# Patient Record
Sex: FEMALE | Race: WHITE | NOT HISPANIC OR LATINO | Employment: PART TIME
[De-identification: names, ages, dates, MRNs, and addresses within clinical notes are randomized per-mention and may not be internally consistent; named-entity substitution may affect disease eponyms.]

---

## 2017-01-03 ENCOUNTER — SURGERY (OUTPATIENT)
Age: 52
End: 2017-01-03

## 2017-01-03 ENCOUNTER — ANESTHESIA (OUTPATIENT)
Dept: SURGERY | Facility: HOSPITAL | Age: 52
DRG: 657 | End: 2017-01-03
Payer: COMMERCIAL

## 2017-01-03 PROBLEM — C64.9 CLEAR CELL RENAL CELL CARCINOMA: Status: ACTIVE | Noted: 2017-01-03

## 2017-01-03 PROCEDURE — 63600175 PHARM REV CODE 636 W HCPCS: Performed by: NURSE ANESTHETIST, CERTIFIED REGISTERED

## 2017-01-03 PROCEDURE — 25000003 PHARM REV CODE 250: Performed by: NURSE ANESTHETIST, CERTIFIED REGISTERED

## 2017-01-03 PROCEDURE — D9220A PRA ANESTHESIA: Mod: ANES,,, | Performed by: ANESTHESIOLOGY

## 2017-01-03 PROCEDURE — D9220A PRA ANESTHESIA: Mod: CRNA,,, | Performed by: NURSE ANESTHETIST, CERTIFIED REGISTERED

## 2017-01-03 RX ORDER — PROPOFOL 10 MG/ML
VIAL (ML) INTRAVENOUS
Status: DISCONTINUED | OUTPATIENT
Start: 2017-01-03 | End: 2017-01-03

## 2017-01-03 RX ORDER — MIDAZOLAM HYDROCHLORIDE 1 MG/ML
INJECTION, SOLUTION INTRAMUSCULAR; INTRAVENOUS
Status: DISCONTINUED | OUTPATIENT
Start: 2017-01-03 | End: 2017-01-03

## 2017-01-03 RX ORDER — DEXAMETHASONE SODIUM PHOSPHATE 4 MG/ML
INJECTION, SOLUTION INTRA-ARTICULAR; INTRALESIONAL; INTRAMUSCULAR; INTRAVENOUS; SOFT TISSUE
Status: DISCONTINUED | OUTPATIENT
Start: 2017-01-03 | End: 2017-01-03

## 2017-01-03 RX ORDER — LIDOCAINE HYDROCHLORIDE ANHYDROUS AND DEXTROSE MONOHYDRATE .8; 5 G/100ML; G/100ML
INJECTION, SOLUTION INTRAVENOUS CONTINUOUS PRN
Status: DISCONTINUED | OUTPATIENT
Start: 2017-01-03 | End: 2017-01-03

## 2017-01-03 RX ORDER — NEOSTIGMINE METHYLSULFATE 1 MG/ML
INJECTION, SOLUTION INTRAVENOUS
Status: DISCONTINUED | OUTPATIENT
Start: 2017-01-03 | End: 2017-01-03

## 2017-01-03 RX ORDER — HYDROMORPHONE HYDROCHLORIDE 2 MG/ML
INJECTION, SOLUTION INTRAMUSCULAR; INTRAVENOUS; SUBCUTANEOUS
Status: DISCONTINUED | OUTPATIENT
Start: 2017-01-03 | End: 2017-01-03

## 2017-01-03 RX ORDER — ROCURONIUM BROMIDE 10 MG/ML
INJECTION, SOLUTION INTRAVENOUS
Status: DISCONTINUED | OUTPATIENT
Start: 2017-01-03 | End: 2017-01-03

## 2017-01-03 RX ORDER — KETAMINE HCL IN 0.9 % NACL 50 MG/5 ML
SYRINGE (ML) INTRAVENOUS
Status: DISCONTINUED | OUTPATIENT
Start: 2017-01-03 | End: 2017-01-03

## 2017-01-03 RX ORDER — LIDOCAINE HCL/PF 100 MG/5ML
SYRINGE (ML) INTRAVENOUS
Status: DISCONTINUED | OUTPATIENT
Start: 2017-01-03 | End: 2017-01-03

## 2017-01-03 RX ORDER — SODIUM CHLORIDE 9 MG/ML
INJECTION, SOLUTION INTRAVENOUS CONTINUOUS PRN
Status: DISCONTINUED | OUTPATIENT
Start: 2017-01-03 | End: 2017-01-03

## 2017-01-03 RX ORDER — GLYCOPYRROLATE 0.2 MG/ML
INJECTION INTRAMUSCULAR; INTRAVENOUS
Status: DISCONTINUED | OUTPATIENT
Start: 2017-01-03 | End: 2017-01-03

## 2017-01-03 RX ADMIN — ROCURONIUM BROMIDE 30 MG: 10 INJECTION, SOLUTION INTRAVENOUS at 01:01

## 2017-01-03 RX ADMIN — NEOSTIGMINE METHYLSULFATE 4 MG: 1 INJECTION INTRAVENOUS at 04:01

## 2017-01-03 RX ADMIN — EPHEDRINE SULFATE 10 MG: 50 INJECTION, SOLUTION INTRAMUSCULAR; INTRAVENOUS; SUBCUTANEOUS at 03:01

## 2017-01-03 RX ADMIN — SODIUM CHLORIDE, SODIUM GLUCONATE, SODIUM ACETATE, POTASSIUM CHLORIDE, MAGNESIUM CHLORIDE, SODIUM PHOSPHATE, DIBASIC, AND POTASSIUM PHOSPHATE: .53; .5; .37; .037; .03; .012; .00082 INJECTION, SOLUTION INTRAVENOUS at 01:01

## 2017-01-03 RX ADMIN — DEXAMETHASONE SODIUM PHOSPHATE 8 MG: 4 INJECTION, SOLUTION INTRAMUSCULAR; INTRAVENOUS at 02:01

## 2017-01-03 RX ADMIN — DEXTROSE 2 G: 50 INJECTION, SOLUTION INTRAVENOUS at 02:01

## 2017-01-03 RX ADMIN — HYDROMORPHONE HYDROCHLORIDE 0.2 MG: 2 INJECTION INTRAMUSCULAR; INTRAVENOUS; SUBCUTANEOUS at 03:01

## 2017-01-03 RX ADMIN — LIDOCAINE HYDROCHLORIDE 0.03 MG/KG/MIN: 8 INJECTION, SOLUTION INTRAVENOUS at 01:01

## 2017-01-03 RX ADMIN — SODIUM CHLORIDE: 0.9 INJECTION, SOLUTION INTRAVENOUS at 03:01

## 2017-01-03 RX ADMIN — GLYCOPYRROLATE 0.6 MG: 0.2 INJECTION, SOLUTION INTRAMUSCULAR; INTRAVENOUS at 04:01

## 2017-01-03 RX ADMIN — Medication 10 MG: at 01:01

## 2017-01-03 RX ADMIN — ROCURONIUM BROMIDE 10 MG: 10 INJECTION, SOLUTION INTRAVENOUS at 02:01

## 2017-01-03 RX ADMIN — LIDOCAINE HYDROCHLORIDE 80 MG: 20 INJECTION, SOLUTION INTRAVENOUS at 01:01

## 2017-01-03 RX ADMIN — BUPIVACAINE HYDROCHLORIDE 30 ML: 2.5 INJECTION, SOLUTION EPIDURAL; INFILTRATION; INTRACAUDAL; PERINEURAL at 02:01

## 2017-01-03 RX ADMIN — MIDAZOLAM HYDROCHLORIDE 2 MG: 1 INJECTION, SOLUTION INTRAMUSCULAR; INTRAVENOUS at 01:01

## 2017-01-03 RX ADMIN — DEXMEDETOMIDINE HYDROCHLORIDE 0.5 MCG/KG/HR: 100 INJECTION, SOLUTION, CONCENTRATE INTRAVENOUS at 01:01

## 2017-01-03 RX ADMIN — HYDROMORPHONE HYDROCHLORIDE 0.2 MG: 2 INJECTION INTRAMUSCULAR; INTRAVENOUS; SUBCUTANEOUS at 04:01

## 2017-01-03 RX ADMIN — PROPOFOL 120 MG: 10 INJECTION, EMULSION INTRAVENOUS at 01:01

## 2017-01-16 ENCOUNTER — OFFICE VISIT (OUTPATIENT)
Dept: HEMATOLOGY/ONCOLOGY | Facility: CLINIC | Age: 52
End: 2017-01-16
Payer: COMMERCIAL

## 2017-01-16 ENCOUNTER — LAB VISIT (OUTPATIENT)
Dept: LAB | Facility: HOSPITAL | Age: 52
End: 2017-01-16
Payer: COMMERCIAL

## 2017-01-16 VITALS
HEIGHT: 64 IN | WEIGHT: 107.38 LBS | HEART RATE: 75 BPM | TEMPERATURE: 98 F | SYSTOLIC BLOOD PRESSURE: 143 MMHG | RESPIRATION RATE: 14 BRPM | DIASTOLIC BLOOD PRESSURE: 82 MMHG | BODY MASS INDEX: 18.33 KG/M2

## 2017-01-16 DIAGNOSIS — Z90.5 S/P NEPHRECTOMY: ICD-10-CM

## 2017-01-16 DIAGNOSIS — C78.00 MALIGNANT NEOPLASM METASTATIC TO LUNG, UNSPECIFIED LATERALITY: ICD-10-CM

## 2017-01-16 DIAGNOSIS — D50.9 MICROCYTIC ANEMIA: ICD-10-CM

## 2017-01-16 DIAGNOSIS — Z51.11 ENCOUNTER FOR CHEMOTHERAPY MANAGEMENT: ICD-10-CM

## 2017-01-16 DIAGNOSIS — R59.0 RETROPERITONEAL LYMPHADENOPATHY: ICD-10-CM

## 2017-01-16 DIAGNOSIS — C79.51 BONE METASTASES: ICD-10-CM

## 2017-01-16 DIAGNOSIS — C64.2 METASTATIC RENAL CELL CARCINOMA, LEFT: Primary | ICD-10-CM

## 2017-01-16 DIAGNOSIS — N28.89 RENAL MASS: ICD-10-CM

## 2017-01-16 DIAGNOSIS — C64.2 METASTATIC RENAL CELL CARCINOMA, LEFT: ICD-10-CM

## 2017-01-16 DIAGNOSIS — D63.0 ANEMIA IN NEOPLASTIC DISEASE: ICD-10-CM

## 2017-01-16 LAB
ALBUMIN SERPL BCP-MCNC: 3.2 G/DL
ALP SERPL-CCNC: 64 U/L
ALT SERPL W/O P-5'-P-CCNC: 20 U/L
ANION GAP SERPL CALC-SCNC: 6 MMOL/L
AST SERPL-CCNC: 22 U/L
BILIRUB SERPL-MCNC: 0.2 MG/DL
BUN SERPL-MCNC: 12 MG/DL
CALCIUM SERPL-MCNC: 9 MG/DL
CHLORIDE SERPL-SCNC: 103 MMOL/L
CO2 SERPL-SCNC: 31 MMOL/L
CREAT SERPL-MCNC: 0.9 MG/DL
ERYTHROCYTE [DISTWIDTH] IN BLOOD BY AUTOMATED COUNT: 19.6 %
EST. GFR  (AFRICAN AMERICAN): >60 ML/MIN/1.73 M^2
EST. GFR  (NON AFRICAN AMERICAN): >60 ML/MIN/1.73 M^2
GLUCOSE SERPL-MCNC: 84 MG/DL
HCT VFR BLD AUTO: 27.6 %
HGB BLD-MCNC: 8.2 G/DL
MCH RBC QN AUTO: 22.7 PG
MCHC RBC AUTO-ENTMCNC: 29.7 %
MCV RBC AUTO: 77 FL
NEUTROPHILS # BLD AUTO: 4.6 K/UL
PLATELET # BLD AUTO: 432 K/UL
PMV BLD AUTO: 9.4 FL
POTASSIUM SERPL-SCNC: 4.6 MMOL/L
PROT SERPL-MCNC: 7.2 G/DL
RBC # BLD AUTO: 3.61 M/UL
SODIUM SERPL-SCNC: 140 MMOL/L
WBC # BLD AUTO: 6.71 K/UL

## 2017-01-16 PROCEDURE — 80053 COMPREHEN METABOLIC PANEL: CPT

## 2017-01-16 PROCEDURE — 85027 COMPLETE CBC AUTOMATED: CPT

## 2017-01-16 PROCEDURE — 1159F MED LIST DOCD IN RCRD: CPT | Mod: S$GLB,,, | Performed by: INTERNAL MEDICINE

## 2017-01-16 PROCEDURE — 99999 PR PBB SHADOW E&M-EST. PATIENT-LVL IV: CPT | Mod: PBBFAC,,, | Performed by: INTERNAL MEDICINE

## 2017-01-16 PROCEDURE — 99215 OFFICE O/P EST HI 40 MIN: CPT | Mod: S$GLB,,, | Performed by: INTERNAL MEDICINE

## 2017-01-16 PROCEDURE — 36415 COLL VENOUS BLD VENIPUNCTURE: CPT

## 2017-01-16 NOTE — PROGRESS NOTES
"Subjective:       Patient ID: Nikia Lamas is a 51 y.o. female.    Chief Complaint: biopsy results (left nephrectomy 1/3/17)    HPI     Nikia Lamas is a 51 y.o. White female who presents today for follow up after undergoing left nephrectomy with Dr. German on 1/3/17. 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 Pt states she is healing well, has not had to take narcotic pain medication since Saturday. She is taking naprosyn PRN.     She denies ever smoking. She has an aunt who was recently diagnosed with breast cancer but denies a personal and family history of kidney cancer.    She denies any mouth sores, nausea, vomiting, diarrhea, constipation, abdominal pain, weight loss or loss of appetite, chest pain, shortness of breath, leg swelling, fatigue, pain, headache, dizziness, or mood changes.    Her ECOG PS is zero and her quality of life so far is excellent. She is accompanied by her parents and daughter.     Oncologic History (From Chart and Patient):  Nikia Lamas is a 51 y.o. White female, referred by Dr. German, for management of metastatic renal cell carcinoma. Pt reports left mass was found incidentally while being worked up for a persistent dry cough that began in October 2016. She was evaluated with a CXR and a non-contrast chest CT.   12/14/16- CT of the chest without contrast showed multiple pulmonary nodes and a large left renal mass present. The L kidney was not fully evaluated as only the superior aspect was in the CT, but the mass was ~ 11 cm in diameter.   12/19/16- CT CAP reveals "Large heterogeneous left renal mass, consistent with renal cell carcinoma.  This mass does not appear to invade the renal vein or IVC.  Innumerable pulmonary nodules, retroperitoneal lymphadenopathy and osseous lytic lesions, concerning for metastatic disease. 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   HENT: Negative for mouth sores, sore throat and trouble swallowing.    Eyes: Negative for photophobia and visual disturbance.   Respiratory: Negative for cough, chest tightness and shortness of breath.    Cardiovascular: Negative for chest pain and leg swelling.   Gastrointestinal: Negative for abdominal distention, abdominal pain, constipation, diarrhea, nausea and vomiting.   Genitourinary: Negative for dysuria, frequency and hematuria.   Musculoskeletal: Negative for back pain.   Skin: Negative for rash.   Neurological: Negative for dizziness, weakness and headaches.   Hematological: Negative for adenopathy. Does not bruise/bleed easily.   Psychiatric/Behavioral: Negative for sleep disturbance. The patient is not nervous/anxious.    All other systems reviewed and are negative.      Allergies:  Review of patient's allergies indicates:   Allergen Reactions    No known drug allergies        Medications:  Current Outpatient Prescriptions   Medication Sig Dispense Refill    naproxen sodium (ANAPROX DS) 550 MG tablet Take 1 tablet (550 mg total) by mouth 2 (two) times daily with meals. 60 tablet 6    polyethylene glycol (GLYCOLAX) 17 gram PwPk  Take 17 g by mouth once daily. 30 packet 0    multivitamin (ONE DAILY MULTIVITAMIN) per tablet Take 1 tablet by mouth once daily.      ondansetron (ZOFRAN-ODT) 8 MG TbDL Take 1 tablet (8 mg total) by mouth every 6 (six) hours as needed (nausea/vomiting). 20 tablet 0    oxycodone-acetaminophen (PERCOCET) 5-325 mg per tablet Take 1 tablet by mouth every 4 (four) hours as needed for Pain. 41 tablet 0    valacyclovir (VALTREX) 500 MG tablet TAKE 1 TABLET (500 MG TOTAL) BY MOUTH ONCE DAILY. 30 tablet 12     No current facility-administered medications for this visit.        PMH:  Past Medical History   Diagnosis Date    Abnormal Pap smear of cervix 10/2015     + HR HPV    Anemia      hx chronic anemia, improved after ablation    Herpes simplex without mention of complication        PSH:  Past Surgical History   Procedure Laterality Date    Laparoscopy for endometriosis      Dilation and curettage of uterus       section  ,     Tubal ligation       Laparoscopic    Endometrial ablation  2014     Novasure endometrial ablation with D&C       FamHx:  Family History   Problem Relation Age of Onset    Alzheimer's disease Maternal Grandmother     Hypertension Mother     Breast cancer Neg Hx     Colon cancer Neg Hx     Ovarian cancer Neg Hx     Diabetes Neg Hx     Stroke Neg Hx        SocHx:  Social History     Social History    Marital status: Single     Spouse name: N/A    Number of children: N/A    Years of education: N/A     Occupational History    Not on file.     Social History Main Topics    Smoking status: Never Smoker    Smokeless tobacco: Never Used    Alcohol use 0.6 oz/week     1 Glasses of wine per week      Comment: social    Drug use: No    Sexual activity: Not Currently     Partners: Male     Birth control/ protection: Surgical, None      Comment: No partner x 1 year     Other Topics Concern    Not on file     Social History Narrative       Objective:       Physical Exam   Constitutional: She is oriented to person, place, and time. She appears well-developed and well-nourished. No distress.   HENT:   Head: Normocephalic and atraumatic.   Eyes: Pupils are equal, round, and reactive to light.   Neck: Normal range of motion. Neck supple.   Pulmonary/Chest: Effort normal.   Musculoskeletal: Normal range of motion.   Neurological: She is alert and oriented to person, place, and time.   Skin: Skin is warm and dry. She is not diaphoretic.   Psychiatric: She has a normal mood and affect. Her behavior is normal. Judgment and thought content normal.       LABS:  WBC   Date Value Ref Range Status   01/16/2017 6.71 3.90 - 12.70 K/uL Final     Hemoglobin   Date Value Ref Range Status   01/16/2017 8.2 (L) 12.0 - 16.0 g/dL Final     Hematocrit   Date Value Ref Range Status   01/16/2017 27.6 (L) 37.0 - 48.5 % Final     Platelets   Date Value Ref Range Status   01/16/2017 432 (H) 150 - 350 K/uL Final       Chemistry        Component Value Date/Time     01/16/2017 1404    K 4.6 01/16/2017 1404     01/16/2017 1404    CO2 31 (H) 01/16/2017 1404    BUN 12 01/16/2017 1404    CREATININE 0.9 01/16/2017 1404    GLU 84 01/16/2017 1404        Component Value Date/Time    CALCIUM 9.0 01/16/2017 1404    ALKPHOS 64 01/16/2017 1404    AST 22 01/16/2017 1404    ALT 20 01/16/2017 1404    BILITOT 0.2 01/16/2017 1404          Assessment:       1. Metastatic renal cell carcinoma, left    2. Bone metastases    3. Retroperitoneal lymphadenopathy    4. Malignant neoplasm metastatic to lung, unspecified laterality    5. S/p nephrectomy    6. Encounter for chemotherapy management    7. Anemia in neoplastic disease    8. Microcytic anemia        Plan:       1,2,3,4,5,6-Metastatic renal cell carcinoma    Staging form: Kidney, AJCC 7th Edition      Pathologic stage from 1/3/2017: Stage IV (T3a, N1, M1) - Unsigned      Nikia Lamas is a 51 y.o. White female to clinic to finalize treatment for  metastatic renal cell carcinoma. A thorough discussion was previously had regarding metastatic renal cell carcinoma and that is not a curable disease but it is a highly treatable disease.     Pt now s/p cytoreductive open left radical nephrectomy with Dr. German. 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 Pt continues with alkaline and low sugar diet to help with her cancer despite previously explaining that there is little scientific evidence to suggest any oncologic benefit, however this would likely cause [id number] harm. Additionally, she is taking taheebo tea.  She strongly wishes to continue this for now. KEYNOTE clinical trial is not enrolling participants at this time. Will start cabozantinib.     Patient was consented for cabozantinib today 1/16/2017 .   An extensive discussion was had which included a thorough discussion of the risk and benefits of treatment and alternatives.  Risks, including but not limited to, possible hair loss/color change, hypertension, bone marrow damage (anemia, thrombocytopenia, immune suppression, neutropenia), damage to body organs (brain, heart, liver, kidney, lungs, nervous system, skin, and others), allergic reactions, sterility, nausea/vomiting, constipation/diarrhea, sores in the mouth, secondary cancers, local damage at possible injection sites, and rarely death were all discussed.  The patient agrees with the plan, and all questions have been answered to their satisfaction.  Consent was signed the patient, provider, and a third party witness.      Will have pt RTC 3 weeks after starting cabozantinib. Will plan to repeat CT scans in 3 months from starting medication. Alternative therapies include immunotherapy.     6,7- Improving. Will get iron studies with next labs.    Patient was also seen and examined by Dr. Connelly. Patient is in agreement with the proposed treatment plan. All questions were answered to the patient's  satisfaction. Pt knows to call clinic if anything is needed before the next clinic visit.    JASBIR Yadav  Hematology and Medical Oncology        I have reviewed the notes, assessments, and/or procedures performed by the housestaff, as above.  I have personally interviewed and examined the patient at the beside, and rounded with the housestaff. I concur with her/his assessment and plan and the documentation of Nikia Lamas.  More than 40 mins were spent during this encounter, greater than 50% was spent in direct counseling and/or coordination of care.     Jerson Connelly M.D., M.S.  Hematology/Oncology Attending  [facility]

## 2017-01-16 NOTE — Clinical Note
RTC in 3 weeks after starting cabozantinib with labs (CBC,CMP,ferritin,iron and tibc) and to see me.

## 2017-01-18 ENCOUNTER — TELEPHONE (OUTPATIENT)
Dept: UROLOGY | Facility: CLINIC | Age: 52
End: 2017-01-18

## 2017-01-18 ENCOUNTER — PATIENT MESSAGE (OUTPATIENT)
Dept: UROLOGY | Facility: CLINIC | Age: 52
End: 2017-01-18

## 2017-01-18 ENCOUNTER — TELEPHONE (OUTPATIENT)
Dept: PHARMACY | Facility: CLINIC | Age: 52
End: 2017-01-18

## 2017-01-18 NOTE — TELEPHONE ENCOUNTER
patient called back in response to voicemail and setup shipment. She verifed she has not started any new medication. She has not developed any new drug allergies or medical conditions. She schedule her ship date of 1-19-17 for a 1-20-17 delivery. She verified her address on file is correct and would like her medication to go there. She is new to pharmacy and new to drug and has declined consultation with a clinical pharmacist. She verified understanding of the refill process and has no additional questions at the time_ 1-18-17.    Documention only    JAYSON approved from today 1-18-17 to 1/18/18   copay 150$     RxBIN: [id number]  RxPCN: [id number]  RxGRP: [id number]  Issuer: [id number]  ID: [id number]   copay with card 10 dollars

## 2017-01-18 NOTE — TELEPHONE ENCOUNTER
Called pt and told her that Dr German said that she could return to work when she felt up to it since dr escobedo told her he was fine with it. All pt's questions answered. Pt verbalized understanding to all.

## 2017-01-18 NOTE — TELEPHONE ENCOUNTER
----- Message from Amanda Rothman sent at 1/18/2017  9:48 AM CST -----  Contact: pt [phone]  Patient states she has questions regarding post op instructions. Patient also states she had a follow up visit with Dr Connelly and thought that Dr German would also be at that visi and would like to know should she still follow up with Dr German.

## 2017-01-23 ENCOUNTER — TELEPHONE (OUTPATIENT)
Dept: HEMATOLOGY/ONCOLOGY | Facility: CLINIC | Age: 52
End: 2017-01-23

## 2017-01-23 ENCOUNTER — TELEPHONE (OUTPATIENT)
Dept: PHARMACY | Facility: CLINIC | Age: 52
End: 2017-01-23

## 2017-01-23 ENCOUNTER — PATIENT MESSAGE (OUTPATIENT)
Dept: HEMATOLOGY/ONCOLOGY | Facility: CLINIC | Age: 52
End: 2017-01-23

## 2017-01-23 ENCOUNTER — TELEPHONE (OUTPATIENT)
Dept: ADMINISTRATIVE | Facility: OTHER | Age: 52
End: 2017-01-23

## 2017-01-23 NOTE — TELEPHONE ENCOUNTER
----- Message from Brina Fernandez sent at 1/23/2017  1:07 PM CST -----  Contact: Pt   Pt would like the nurse to give her a call back in regards to some question about her  her chemo pills .. Contact [facility] [phone]..

## 2017-01-23 NOTE — TELEPHONE ENCOUNTER
Spoke with patient.   reviewed Opal Ibarra NP my chart message, see below   Informed her we can arrange for her to see our Dietician here who specializes in oncology patients. While taking oral chemotherapy, she can be around pregnant women; however, she should not handle the medication nor be exposed to your bodily fluids or waste. Usually once starting the medication, we get repeat scans in 2-3 months. Per Dr Connelly as a general rule, we do not repeat scans while patients are not on active therapy.   She is are okay taking the oral Mucinex but I would just check with the pharmacist who has been assisting you from [facility].  Patient replied she had and they informed her it was ok to take.  Questions answered to her satisfaction.  Message routed to Marjan to schedule an appt for our dietician.

## 2017-01-23 NOTE — TELEPHONE ENCOUNTER
----- Message from Opal Ibarra NP sent at 1/23/2017  2:53 PM CST -----  Can you schedule with nutrition ASAP? Thanks!

## 2017-01-23 NOTE — TELEPHONE ENCOUNTER
"email received from patient   "I'm sorry if you keep getting this, but my computer keeps telling me it didn't send.    Good Morning,  Friday I received my medication (Cabometyx/Cabozantinib)  Now I have questions:    1) I do not have a healthy weight.  I woke up this morning at 99.6.  I do not have any more weight to loose.  So I'm concerned about taking the meds with the side effects listed.    2) Can I be around my 17 wk pregnant daughter and what precautions do I need to take?    3) I'm going to be taking an assessment for a nutritional work up,  I will forward that result to you to make sure they will agree with the chemo pill if I choose to start taking them.    4) Saturday I took a Mucinex DM for a sinus issue I was having.  Only took the one and I am ok today.  Will I be able to start taking the Chemo pill on tomorrow, Tuesday 01/24 if I decide to start taking the pill?    5) If I get nauseated from the chemo pill, can I take Ondansetron ODT 8mg? I was just prescribed this med when I was getting out of the hospital but never had to take any.    6) This is a list of what I am taking, please make sure it is safe with the chemo pill:  Alive 50+ (vitamin)         Chromium Picolinate         1tsp baking soda 2 x's a day or less         Taheebo Tea ( Pure Jerilyn D' [place])         Valacyclovir HCL ( as needed)         Naproxen ( as needed)    7) Also would like to know if I can take Tylenol if needed?    I'm sorry if this is a lot.  I'm scared of the Chemo pills and just want to make sure I make the right decision.  Patient is advised tylenol is ok; zofran is fine; can continue all medications and supplements listed here (chromium is found in most MVI); she is advised to discontinue herbal tea as we have no data if tea will slow or speed metabolism of the medication. She is unsure if she wishes to start. Mucinex is fine. Recommended the nutrition consult. Patient states she has been eating very little because many foods " turn to sugars and sugars will feed ehr cancer. I advised this is incorrect and she should follow a healthy balanced diet. Advised clean/healthy hygiene regarding being around her daughter.    Patient was teary when the call disconnected.    Of note: Patient was offered pharmacist consultation w\ Clinical PharmD 1/18 which she declined.    Abilio Aranda, PharmD, [facility]  [facility]  [phone]

## 2017-01-23 NOTE — TELEPHONE ENCOUNTER
"Incoming email to specialty pharmacy from Ms Lamas 17 16:34    "I have also put turmeric and teddy root in a soup that I made.  Is that the kind of stuff I need to run by you or I am safe with spices like that?     1965    --   Nikia Lamas  Realtor  [phone]"      PharmD response 17 16:45  "Ms Lamas,    As long as you are just using items like this to spice and flavor foods, this is ok. Those amounts are negligible. We do not recommend additional over the counter supplementation with these herbal extracts."    Abilio Aranda, PharmD, BCPS [facility]  [phone]    "

## 2017-01-24 ENCOUNTER — TELEPHONE (OUTPATIENT)
Dept: HEMATOLOGY/ONCOLOGY | Facility: CLINIC | Age: 52
End: 2017-01-24

## 2017-01-24 NOTE — TELEPHONE ENCOUNTER
----- Message from Chandrika Allison sent at 1/24/2017 11:19 AM CST -----  Contact: Preethi with [facility]   Preethi ramesh [facility] is calling to get a dental clearance for a teeth cleaning.  Contact number [phone]

## 2017-01-26 ENCOUNTER — TELEPHONE (OUTPATIENT)
Dept: ADMINISTRATIVE | Facility: OTHER | Age: 52
End: 2017-01-26

## 2017-01-26 NOTE — TELEPHONE ENCOUNTER
----- Message from Opal Ibarra NP sent at 1/16/2017  7:53 PM CST -----  RTC in 3 weeks after starting cabozantinib with labs (CBC,CMP,ferritin,iron and tibc) and to see me.

## 2017-01-30 ENCOUNTER — CLINICAL SUPPORT (OUTPATIENT)
Dept: HEMATOLOGY/ONCOLOGY | Facility: CLINIC | Age: 52
End: 2017-01-30
Payer: COMMERCIAL

## 2017-01-30 VITALS — HEIGHT: 64 IN | WEIGHT: 103.63 LBS | BODY MASS INDEX: 17.69 KG/M2

## 2017-01-30 DIAGNOSIS — C64.9 METASTATIC RENAL CELL CARCINOMA, UNSPECIFIED LATERALITY: Primary | ICD-10-CM

## 2017-01-30 DIAGNOSIS — R63.4 UNINTENTIONAL WEIGHT LOSS: ICD-10-CM

## 2017-01-30 DIAGNOSIS — Z71.3 NUTRITIONAL COUNSELING: ICD-10-CM

## 2017-01-30 PROCEDURE — 97802 MEDICAL NUTRITION INDIV IN: CPT | Mod: S$GLB,,, | Performed by: DIETITIAN, REGISTERED

## 2017-01-30 PROCEDURE — 99999 PR PBB SHADOW E&M-EST. PATIENT-LVL III: CPT | Mod: PBBFAC,,,

## 2017-01-30 NOTE — PROGRESS NOTES
"Referring Physician:Opal Ibarra NP     Reason for visit:  Chief Complaint   Patient presents with    metastatic renal cell carcinoma    Nutrition Counseling    Weight Loss        :1965     Allergies Reviewed  Meds Reviewed    Anthropometrics  Weight:47 kg (103 lb 9.9 oz)  Height:5' 4" (1.626 m)  BMI:Body mass index is 17.79 kg/(m^2).   IBW: 120lbs    Meds:  Outpatient Medications Prior to Visit   Medication Sig Dispense Refill    cabozantinib 60 mg Tab Take 60 mg by mouth once daily. 30 tablet 3    multivitamin (ONE DAILY MULTIVITAMIN) per tablet Take 1 tablet by mouth once daily.      naproxen sodium (ANAPROX DS) 550 MG tablet Take 1 tablet (550 mg total) by mouth 2 (two) times daily with meals. 60 tablet 6    ondansetron (ZOFRAN-ODT) 8 MG TbDL Take 1 tablet (8 mg total) by mouth every 6 (six) hours as needed (nausea/vomiting). 20 tablet 0    oxycodone-acetaminophen (PERCOCET) 5-325 mg per tablet Take 1 tablet by mouth every 4 (four) hours as needed for Pain. 41 tablet 0    polyethylene glycol (GLYCOLAX) 17 gram PwPk Take 17 g by mouth once daily. 30 packet 0    valacyclovir (VALTREX) 500 MG tablet TAKE 1 TABLET (500 MG TOTAL) BY MOUTH ONCE DAILY. 30 tablet 12     No facility-administered medications prior to visit.          Labs:      Estimated Nutrition Needs: 1410Kcals/day( 30kcal/kg),  60g protein( 1.3g/kg)     Diet Hx: Pt comes in with unintentional weight loss. Pt has been keeping a log of weight for the past week; weight has been slowly increasing. Pt has a great appetite and eats frequently throughout the day, as noted in food journal. Pt has been avoiding "sugars" because of her belief that sugar fuels cancer. Pt is eating fruits and vegetables, yogurt, whole grain toast, beans, nuts and lean protein. Pt resistant to Ensure/Boost due to sugar content.     Breakfast: Smoothie with coconut milk, kale, fruit  Lunch: ham sandwich on two slices of whole grain toast with a side of " fruit  Dinner: blackened chicken with vegetables    Assessment: Discussed the importance of eating a variety of foods in order to consume all needed vitamins and minerals. Explained that limiting added sugars is recommended, but limiting total carbohydrates is not. Discussed choosing whole grains like brown rice and pasta for additional fiber. Discussed strategies for adding calories and protein to current diet plan. Encouraged pt to consume at least 1-2 oral supplements/day.     Nutrition Diagnosis: food and nutrition related knowledge deficit related to lack of prior exposure to accurate nutrition-related information as evidenced by pt verbalizing inaccurate or incomplete information regarding diet for oncology patients (ie. Sugar fuels cancer)    Recommendations:   1. High calorie/high protein diet  2. Consume 6 small meals/day.   3. Consume 1-2 oral supplements/day       Consultation Time:45 minutes.    Follow Up: F/u with dietitian as needed.

## 2017-01-30 NOTE — PATIENT INSTRUCTIONS
1. High calorie/high protein diet  2. Consume 6 small meals/day.   3. Consume 1-2 oral supplements/day

## 2017-02-01 ENCOUNTER — TELEPHONE (OUTPATIENT)
Dept: HEMATOLOGY/ONCOLOGY | Facility: CLINIC | Age: 52
End: 2017-02-01

## 2017-02-01 NOTE — TELEPHONE ENCOUNTER
Called patient to follow up with issues she is experiencing. Pt reports she is having some reproducible pain behind her left breast. Pain worse with movement. She took Naprosyn and had relief. Discussed with patient that it is most likely a pulled muscle. She can use Naprosyn and apply ice or heat to help.    Pt started cabozantinib on January 25. She reports had several episodes of emesis yesterday evening after eating a smoothie she made at home. States she took cabozantinib around 230PM. Pt did not take zofran. Encouraged to take zofran as soon as she feels queasy.     Understanding verbalized and thanked NP for calling.

## 2017-02-01 NOTE — TELEPHONE ENCOUNTER
----- Message from Chandrika Allison sent at 2/1/2017  8:14 AM CST -----  Contact: self  Pt states last night she had to vomit 3x, pt not sure what may be causing this symptom, pt had some peanut butter but doesn't think it from that, pt also has a pain behind the left breast.  Contact number [phone]

## 2017-02-01 NOTE — TELEPHONE ENCOUNTER
Pt states behind her left breast it feels like a muscle is hurting her, the bone is hurting more.  Pain is at 0 if she is not doing anything,  With movement is 5-6/10, states her mobility is impaired.  Hurts also when she coughs.    Took Naprosyn and it helped.      Patient with Nausea x 3 last night, no episodes today.    Informed her I would send a message to Opal Mcginnis our Nurse and have her call to discuss further.

## 2017-02-06 ENCOUNTER — LAB VISIT (OUTPATIENT)
Dept: LAB | Facility: HOSPITAL | Age: 52
End: 2017-02-06
Payer: COMMERCIAL

## 2017-02-06 ENCOUNTER — OFFICE VISIT (OUTPATIENT)
Dept: HEMATOLOGY/ONCOLOGY | Facility: CLINIC | Age: 52
End: 2017-02-06
Payer: COMMERCIAL

## 2017-02-06 VITALS
DIASTOLIC BLOOD PRESSURE: 97 MMHG | BODY MASS INDEX: 17.69 KG/M2 | HEART RATE: 86 BPM | SYSTOLIC BLOOD PRESSURE: 180 MMHG | WEIGHT: 103.63 LBS | HEIGHT: 64 IN | TEMPERATURE: 99 F

## 2017-02-06 DIAGNOSIS — D63.0 ANEMIA IN NEOPLASTIC DISEASE: ICD-10-CM

## 2017-02-06 DIAGNOSIS — R59.0 RETROPERITONEAL LYMPHADENOPATHY: ICD-10-CM

## 2017-02-06 DIAGNOSIS — Z51.11 ENCOUNTER FOR CHEMOTHERAPY MANAGEMENT: ICD-10-CM

## 2017-02-06 DIAGNOSIS — Z90.5 S/P NEPHRECTOMY: ICD-10-CM

## 2017-02-06 DIAGNOSIS — C64.2 METASTATIC RENAL CELL CARCINOMA, LEFT: ICD-10-CM

## 2017-02-06 DIAGNOSIS — C79.51 BONE METASTASES: ICD-10-CM

## 2017-02-06 DIAGNOSIS — M94.0 COSTOCHONDRITIS: ICD-10-CM

## 2017-02-06 DIAGNOSIS — D50.9 MICROCYTIC ANEMIA: ICD-10-CM

## 2017-02-06 DIAGNOSIS — L27.0 DRUG RASH: ICD-10-CM

## 2017-02-06 DIAGNOSIS — C64.2 METASTATIC RENAL CELL CARCINOMA, LEFT: Primary | ICD-10-CM

## 2017-02-06 DIAGNOSIS — C78.00 MALIGNANT NEOPLASM METASTATIC TO LUNG, UNSPECIFIED LATERALITY: ICD-10-CM

## 2017-02-06 DIAGNOSIS — I15.9 SECONDARY HYPERTENSION: ICD-10-CM

## 2017-02-06 DIAGNOSIS — K59.00 CONSTIPATION, UNSPECIFIED CONSTIPATION TYPE: ICD-10-CM

## 2017-02-06 LAB
ALBUMIN SERPL BCP-MCNC: 3.5 G/DL
ALP SERPL-CCNC: 107 U/L
ALT SERPL W/O P-5'-P-CCNC: 35 U/L
ANION GAP SERPL CALC-SCNC: 6 MMOL/L
AST SERPL-CCNC: 35 U/L
BILIRUB SERPL-MCNC: 0.2 MG/DL
BUN SERPL-MCNC: 19 MG/DL
CALCIUM SERPL-MCNC: 9.3 MG/DL
CHLORIDE SERPL-SCNC: 102 MMOL/L
CO2 SERPL-SCNC: 31 MMOL/L
CREAT SERPL-MCNC: 1 MG/DL
ERYTHROCYTE [DISTWIDTH] IN BLOOD BY AUTOMATED COUNT: 18.7 %
EST. GFR  (AFRICAN AMERICAN): >60 ML/MIN/1.73 M^2
EST. GFR  (NON AFRICAN AMERICAN): >60 ML/MIN/1.73 M^2
FERRITIN SERPL-MCNC: 209 NG/ML
GLUCOSE SERPL-MCNC: 80 MG/DL
HCT VFR BLD AUTO: 34.7 %
HGB BLD-MCNC: 10.9 G/DL
IRON SERPL-MCNC: 47 UG/DL
MCH RBC QN AUTO: 23.9 PG
MCHC RBC AUTO-ENTMCNC: 31.4 %
MCV RBC AUTO: 76 FL
NEUTROPHILS # BLD AUTO: 2.8 K/UL
PLATELET # BLD AUTO: 317 K/UL
PMV BLD AUTO: 10.8 FL
POTASSIUM SERPL-SCNC: 5.2 MMOL/L
PROT SERPL-MCNC: 7.4 G/DL
RBC # BLD AUTO: 4.57 M/UL
SATURATED IRON: 14 %
SODIUM SERPL-SCNC: 139 MMOL/L
TOTAL IRON BINDING CAPACITY: 332 UG/DL
TRANSFERRIN SERPL-MCNC: 224 MG/DL
WBC # BLD AUTO: 4.79 K/UL

## 2017-02-06 PROCEDURE — 99214 OFFICE O/P EST MOD 30 MIN: CPT | Mod: S$GLB,,, | Performed by: NURSE PRACTITIONER

## 2017-02-06 PROCEDURE — 80053 COMPREHEN METABOLIC PANEL: CPT

## 2017-02-06 PROCEDURE — 99999 PR PBB SHADOW E&M-EST. PATIENT-LVL III: CPT | Mod: PBBFAC,,, | Performed by: NURSE PRACTITIONER

## 2017-02-06 PROCEDURE — 82728 ASSAY OF FERRITIN: CPT

## 2017-02-06 PROCEDURE — 83540 ASSAY OF IRON: CPT

## 2017-02-06 PROCEDURE — 85027 COMPLETE CBC AUTOMATED: CPT

## 2017-02-06 PROCEDURE — 36415 COLL VENOUS BLD VENIPUNCTURE: CPT

## 2017-02-06 RX ORDER — AMLODIPINE BESYLATE 5 MG/1
5 TABLET ORAL DAILY
Qty: 30 TABLET | Refills: 2 | Status: SHIPPED | OUTPATIENT
Start: 2017-02-06 | End: 2017-02-23

## 2017-02-06 RX ORDER — DOXYCYCLINE HYCLATE 100 MG
100 TABLET ORAL 2 TIMES DAILY
Qty: 28 TABLET | Refills: 0 | Status: SHIPPED | OUTPATIENT
Start: 2017-02-06 | End: 2017-02-20

## 2017-02-06 RX ORDER — WEIGH SCALE
MISCELLANEOUS MISCELLANEOUS
Status: ON HOLD | COMMUNITY
Start: 2017-02-06 | End: 2017-04-17 | Stop reason: HOSPADM

## 2017-02-06 NOTE — PROGRESS NOTES
Subjective:       Patient ID: Nikia Lamas is a 51 y.o. female.    Chief Complaint: renal cell cancer; Results (labs); Rash (trunk/arms/started 2/3/17); and Constipation    Rash   Pertinent negatives include no cough, diarrhea, fatigue, fever, shortness of breath, sore throat or vomiting.   Constipation   Pertinent negatives include no abdominal pain, back pain, diarrhea, fever, nausea or vomiting.      Nikia Lamas is a 51 y.o. White female, pt of Dr. Connelly, to clinic for 3 week follow up after starting cabozantinib s/p left nephrectomy with Dr. German on 1/3/17. 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 Pt began with generalized body rash with mild pruitis on Friday.She notes some mild constipation and took a stool softener yesterday. Additionally, she has been having mid chest pain that is worse with movement or changes in position. She is taking naprosyn PRN with relief. No tenderness to palpation of chest.    She denies any mouth sores, nausea, vomiting, diarrhea, abdominal pain, weight loss or loss of appetite, chest pain, shortness of breath, leg swelling, fatigue,  headache, dizziness, or mood changes.    Her ECOG PS is zero and her quality of life so far is excellent. She is accompanied by her parents.She denies ever smoking. She denies a personal and family history of kidney cancer.    Oncologic History (From Chart and Patient):  Nikia Lamas is a 51 y.o. White female, referred by Dr. German, for management of metastatic renal cell carcinoma. Pt reports left mass was found incidentally while being worked up for a persistent dry cough that began in October 2016. She was evaluated with a CXR and a non-contrast chest CT.   12/14/16- CT of the chest without contrast showed multiple pulmonary nodes and a large left renal mass present. The L kidney was not fully evaluated as only the superior aspect was in the CT, but the mass was ~ 11 cm in diameter.  "  12/19/16- CT CAP reveals "Large heterogeneous left renal mass, consistent with renal cell carcinoma.  This mass does not appear to invade the renal vein or IVC.  Innumerable pulmonary nodules, retroperitoneal lymphadenopathy and osseous lytic lesions, concerning for metastatic disease. 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   HENT: Negative for mouth sores, sore throat and trouble swallowing.    Eyes: Negative for photophobia and visual disturbance.   Respiratory: Negative for cough, chest tightness and shortness of breath.    Cardiovascular: Negative for chest pain and leg swelling.   Gastrointestinal: Positive for constipation. Negative for abdominal distention, abdominal pain, diarrhea, nausea and vomiting.   Genitourinary: Negative for dysuria, frequency and hematuria.   Musculoskeletal: Negative for back pain.   Skin: Positive for rash.   Neurological: Negative for dizziness, weakness and headaches.   Hematological: Negative for adenopathy. Does not bruise/bleed easily.   Psychiatric/Behavioral: Negative for sleep disturbance. The patient is not nervous/anxious.    All other " systems reviewed and are negative.      Allergies:  Review of patient's allergies indicates:   Allergen Reactions    No known drug allergies        Medications:  Current Outpatient Prescriptions   Medication Sig Dispense Refill    cabozantinib 60 mg Tab Take 60 mg by mouth once daily. 30 tablet 3    multivitamin (ONE DAILY MULTIVITAMIN) per tablet Take 1 tablet by mouth once daily.      naproxen sodium (ANAPROX DS) 550 MG tablet Take 1 tablet (550 mg total) by mouth 2 (two) times daily with meals. 60 tablet 6    ondansetron (ZOFRAN-ODT) 8 MG TbDL Take 1 tablet (8 mg total) by mouth every 6 (six) hours as needed (nausea/vomiting). 20 tablet 0    oxycodone-acetaminophen (PERCOCET) 5-325 mg per tablet Take 1 tablet by mouth every 4 (four) hours as needed for Pain. 41 tablet 0    polyethylene glycol (GLYCOLAX) 17 gram PwPk Take 17 g by mouth once daily. 30 packet 0    valacyclovir (VALTREX) 500 MG tablet TAKE 1 TABLET (500 MG TOTAL) BY MOUTH ONCE DAILY. 30 tablet 12     No current facility-administered medications for this visit.        PMH:  Past Medical History   Diagnosis Date    Abnormal Pap smear of cervix 10/2015     + HR HPV    Anemia      hx chronic anemia, improved after ablation    Herpes simplex without mention of complication        PSH:  Past Surgical History   Procedure Laterality Date    Laparoscopy for endometriosis      Dilation and curettage of uterus       section  1992    Tubal ligation  2006     Laparoscopic    Endometrial ablation  2014     Novasure endometrial ablation with D&C       FamHx:  Family History   Problem Relation Age of Onset    Alzheimer's disease Maternal Grandmother     Hypertension Mother     Breast cancer Neg Hx     Colon cancer Neg Hx     Ovarian cancer Neg Hx     Diabetes Neg Hx     Stroke Neg Hx        SocHx:  Social History     Social History    Marital status: Single     Spouse name: N/A    Number of children: N/A    Years of  education: N/A     Occupational History    Not on file.     Social History Main Topics    Smoking status: Never Smoker    Smokeless tobacco: Never Used    Alcohol use 0.6 oz/week     1 Glasses of wine per week      Comment: social    Drug use: No    Sexual activity: Not Currently     Partners: Male     Birth control/ protection: Surgical, None      Comment: No partner x 1 year     Other Topics Concern    Not on file     Social History Narrative       Objective:      Physical Exam   Constitutional: She is oriented to person, place, and time. She appears well-developed and well-nourished. No distress.   HENT:   Head: Normocephalic and atraumatic.   Nose: Nose normal.   Mouth/Throat: Oropharynx is clear and moist. No oropharyngeal exudate.   Eyes: Conjunctivae and EOM are normal. Pupils are equal, round, and reactive to light. Right eye exhibits no discharge. Left eye exhibits no discharge.   Neck: Normal range of motion. Neck supple. No tracheal deviation present.   Cardiovascular: Normal rate, regular rhythm, normal heart sounds, intact distal pulses and normal pulses.    No swelling to bilateral lower extremities.    Pulmonary/Chest: Effort normal and breath sounds normal. She has no wheezes. She has no rales.   Abdominal: Soft. Bowel sounds are normal. She exhibits no distension. There is no tenderness. There is no guarding.   Musculoskeletal: Normal range of motion. She exhibits no edema or tenderness.   No spinal or paraspinal tenderness to palpation.       Lymphadenopathy:     She has no cervical adenopathy.   Neurological: She is alert and oriented to person, place, and time.   Skin: Skin is warm, dry and intact. Rash (generalized erythmatous flat rash) noted. She is not diaphoretic. No erythema. No pallor.   Psychiatric: She has a normal mood and affect. Her behavior is normal. Judgment and thought content normal.   Nursing note and vitals reviewed.      LABS:  WBC   Date Value Ref Range Status    02/06/2017 4.79 3.90 - 12.70 K/uL Final     Hemoglobin   Date Value Ref Range Status   02/06/2017 10.9 (L) 12.0 - 16.0 g/dL Final     Hematocrit   Date Value Ref Range Status   02/06/2017 34.7 (L) 37.0 - 48.5 % Final     Platelets   Date Value Ref Range Status   02/06/2017 317 150 - 350 K/uL Final       Chemistry        Component Value Date/Time     02/06/2017 1152    K 5.2 (H) 02/06/2017 1152     02/06/2017 1152    CO2 31 (H) 02/06/2017 1152    BUN 19 02/06/2017 1152    CREATININE 1.0 02/06/2017 1152    GLU 80 02/06/2017 1152        Component Value Date/Time    CALCIUM 9.3 02/06/2017 1152    ALKPHOS 107 02/06/2017 1152    AST 35 02/06/2017 1152    ALT 35 02/06/2017 1152    BILITOT 0.2 02/06/2017 1152          Assessment:       1. Metastatic renal cell carcinoma, left    2. Bone metastases    3. Retroperitoneal lymphadenopathy    4. Malignant neoplasm metastatic to lung, unspecified laterality    5. S/p nephrectomy    6. Encounter for chemotherapy management    7. Anemia in neoplastic disease    8. Secondary hypertension    9. Drug rash    10. Costochondritis    11. Constipation, unspecified constipation type        Plan:       1,2,3,4,5,6-Metastatic renal cell carcinoma    Staging form: Kidney, AJCC 7th Edition      Pathologic stage from 1/3/2017: Stage IV (T3a, N1, M1) - Unsigned      Nikia Lamas is a 51 y.o. White female to clinic to finalize treatment for metastatic renal cell carcinoma. A thorough discussion was previously had regarding metastatic renal cell carcinoma and that is not a curable disease but it is a highly treatable disease.     Pt now s/p cytoreductive open left radical nephrectomy with Dr. German. 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 Pt continues with alkaline and low sugar diet to help with her cancer despite previously explaining that there is little scientific evidence to suggest any oncologic benefit, however this would  likely cause [id number] harm. Additionally, she is taking taheebo tea.  She strongly wishes to continue this for now. KEYNOTE clinical trial is not enrolling participants at this time. Will start cabozantinib. Alternative therapies include immunotherapy.        RTC on 2/23/17 with labs (CBC,CMP) and to see me. Will plan to repeat CT scans in early April.     7- Improving, will monitor.    8- Will start norvasc 5mg daily. Pt encouraged to take blood pressure bid and keep a log. Discussed that it is a good sign that the medication is working.    9- Rarer but known side effect of cabozantinib. Start doxycycline 100mg bid for 2 weeks.    10- Believe that the pain she is feeling is due to costochondritis. Take naprosyn bid for 1 week to see if any improvement.    11 Take colace bid and use miralax every 3-4 days if needed.        More than 45 mins were spent during this encounter, greater than 50% was spent in direct counseling and/or coordination of care.       Patient was also seen and examined by Dr. Connelly. Patient is in agreement with the proposed treatment plan. All questions were answered to the patient's satisfaction. Pt knows to call clinic if anything is needed before the next clinic visit.    JASBIR Yadav  Hematology and Medical Oncology

## 2017-02-08 ENCOUNTER — TELEPHONE (OUTPATIENT)
Dept: PHARMACY | Facility: CLINIC | Age: 52
End: 2017-02-08

## 2017-02-08 NOTE — TELEPHONE ENCOUNTER
"Cabometyx follow-up: Confirmed 2 patient identifiers - name and . Patient reports no missed doses. Side effects reported: increased blood pressure - amlodipine, rash that started on stomach but spread throughout body, except face - prescribed doxycycline and directed to take benadryl PRN for itching, intermittent nausea at start of treatment but relieved with SL Zofran. MD was seen on Monday, 17, and aware of and all side effects have been addressed by MD. Appropriate administration, and storage confirmed. No changes to insurance, medical history, or medications. All questions answered and addressed to patients satisfaction. Patient understands to call [facility] should any questions arise. [facility] will also contact patient monthly to arrange refills. Declined review of QoL questions    Patient mentions starting "ID Life" a new supplement that is customized to her specific medications and medical history. She will email me more details to check for DDIs.   "

## 2017-02-09 ENCOUNTER — TELEPHONE (OUTPATIENT)
Dept: HEMATOLOGY/ONCOLOGY | Facility: CLINIC | Age: 52
End: 2017-02-09

## 2017-02-09 ENCOUNTER — PATIENT MESSAGE (OUTPATIENT)
Dept: HEMATOLOGY/ONCOLOGY | Facility: CLINIC | Age: 52
End: 2017-02-09

## 2017-02-09 DIAGNOSIS — G89.3 NEOPLASM RELATED PAIN (ACUTE) (CHRONIC): Primary | ICD-10-CM

## 2017-02-09 RX ORDER — CYCLOBENZAPRINE HCL 5 MG
5 TABLET ORAL 3 TIMES DAILY PRN
Qty: 30 TABLET | Refills: 0 | Status: SHIPPED | OUTPATIENT
Start: 2017-02-09 | End: 2017-02-19

## 2017-02-10 ENCOUNTER — PATIENT MESSAGE (OUTPATIENT)
Dept: HEMATOLOGY/ONCOLOGY | Facility: CLINIC | Age: 52
End: 2017-02-10

## 2017-02-13 ENCOUNTER — PATIENT MESSAGE (OUTPATIENT)
Dept: HEMATOLOGY/ONCOLOGY | Facility: CLINIC | Age: 52
End: 2017-02-13

## 2017-02-16 ENCOUNTER — PATIENT MESSAGE (OUTPATIENT)
Dept: HEMATOLOGY/ONCOLOGY | Facility: CLINIC | Age: 52
End: 2017-02-16

## 2017-02-20 ENCOUNTER — PATIENT MESSAGE (OUTPATIENT)
Dept: HEMATOLOGY/ONCOLOGY | Facility: CLINIC | Age: 52
End: 2017-02-20

## 2017-02-20 DIAGNOSIS — C78.00 MALIGNANT NEOPLASM METASTATIC TO LUNG, UNSPECIFIED LATERALITY: ICD-10-CM

## 2017-02-20 DIAGNOSIS — C64.9 METASTATIC RENAL CELL CARCINOMA, UNSPECIFIED LATERALITY: Primary | ICD-10-CM

## 2017-02-20 NOTE — TELEPHONE ENCOUNTER
spoke with patient  Ct scan scheduled on 2/23 at 2:30pm  Patient confirmed appointment and prep instruction were given and location of scan.

## 2017-02-21 ENCOUNTER — PATIENT MESSAGE (OUTPATIENT)
Dept: HEMATOLOGY/ONCOLOGY | Facility: CLINIC | Age: 52
End: 2017-02-21

## 2017-02-22 ENCOUNTER — TELEPHONE (OUTPATIENT)
Dept: HEMATOLOGY/ONCOLOGY | Facility: CLINIC | Age: 52
End: 2017-02-22

## 2017-02-22 ENCOUNTER — PATIENT MESSAGE (OUTPATIENT)
Dept: HEMATOLOGY/ONCOLOGY | Facility: CLINIC | Age: 52
End: 2017-02-22

## 2017-02-22 DIAGNOSIS — I15.8 HYPERTENSION SECONDARY TO DRUG: ICD-10-CM

## 2017-02-22 DIAGNOSIS — M62.838 MUSCLE SPASMS OF LOWER EXTREMITY, UNSPECIFIED LATERALITY: Primary | ICD-10-CM

## 2017-02-22 DIAGNOSIS — T50.905A HYPERTENSION SECONDARY TO DRUG: ICD-10-CM

## 2017-02-22 RX ORDER — CYCLOBENZAPRINE HCL 5 MG
5 TABLET ORAL 3 TIMES DAILY PRN
Qty: 45 TABLET | Refills: 0 | Status: SHIPPED | OUTPATIENT
Start: 2017-02-22 | End: 2017-03-04

## 2017-02-22 RX ORDER — BENAZEPRIL HYDROCHLORIDE 10 MG/1
10 TABLET ORAL DAILY
Qty: 90 TABLET | Refills: 3 | Status: ON HOLD | OUTPATIENT
Start: 2017-02-22 | End: 2017-04-17 | Stop reason: HOSPADM

## 2017-02-22 NOTE — TELEPHONE ENCOUNTER
Returned patient call. Pt reports she is taking the amlodipine 10 mg daily. BP remains elevated. Will add benazepril 10mg. Will recheck BP on Friday when she comes to clinic for scheduled appointment.

## 2017-02-22 NOTE — TELEPHONE ENCOUNTER
----- Message from Chandrika Allison sent at 2/22/2017  9:11 AM CST -----  Contact: self  Pt would like to discuss BP, pt states for the last few days BP has been staying pretty high, last /107.  Contact number [phone]

## 2017-02-23 ENCOUNTER — HOSPITAL ENCOUNTER (OUTPATIENT)
Dept: RADIOLOGY | Facility: HOSPITAL | Age: 52
Discharge: HOME | End: 2017-02-23
Attending: NURSE PRACTITIONER
Payer: COMMERCIAL

## 2017-02-23 ENCOUNTER — PATIENT MESSAGE (OUTPATIENT)
Dept: HEMATOLOGY/ONCOLOGY | Facility: CLINIC | Age: 52
End: 2017-02-23

## 2017-02-23 DIAGNOSIS — C78.00 MALIGNANT NEOPLASM METASTATIC TO LUNG, UNSPECIFIED LATERALITY: ICD-10-CM

## 2017-02-23 DIAGNOSIS — I15.8 HYPERTENSION SECONDARY TO DRUG: Primary | ICD-10-CM

## 2017-02-23 DIAGNOSIS — T50.905A HYPERTENSION SECONDARY TO DRUG: Primary | ICD-10-CM

## 2017-02-23 DIAGNOSIS — C64.9 METASTATIC RENAL CELL CARCINOMA, UNSPECIFIED LATERALITY: ICD-10-CM

## 2017-02-23 PROCEDURE — 71260 CT THORAX DX C+: CPT | Mod: TC

## 2017-02-23 PROCEDURE — 25500020 PHARM REV CODE 255: Performed by: NURSE PRACTITIONER

## 2017-02-23 PROCEDURE — 71260 CT THORAX DX C+: CPT | Mod: 26,,, | Performed by: RADIOLOGY

## 2017-02-23 RX ORDER — AMLODIPINE BESYLATE 10 MG/1
10 TABLET ORAL DAILY
Qty: 30 TABLET | Refills: 11 | Status: ON HOLD | OUTPATIENT
Start: 2017-02-23 | End: 2017-04-17 | Stop reason: HOSPADM

## 2017-02-23 RX ADMIN — IOHEXOL 75 ML: 350 INJECTION, SOLUTION INTRAVENOUS at 03:02

## 2017-02-24 ENCOUNTER — OFFICE VISIT (OUTPATIENT)
Dept: HEMATOLOGY/ONCOLOGY | Facility: CLINIC | Age: 52
End: 2017-02-24
Payer: COMMERCIAL

## 2017-02-24 ENCOUNTER — LAB VISIT (OUTPATIENT)
Dept: LAB | Facility: HOSPITAL | Age: 52
End: 2017-02-24
Payer: COMMERCIAL

## 2017-02-24 VITALS
WEIGHT: 104.25 LBS | HEART RATE: 80 BPM | SYSTOLIC BLOOD PRESSURE: 172 MMHG | HEIGHT: 63 IN | DIASTOLIC BLOOD PRESSURE: 96 MMHG | BODY MASS INDEX: 18.47 KG/M2 | TEMPERATURE: 97 F

## 2017-02-24 DIAGNOSIS — D63.0 ANEMIA IN NEOPLASTIC DISEASE: ICD-10-CM

## 2017-02-24 DIAGNOSIS — C64.2 METASTATIC RENAL CELL CARCINOMA, LEFT: ICD-10-CM

## 2017-02-24 DIAGNOSIS — R59.0 RETROPERITONEAL LYMPHADENOPATHY: ICD-10-CM

## 2017-02-24 DIAGNOSIS — Z90.5 S/P NEPHRECTOMY: ICD-10-CM

## 2017-02-24 DIAGNOSIS — C78.00 MALIGNANT NEOPLASM METASTATIC TO LUNG, UNSPECIFIED LATERALITY: ICD-10-CM

## 2017-02-24 DIAGNOSIS — C79.51 BONE METASTASES: ICD-10-CM

## 2017-02-24 DIAGNOSIS — T45.1X5A CHEMOTHERAPY-INDUCED THROMBOCYTOPENIA: ICD-10-CM

## 2017-02-24 DIAGNOSIS — L27.0 DRUG RASH: ICD-10-CM

## 2017-02-24 DIAGNOSIS — Z51.11 ENCOUNTER FOR CHEMOTHERAPY MANAGEMENT: ICD-10-CM

## 2017-02-24 DIAGNOSIS — M94.0 COSTOCHONDRITIS: ICD-10-CM

## 2017-02-24 DIAGNOSIS — T14.8XXA MUSCLE STRAIN: ICD-10-CM

## 2017-02-24 DIAGNOSIS — I15.8 HYPERTENSION SECONDARY TO DRUG: ICD-10-CM

## 2017-02-24 DIAGNOSIS — I15.9 SECONDARY HYPERTENSION: ICD-10-CM

## 2017-02-24 DIAGNOSIS — D69.59 CHEMOTHERAPY-INDUCED THROMBOCYTOPENIA: ICD-10-CM

## 2017-02-24 DIAGNOSIS — C64.2 METASTATIC RENAL CELL CARCINOMA, LEFT: Primary | ICD-10-CM

## 2017-02-24 DIAGNOSIS — K59.00 CONSTIPATION, UNSPECIFIED CONSTIPATION TYPE: ICD-10-CM

## 2017-02-24 DIAGNOSIS — R07.89 OTHER CHEST PAIN: ICD-10-CM

## 2017-02-24 DIAGNOSIS — T50.905A HYPERTENSION SECONDARY TO DRUG: ICD-10-CM

## 2017-02-24 LAB
ALBUMIN SERPL BCP-MCNC: 3.3 G/DL
ALP SERPL-CCNC: 125 U/L
ALT SERPL W/O P-5'-P-CCNC: 38 U/L
ANION GAP SERPL CALC-SCNC: 8 MMOL/L
AST SERPL-CCNC: 38 U/L
BILIRUB SERPL-MCNC: 0.3 MG/DL
BUN SERPL-MCNC: 21 MG/DL
CALCIUM SERPL-MCNC: 8.9 MG/DL
CHLORIDE SERPL-SCNC: 103 MMOL/L
CO2 SERPL-SCNC: 28 MMOL/L
CREAT SERPL-MCNC: 0.9 MG/DL
ERYTHROCYTE [DISTWIDTH] IN BLOOD BY AUTOMATED COUNT: 21.5 %
EST. GFR  (AFRICAN AMERICAN): >60 ML/MIN/1.73 M^2
EST. GFR  (NON AFRICAN AMERICAN): >60 ML/MIN/1.73 M^2
GLUCOSE SERPL-MCNC: 102 MG/DL
HCT VFR BLD AUTO: 37 %
HGB BLD-MCNC: 11.8 G/DL
MCH RBC QN AUTO: 23.5 PG
MCHC RBC AUTO-ENTMCNC: 31.9 %
MCV RBC AUTO: 74 FL
NEUTROPHILS # BLD AUTO: 2.5 K/UL
PLATELET # BLD AUTO: 139 K/UL
PMV BLD AUTO: ABNORMAL FL
POTASSIUM SERPL-SCNC: 4.2 MMOL/L
PROT SERPL-MCNC: 7 G/DL
RBC # BLD AUTO: 5.02 M/UL
SODIUM SERPL-SCNC: 139 MMOL/L
WBC # BLD AUTO: 5.6 K/UL

## 2017-02-24 PROCEDURE — 99999 PR PBB SHADOW E&M-EST. PATIENT-LVL III: CPT | Mod: PBBFAC,,, | Performed by: NURSE PRACTITIONER

## 2017-02-24 PROCEDURE — 36415 COLL VENOUS BLD VENIPUNCTURE: CPT

## 2017-02-24 PROCEDURE — 1160F RVW MEDS BY RX/DR IN RCRD: CPT | Mod: S$GLB,,, | Performed by: NURSE PRACTITIONER

## 2017-02-24 PROCEDURE — 80053 COMPREHEN METABOLIC PANEL: CPT

## 2017-02-24 PROCEDURE — 85027 COMPLETE CBC AUTOMATED: CPT

## 2017-02-24 PROCEDURE — 99214 OFFICE O/P EST MOD 30 MIN: CPT | Mod: S$GLB,,, | Performed by: NURSE PRACTITIONER

## 2017-02-24 PROCEDURE — 3080F DIAST BP >= 90 MM HG: CPT | Mod: S$GLB,,, | Performed by: NURSE PRACTITIONER

## 2017-02-24 PROCEDURE — 3077F SYST BP >= 140 MM HG: CPT | Mod: S$GLB,,, | Performed by: NURSE PRACTITIONER

## 2017-02-24 RX ORDER — TRAMADOL HYDROCHLORIDE 50 MG/1
50 TABLET ORAL EVERY 6 HOURS PRN
Qty: 30 TABLET | Refills: 0 | Status: ON HOLD | OUTPATIENT
Start: 2017-02-24 | End: 2017-04-17 | Stop reason: HOSPADM

## 2017-02-24 NOTE — PROGRESS NOTES
"Subjective:       Patient ID: Nikia Lamas is a 51 y.o. female.    Chief Complaint: renal cell carcinoma    Rash   Pertinent negatives include no cough, diarrhea, fatigue, fever, shortness of breath, sore throat or vomiting.   Constipation   Pertinent negatives include no abdominal pain, back pain, diarrhea, fever, nausea or vomiting.      Nikia Lamas is a 51 y.o. White female, pt of Dr. Connelly, to clinic for 3 week follow up after starting cabozantinib s/p left nephrectomy with Dr. German on 1/3/17. 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 Rash has resolved with doxycycline. Pt continues to have hypertension. Pt on amlodipine 10 mg and started benazepril 10mg 2 days ago.Pt continues to report pain and "pulling" around the xiphoid process that is worse with movement or changes in position. She is taking naprosyn BID wiith little relief. Taking oxycodone with some relief.    She denies any mouth sores, nausea, vomiting, diarrhea, abdominal pain, weight loss or loss of appetite, chest pain, shortness of breath, leg swelling, fatigue,  headache, dizziness, or mood changes.    Her ECOG PS is zero and her quality of life so far is excellent. She is accompanied by her parents.She denies ever smoking. She denies a personal and family history of kidney cancer.    Oncologic History (From Chart and Patient):  Nikia Lamas is a 51 y.o. White female, referred by Dr. German, for management of metastatic renal cell carcinoma. Pt reports left mass was found incidentally while being worked up for a persistent dry cough that began in October 2016. She was evaluated with a CXR and a non-contrast chest CT.   12/14/16- CT of the chest without contrast showed multiple pulmonary nodes and a large left renal mass present. The L kidney was not fully evaluated as only the superior aspect was in the CT, but the mass was ~ 11 cm in diameter.   12/19/16- CT CAP reveals "Large " "heterogeneous left renal mass, consistent with renal cell carcinoma.  This mass does not appear to invade the renal vein or IVC.  Innumerable pulmonary nodules, retroperitoneal lymphadenopathy and osseous lytic lesions, concerning for metastatic disease. 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   HENT: Negative for mouth sores, sore throat and trouble swallowing.    Eyes: Negative for photophobia and visual disturbance.   Respiratory: Negative for cough, chest tightness and shortness of breath.    Cardiovascular: Positive for chest pain. Negative for leg swelling.   Gastrointestinal: Positive for constipation. Negative for abdominal distention, abdominal pain, diarrhea, nausea and vomiting.   Genitourinary: Negative for dysuria, frequency and hematuria.   Musculoskeletal: Negative for back pain.   Skin: Negative for rash.   Neurological: Negative for dizziness, weakness and headaches.   Hematological: Negative for adenopathy. Does not bruise/bleed easily.   Psychiatric/Behavioral: Negative for sleep disturbance. The patient is not nervous/anxious.    All other systems reviewed and are " negative.      Allergies:  Review of patient's allergies indicates:   Allergen Reactions    No known drug allergies        Medications:  Current Outpatient Prescriptions   Medication Sig Dispense Refill    amlodipine (NORVASC) 10 MG tablet Take 1 tablet (10 mg total) by mouth once daily. 30 tablet 11    benazepril (LOTENSIN) 10 MG tablet Take 1 tablet (10 mg total) by mouth once daily. 90 tablet 3    blood pressure test kit-small Kit Take blood pressure twice daily.      cabozantinib 60 mg Tab Take 60 mg by mouth once daily. 30 tablet 3    cyclobenzaprine (FLEXERIL) 5 MG tablet Take 1 tablet (5 mg total) by mouth 3 (three) times daily as needed for Muscle spasms. 45 tablet 0    multivitamin (ONE DAILY MULTIVITAMIN) per tablet Take 1 tablet by mouth once daily.      naproxen sodium (ANAPROX DS) 550 MG tablet Take 1 tablet (550 mg total) by mouth 2 (two) times daily with meals. 60 tablet 6    ondansetron (ZOFRAN-ODT) 8 MG TbDL Take 1 tablet (8 mg total) by mouth every 6 (six) hours as needed (nausea/vomiting). 20 tablet 0    oxycodone-acetaminophen (PERCOCET) 5-325 mg per tablet Take 1 tablet by mouth every 4 (four) hours as needed for Pain. 41 tablet 0    polyethylene glycol (GLYCOLAX) 17 gram PwPk Take 17 g by mouth once daily. 30 packet 0    valacyclovir (VALTREX) 500 MG tablet TAKE 1 TABLET (500 MG TOTAL) BY MOUTH ONCE DAILY. 30 tablet 12     No current facility-administered medications for this visit.        PMH:  Past Medical History:   Diagnosis Date    Abnormal Pap smear of cervix 10/2015    + HR HPV    Anemia     hx chronic anemia, improved after ablation    Herpes simplex without mention of complication        PSH:  Past Surgical History:   Procedure Laterality Date     SECTION  ,     DILATION AND CURETTAGE OF UTERUS      ENDOMETRIAL ABLATION  2014    Novasure endometrial ablation with D&C    laparoscopy for endometriosis      TUBAL LIGATION      Laparoscopic        FamHx:  Family History   Problem Relation Age of Onset    Alzheimer's disease Maternal Grandmother     Hypertension Mother     Breast cancer Neg Hx     Colon cancer Neg Hx     Ovarian cancer Neg Hx     Diabetes Neg Hx     Stroke Neg Hx        SocHx:  Social History     Social History    Marital status: Single     Spouse name: N/A    Number of children: N/A    Years of education: N/A     Occupational History    Not on file.     Social History Main Topics    Smoking status: Never Smoker    Smokeless tobacco: Never Used    Alcohol use 0.6 oz/week     1 Glasses of wine per week      Comment: social    Drug use: No    Sexual activity: Not Currently     Partners: Male     Birth control/ protection: Surgical, None      Comment: No partner x 1 year     Other Topics Concern    Not on file     Social History Narrative       Objective:      Physical Exam   Constitutional: She is oriented to person, place, and time. She appears well-developed and well-nourished. No distress.   HENT:   Head: Normocephalic and atraumatic.   Nose: Nose normal.   Mouth/Throat: Oropharynx is clear and moist. No oropharyngeal exudate.   Eyes: Conjunctivae and EOM are normal. Pupils are equal, round, and reactive to light. Right eye exhibits no discharge. Left eye exhibits no discharge.   Neck: Normal range of motion. Neck supple. No tracheal deviation present.   Cardiovascular: Normal rate, regular rhythm, normal heart sounds, intact distal pulses and normal pulses.    No swelling to bilateral lower extremities.    Pulmonary/Chest: Effort normal and breath sounds normal. She has no wheezes. She has no rales. She exhibits no mass, no tenderness, no bony tenderness, no crepitus and no swelling.   Abdominal: Soft. Bowel sounds are normal. She exhibits no distension. There is no tenderness. There is no guarding.   Musculoskeletal: Normal range of motion. She exhibits no edema or tenderness.   No spinal or paraspinal tenderness to  "palpation.       Lymphadenopathy:     She has no cervical adenopathy.   Neurological: She is alert and oriented to person, place, and time.   Skin: Skin is warm, dry and intact. No rash noted. She is not diaphoretic. No erythema. No pallor.   Psychiatric: She has a normal mood and affect. Her behavior is normal. Judgment and thought content normal.   Nursing note and vitals reviewed.      LABS:  WBC   Date Value Ref Range Status   02/24/2017 5.60 3.90 - 12.70 K/uL Final     Hemoglobin   Date Value Ref Range Status   02/24/2017 11.8 (L) 12.0 - 16.0 g/dL Final     Hematocrit   Date Value Ref Range Status   02/24/2017 37.0 37.0 - 48.5 % Final     Platelets   Date Value Ref Range Status   02/24/2017 139 (L) 150 - 350 K/uL Final       Chemistry        Component Value Date/Time     02/24/2017 0800    K 4.2 02/24/2017 0800     02/24/2017 0800    CO2 28 02/24/2017 0800    BUN 21 (H) 02/24/2017 0800    CREATININE 0.9 02/24/2017 0800     02/24/2017 0800        Component Value Date/Time    CALCIUM 8.9 02/24/2017 0800    ALKPHOS 125 02/24/2017 0800    AST 38 02/24/2017 0800    ALT 38 02/24/2017 0800    BILITOT 0.3 02/24/2017 0800        CT Chest 2/23/17 shows "The size and conspicuity of these pulmonary nodules has decreased since the prior exam, suggesting a favorable treatment response to treatment. "    Assessment:       1. Metastatic renal cell carcinoma, left    2. Malignant neoplasm metastatic to lung, unspecified laterality    3. Bone metastases    4. Retroperitoneal lymphadenopathy    5. S/p nephrectomy    6. Encounter for chemotherapy management    7. Anemia in neoplastic disease    8. Chemotherapy-induced thrombocytopenia    9. 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 Drug rash    11. Muscle strain    12. Other chest pain        Plan:       1,2,3,4,5,6-Metastatic renal cell carcinoma    Staging form: Kidney, AJCC 7th Edition      Pathologic stage from 1/3/2017: Stage IV (T3a, N1, M1) - " Unsigned      Nikia Lamas is a 51 y.o. White female to clinic to finalize treatment for metastatic renal cell carcinoma. A thorough discussion was previously had regarding metastatic renal cell carcinoma and that is not a curable disease but it is a highly treatable disease.     Pt now s/p cytoreductive open left radical nephrectomy with Dr. German. 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 Pt continues with alkaline and low sugar diet to help with her cancer despite previously explaining that there is little scientific evidence to suggest any oncologic benefit, however this would likely cause [id number] harm. Additionally, she is taking taheebo tea.  She strongly wishes to continue this for now. KEYNOTE clinical trial is not enrolling participants at this time. Will start cabozantinib. Alternative therapies include immunotherapy.     Pt has been on cabozantinib for 6 weeks, tolerating relatively well. CT scan for evaluation of chest pain shows improvement of lung disease. Will continue with cabozantinib for now. RTC in 4 weeks with (CBC,CMP) and to see me. Will plan to get restaging CT scans in early April. May order bone scan at that time.    7- Improving, will monitor.    8- Mild, will monitor.    9- Secondary to cabozantinib. Pt on amlodipine 10 mg and started benazepril 10mg 2 days ago. Continue to take blood pressure bid and keep a log. Discussed that it is a good sign that the medication is working. Will continue to titrate medication as needed.    10- Resolved with doxycycline 100mg bid for 2 weeks.    11-  CT scan with contrast shows no indication for pain. No pain on palpation to chest well. I believe it is musculoskeletal in nature. Try tramadol during the day.        Patient is in agreement with the proposed treatment plan. All questions were answered to the patient's satisfaction. Pt knows to call clinic if anything is needed before the next clinic  visit.    Opal Ibarra, SHANIAP-C  Hematology and Medical Oncology

## 2017-03-03 ENCOUNTER — PATIENT MESSAGE (OUTPATIENT)
Dept: HEMATOLOGY/ONCOLOGY | Facility: CLINIC | Age: 52
End: 2017-03-03

## 2017-03-05 ENCOUNTER — PATIENT MESSAGE (OUTPATIENT)
Dept: HEMATOLOGY/ONCOLOGY | Facility: CLINIC | Age: 52
End: 2017-03-05

## 2017-03-05 DIAGNOSIS — R11.2 CHEMOTHERAPY INDUCED NAUSEA AND VOMITING: Primary | ICD-10-CM

## 2017-03-05 DIAGNOSIS — T45.1X5A CHEMOTHERAPY INDUCED NAUSEA AND VOMITING: Primary | ICD-10-CM

## 2017-03-06 RX ORDER — ONDANSETRON 8 MG/1
8 TABLET, ORALLY DISINTEGRATING ORAL EVERY 12 HOURS PRN
Qty: 20 TABLET | Refills: 0 | Status: ON HOLD | OUTPATIENT
Start: 2017-03-06 | End: 2017-04-18

## 2017-03-09 ENCOUNTER — TELEPHONE (OUTPATIENT)
Dept: PHARMACY | Facility: CLINIC | Age: 52
End: 2017-03-09

## 2017-03-10 ENCOUNTER — TELEPHONE (OUTPATIENT)
Dept: PHARMACY | Facility: CLINIC | Age: 52
End: 2017-03-10

## 2017-03-12 ENCOUNTER — PATIENT MESSAGE (OUTPATIENT)
Dept: HEMATOLOGY/ONCOLOGY | Facility: CLINIC | Age: 52
End: 2017-03-12

## 2017-03-14 ENCOUNTER — PATIENT MESSAGE (OUTPATIENT)
Dept: HEMATOLOGY/ONCOLOGY | Facility: CLINIC | Age: 52
End: 2017-03-14

## 2017-03-14 DIAGNOSIS — C64.2 METASTATIC RENAL CELL CARCINOMA, LEFT: Primary | ICD-10-CM

## 2017-03-14 DIAGNOSIS — R59.0 RETROPERITONEAL LYMPHADENOPATHY: ICD-10-CM

## 2017-03-14 DIAGNOSIS — R53.81 PHYSICAL DECONDITIONING: ICD-10-CM

## 2017-03-14 DIAGNOSIS — C78.00 MALIGNANT NEOPLASM METASTATIC TO LUNG, UNSPECIFIED LATERALITY: ICD-10-CM

## 2017-03-14 DIAGNOSIS — Z90.5 S/P NEPHRECTOMY: ICD-10-CM

## 2017-03-14 DIAGNOSIS — C79.51 BONE METASTASES: ICD-10-CM

## 2017-03-16 ENCOUNTER — PATIENT MESSAGE (OUTPATIENT)
Dept: HEMATOLOGY/ONCOLOGY | Facility: CLINIC | Age: 52
End: 2017-03-16

## 2017-03-21 ENCOUNTER — PATIENT MESSAGE (OUTPATIENT)
Dept: HEMATOLOGY/ONCOLOGY | Facility: CLINIC | Age: 52
End: 2017-03-21

## 2017-03-23 ENCOUNTER — PATIENT MESSAGE (OUTPATIENT)
Dept: HEMATOLOGY/ONCOLOGY | Facility: CLINIC | Age: 52
End: 2017-03-23

## 2017-03-24 ENCOUNTER — PATIENT MESSAGE (OUTPATIENT)
Dept: HEMATOLOGY/ONCOLOGY | Facility: CLINIC | Age: 52
End: 2017-03-24

## 2017-03-24 ENCOUNTER — OFFICE VISIT (OUTPATIENT)
Dept: HEMATOLOGY/ONCOLOGY | Facility: CLINIC | Age: 52
End: 2017-03-24
Payer: COMMERCIAL

## 2017-03-24 ENCOUNTER — LAB VISIT (OUTPATIENT)
Dept: LAB | Facility: HOSPITAL | Age: 52
End: 2017-03-24
Attending: NURSE PRACTITIONER
Payer: COMMERCIAL

## 2017-03-24 VITALS
HEIGHT: 64 IN | TEMPERATURE: 98 F | BODY MASS INDEX: 16.9 KG/M2 | WEIGHT: 99 LBS | DIASTOLIC BLOOD PRESSURE: 83 MMHG | SYSTOLIC BLOOD PRESSURE: 143 MMHG | RESPIRATION RATE: 15 BRPM | HEART RATE: 99 BPM

## 2017-03-24 DIAGNOSIS — C78.00 MALIGNANT NEOPLASM METASTATIC TO LUNG, UNSPECIFIED LATERALITY: ICD-10-CM

## 2017-03-24 DIAGNOSIS — L27.0 DRUG RASH: ICD-10-CM

## 2017-03-24 DIAGNOSIS — R53.81 PHYSICAL DECONDITIONING: ICD-10-CM

## 2017-03-24 DIAGNOSIS — R07.89 OTHER CHEST PAIN: ICD-10-CM

## 2017-03-24 DIAGNOSIS — K59.00 CONSTIPATION, UNSPECIFIED CONSTIPATION TYPE: ICD-10-CM

## 2017-03-24 DIAGNOSIS — C79.51 BONE METASTASES: ICD-10-CM

## 2017-03-24 DIAGNOSIS — Z90.5 S/P NEPHRECTOMY: ICD-10-CM

## 2017-03-24 DIAGNOSIS — T45.1X5A CHEMOTHERAPY-INDUCED THROMBOCYTOPENIA: ICD-10-CM

## 2017-03-24 DIAGNOSIS — T50.905A HYPERTENSION SECONDARY TO DRUG: ICD-10-CM

## 2017-03-24 DIAGNOSIS — T45.1X5A CHEMOTHERAPY INDUCED NAUSEA AND VOMITING: ICD-10-CM

## 2017-03-24 DIAGNOSIS — I15.8 HYPERTENSION SECONDARY TO DRUG: ICD-10-CM

## 2017-03-24 DIAGNOSIS — C64.2 METASTATIC RENAL CELL CARCINOMA, LEFT: Primary | ICD-10-CM

## 2017-03-24 DIAGNOSIS — Z51.11 ENCOUNTER FOR CHEMOTHERAPY MANAGEMENT: ICD-10-CM

## 2017-03-24 DIAGNOSIS — D63.0 ANEMIA IN NEOPLASTIC DISEASE: ICD-10-CM

## 2017-03-24 DIAGNOSIS — R11.2 CHEMOTHERAPY INDUCED NAUSEA AND VOMITING: ICD-10-CM

## 2017-03-24 DIAGNOSIS — R59.0 RETROPERITONEAL LYMPHADENOPATHY: ICD-10-CM

## 2017-03-24 DIAGNOSIS — T14.8XXA MUSCLE STRAIN: ICD-10-CM

## 2017-03-24 DIAGNOSIS — C64.2 METASTATIC RENAL CELL CARCINOMA, LEFT: ICD-10-CM

## 2017-03-24 DIAGNOSIS — M54.9 BACK PAIN, UNSPECIFIED BACK LOCATION, UNSPECIFIED BACK PAIN LATERALITY, UNSPECIFIED CHRONICITY: ICD-10-CM

## 2017-03-24 DIAGNOSIS — D69.59 CHEMOTHERAPY-INDUCED THROMBOCYTOPENIA: ICD-10-CM

## 2017-03-24 LAB
ALBUMIN SERPL BCP-MCNC: 2.6 G/DL
ALP SERPL-CCNC: 86 U/L
ALT SERPL W/O P-5'-P-CCNC: 21 U/L
ANION GAP SERPL CALC-SCNC: 11 MMOL/L
AST SERPL-CCNC: 22 U/L
BILIRUB SERPL-MCNC: 0.3 MG/DL
BUN SERPL-MCNC: 11 MG/DL
CALCIUM SERPL-MCNC: 8.8 MG/DL
CHLORIDE SERPL-SCNC: 98 MMOL/L
CO2 SERPL-SCNC: 29 MMOL/L
CREAT SERPL-MCNC: 0.7 MG/DL
ERYTHROCYTE [DISTWIDTH] IN BLOOD BY AUTOMATED COUNT: 25.3 %
EST. GFR  (AFRICAN AMERICAN): >60 ML/MIN/1.73 M^2
EST. GFR  (NON AFRICAN AMERICAN): >60 ML/MIN/1.73 M^2
GLUCOSE SERPL-MCNC: 108 MG/DL
HCT VFR BLD AUTO: 34.2 %
HGB BLD-MCNC: 11 G/DL
MCH RBC QN AUTO: 25.1 PG
MCHC RBC AUTO-ENTMCNC: 32.2 %
MCV RBC AUTO: 78 FL
NEUTROPHILS # BLD AUTO: 5.6 K/UL
PLATELET # BLD AUTO: 297 K/UL
PMV BLD AUTO: 9.5 FL
POTASSIUM SERPL-SCNC: 5.1 MMOL/L
PROT SERPL-MCNC: 6.8 G/DL
RBC # BLD AUTO: 4.39 M/UL
SODIUM SERPL-SCNC: 138 MMOL/L
WBC # BLD AUTO: 7.14 K/UL

## 2017-03-24 PROCEDURE — 99215 OFFICE O/P EST HI 40 MIN: CPT | Mod: S$GLB,,, | Performed by: INTERNAL MEDICINE

## 2017-03-24 PROCEDURE — 85027 COMPLETE CBC AUTOMATED: CPT

## 2017-03-24 PROCEDURE — 99999 PR PBB SHADOW E&M-EST. PATIENT-LVL III: CPT | Mod: PBBFAC,,, | Performed by: INTERNAL MEDICINE

## 2017-03-24 PROCEDURE — 3079F DIAST BP 80-89 MM HG: CPT | Mod: S$GLB,,, | Performed by: INTERNAL MEDICINE

## 2017-03-24 PROCEDURE — 36415 COLL VENOUS BLD VENIPUNCTURE: CPT

## 2017-03-24 PROCEDURE — 3077F SYST BP >= 140 MM HG: CPT | Mod: S$GLB,,, | Performed by: INTERNAL MEDICINE

## 2017-03-24 PROCEDURE — 80053 COMPREHEN METABOLIC PANEL: CPT

## 2017-03-24 RX ORDER — LACTULOSE 10 G/15ML
30 SOLUTION ORAL EVERY 6 HOURS PRN
Qty: 1350 ML | Refills: 0 | Status: SHIPPED | OUTPATIENT
Start: 2017-03-24 | End: 2017-03-27 | Stop reason: SDUPTHER

## 2017-03-24 NOTE — PROGRESS NOTES
"Subjective:       Patient ID: Nikia Lamas is a 51 y.o. female.    Chief Complaint: Metastatic renal cell carcinoma and Results (labs)    HPI   Nikia Lamas is a 51 y.o. White female to clinic for 4 week follow up after starting cabozantinib on 1/25/17 s/p left nephrectomy with Dr. German on 1/3/17. 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 Rash has resolved with doxycycline. Blood pressures better controlled on amlodipine 10 mg and benazepril 10mg. Pt reports pain surrounding xiphoid process has improved but continues to report back pain. Taking oxycodone with some relief. Pt has new pt appt for physical therapy on Tuesday. Additionally, reports constipation despite taking stool softener bid and Miralax daily. Had small bowel movement this am after taking mag citrate last night.    She denies any mouth sores, nausea, vomiting, diarrhea, abdominal pain, weight loss or loss of appetite, chest pain, shortness of breath, leg swelling, fatigue,  headache, dizziness, or mood changes.    Her ECOG PS is 1 and her quality of life so far is excellent. She is accompanied by her parents.She denies ever smoking. She denies a personal and family history of kidney cancer.    Oncologic History (From Chart and Patient):  Nikia Lamas is a 51 y.o. White female, referred by Dr. German, for management of metastatic renal cell carcinoma. Pt reports left mass was found incidentally while being worked up for a persistent dry cough that began in October 2016. She was evaluated with a CXR and a non-contrast chest CT.   12/14/16- CT of the chest without contrast showed multiple pulmonary nodes and a large left renal mass present. The L kidney was not fully evaluated as only the superior aspect was in the CT, but the mass was ~ 11 cm in diameter.   12/19/16- CT CAP reveals "Large heterogeneous left renal mass, consistent with renal cell carcinoma.  This mass does not appear to invade " "the renal vein or IVC.  Innumerable pulmonary nodules, retroperitoneal lymphadenopathy and osseous lytic lesions, concerning for metastatic disease. 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 Negative for diaphoresis.   HENT: Negative for mouth sores and trouble swallowing.         Mouth sensitivity   Eyes: Negative for photophobia and visual disturbance.   Respiratory: Negative for cough, chest tightness and shortness of breath.    Cardiovascular: Positive for chest pain. Negative for leg swelling.   Gastrointestinal: Positive for abdominal pain and constipation. Negative for abdominal distention and diarrhea.   Genitourinary: Negative for dysuria, frequency and hematuria.   Musculoskeletal: Positive for back pain.   Skin: Negative for rash.        Blisters to fingers healing   Neurological: Negative for dizziness, weakness and headaches.   Hematological: Negative for adenopathy. Does not bruise/bleed easily.   Psychiatric/Behavioral: Negative for sleep disturbance. The patient is not nervous/anxious.    All other systems reviewed and are negative.      Allergies:  Review of " patient's allergies indicates:   Allergen Reactions    No known drug allergies        Medications:  Current Outpatient Prescriptions   Medication Sig Dispense Refill    amlodipine (NORVASC) 10 MG tablet Take 1 tablet (10 mg total) by mouth once daily. 30 tablet 11    benazepril (LOTENSIN) 10 MG tablet Take 1 tablet (10 mg total) by mouth once daily. 90 tablet 3    blood pressure test kit-small Kit Take blood pressure twice daily.      cabozantinib 60 mg Tab Take 60 mg by mouth once daily. 30 tablet 3    multivitamin (ONE DAILY MULTIVITAMIN) per tablet Take 1 tablet by mouth once daily.      naproxen sodium (ANAPROX DS) 550 MG tablet Take 1 tablet (550 mg total) by mouth 2 (two) times daily with meals. 60 tablet 6    ondansetron (ZOFRAN-ODT) 8 MG TbDL Take 1 tablet (8 mg total) by mouth every 12 (twelve) hours as needed (nausea/vomiting). 20 tablet 0    oxycodone-acetaminophen (PERCOCET) 5-325 mg per tablet Take 1 tablet by mouth every 4 (four) hours as needed for Pain. 41 tablet 0    polyethylene glycol (GLYCOLAX) 17 gram PwPk Take 17 g by mouth once daily. 30 packet 0    tramadol (ULTRAM) 50 mg tablet Take 1 tablet (50 mg total) by mouth every 6 (six) hours as needed for Pain. 30 tablet 0    valacyclovir (VALTREX) 500 MG tablet TAKE 1 TABLET (500 MG TOTAL) BY MOUTH ONCE DAILY. 30 tablet 12     No current facility-administered medications for this visit.        PMH:  Past Medical History:   Diagnosis Date    Abnormal Pap smear of cervix 10/2015    + HR HPV    Anemia     hx chronic anemia, improved after ablation    Herpes simplex without mention of complication        PSH:  Past Surgical History:   Procedure Laterality Date     SECTION  ,     DILATION AND CURETTAGE OF UTERUS      ENDOMETRIAL ABLATION  2014    Novasure endometrial ablation with D&C    laparoscopy for endometriosis      TUBAL LIGATION  2006    Laparoscopic       FamHx:  Family History   Problem Relation Age  of Onset    Alzheimer's disease Maternal Grandmother     Hypertension Mother     Breast cancer Neg Hx     Colon cancer Neg Hx     Ovarian cancer Neg Hx     Diabetes Neg Hx     Stroke Neg Hx        SocHx:  Social History     Social History    Marital status: Single     Spouse name: N/A    Number of children: N/A    Years of education: N/A     Occupational History    Not on file.     Social History Main Topics    Smoking status: Never Smoker    Smokeless tobacco: Never Used    Alcohol use 0.6 oz/week     1 Glasses of wine per week      Comment: social    Drug use: No    Sexual activity: Not Currently     Partners: Male     Birth control/ protection: Surgical, None      Comment: No partner x 1 year     Other Topics Concern    Not on file     Social History Narrative       Objective:      Physical Exam   Constitutional: She is oriented to person, place, and time. She appears well-developed and well-nourished. No distress.   Thin appearance   HENT:   Head: Normocephalic and atraumatic.   Nose: Nose normal.   Mouth/Throat: Oropharynx is clear and moist. No oropharyngeal exudate.   Eyes: Conjunctivae and EOM are normal. Pupils are equal, round, and reactive to light. Right eye exhibits no discharge. Left eye exhibits no discharge.   Neck: Normal range of motion. Neck supple. No tracheal deviation present.   Cardiovascular: Normal rate, regular rhythm, normal heart sounds and normal pulses.    No swelling to bilateral lower extremities.    Pulmonary/Chest: Effort normal and breath sounds normal. She has no wheezes. She has no rales. She exhibits no mass, no tenderness, no bony tenderness, no crepitus and no swelling.   Abdominal: Soft. Bowel sounds are normal. She exhibits no distension. There is no tenderness. There is no guarding.   Musculoskeletal: Normal range of motion. She exhibits no edema or tenderness.   Lymphadenopathy:     She has no cervical adenopathy.   Neurological: She is alert and oriented  "to person, place, and time.   Skin: Skin is warm, dry and intact. No rash noted. She is not diaphoretic. No erythema. No pallor.   Psychiatric: She has a normal mood and affect. Her behavior is normal. Judgment and thought content normal.   Nursing note and vitals reviewed.      LABS:  WBC   Date Value Ref Range Status   03/24/2017 7.14 3.90 - 12.70 K/uL Final     Hemoglobin   Date Value Ref Range Status   03/24/2017 11.0 (L) 12.0 - 16.0 g/dL Final     Hematocrit   Date Value Ref Range Status   03/24/2017 34.2 (L) 37.0 - 48.5 % Final     Platelets   Date Value Ref Range Status   03/24/2017 297 150 - 350 K/uL Final       Chemistry        Component Value Date/Time     03/24/2017 0804    K 5.1 03/24/2017 0804    CL 98 03/24/2017 0804    CO2 29 03/24/2017 0804    BUN 11 03/24/2017 0804    CREATININE 0.7 03/24/2017 0804     03/24/2017 0804        Component Value Date/Time    CALCIUM 8.8 03/24/2017 0804    ALKPHOS 86 03/24/2017 0804    AST 22 03/24/2017 0804    ALT 21 03/24/2017 0804    BILITOT 0.3 03/24/2017 0804        CT Chest 2/23/17 shows "The size and conspicuity of these pulmonary nodules has decreased since the prior exam, suggesting a favorable treatment response to treatment. "    Assessment:       1. Metastatic renal cell carcinoma, left    2. Malignant neoplasm metastatic to lung, unspecified laterality    3. Bone metastases    4. Retroperitoneal lymphadenopathy    5. S/p nephrectomy    6. Encounter for chemotherapy management    7. Anemia in neoplastic disease    8. 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 Physical deconditioning    10. Chemotherapy induced nausea and vomiting    11. Drug rash    12. Back pain, unspecified back location, unspecified back pain laterality, unspecified chronicity    13. Constipation, unspecified constipation type        Plan:       1,2,3,4,5,6-Metastatic renal cell carcinoma    Staging form: Kidney, AJCC 7th Edition      Pathologic stage from 1/3/2017: Stage IV (T3a, " N1, M1) - Unsigned      Nikia Lamas is a 51 y.o. White female to clinic to finalize treatment for metastatic renal cell carcinoma. A thorough discussion was previously had regarding metastatic renal cell carcinoma and that is not a curable disease but it is a highly treatable disease.     Pt now s/p cytoreductive open left radical nephrectomy with Dr. German. 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 Pt continues with alkaline and low sugar diet to help with her cancer despite previously explaining that there is little scientific evidence to suggest any oncologic benefit, however this would likely cause [id number] harm. Additionally, she is taking taheebo tea.  She strongly wishes to continue this for now. KEYNOTE clinical trial is not enrolling participants at this time. Will start cabozantinib. Alternative therapies include immunotherapy.     Pt has been on cabozantinib for 2 months, tolerating relatively well. CT scan for evaluation of chest pain shows improvement of lung disease. Will continue with cabozantinib for now. Discussed starting Xgeva, will get dental clearance. RTC in 6 weeks with (CBC,CMP), CT CAP, bone scan, and to see me and Dr. Connelly.     7-Mild, will monitor.    8-Secondary to cabozantinib.Discussed that it is a good sign that the medication is working.  Pt on amlodipine 10 mg and  benazepril 10mg 2 days ago. Will continue to titrate medication as needed.    9- Seeing physical therapy on Tuesday,    10- Controlled with antiemetics.    11- Resolved with doxycycline 100mg bid for 2 weeks.    12- Unclear etiology. Will see if PT helps. Can use OTC pain medication PRN.    13- Use fleets enema today and start lactulose as prescribed. Continue stool softener and Mirlax BID. Will call if no improvement.    More than 60 mins were spent during this encounter, greater than 50% was spent in direct counseling and/or coordination of care.     Patient was  also seen and examined by Dr. Connelly. Patient is in agreement with the proposed treatment plan. All questions were answered to the patient's satisfaction. Pt knows to call clinic if anything is needed before the next clinic visit.    MARTHA Yadav-C  Hematology and Medical Oncology        I have reviewed the notes, assessments, and/or procedures performed by the nurse practitioner, as above.  I have personally interviewed the patient at the beside, and rounded with the nurse practitioner. I formulated the plan of care.  I concur with her documentation of Nikia Lamas.  More than 45 mins were spent during this encounter, greater than 50% was spent in direct counseling and/or coordination of care.     Jerson Connelly M.D., M.S., F.A.C.P.  Hematology/Oncology Attending  [facility]

## 2017-03-26 ENCOUNTER — PATIENT MESSAGE (OUTPATIENT)
Dept: HEMATOLOGY/ONCOLOGY | Facility: CLINIC | Age: 52
End: 2017-03-26

## 2017-03-27 ENCOUNTER — PATIENT MESSAGE (OUTPATIENT)
Dept: HEMATOLOGY/ONCOLOGY | Facility: CLINIC | Age: 52
End: 2017-03-27

## 2017-03-27 DIAGNOSIS — R11.2 CHEMOTHERAPY INDUCED NAUSEA AND VOMITING: ICD-10-CM

## 2017-03-27 DIAGNOSIS — L27.0 DRUG RASH: ICD-10-CM

## 2017-03-27 DIAGNOSIS — I15.8 HYPERTENSION SECONDARY TO DRUG: ICD-10-CM

## 2017-03-27 DIAGNOSIS — T45.1X5A CHEMOTHERAPY INDUCED NAUSEA AND VOMITING: ICD-10-CM

## 2017-03-27 DIAGNOSIS — D63.0 ANEMIA IN NEOPLASTIC DISEASE: ICD-10-CM

## 2017-03-27 DIAGNOSIS — R53.81 PHYSICAL DECONDITIONING: ICD-10-CM

## 2017-03-27 DIAGNOSIS — K59.00 CONSTIPATION, UNSPECIFIED CONSTIPATION TYPE: Primary | ICD-10-CM

## 2017-03-27 DIAGNOSIS — Z90.5 S/P NEPHRECTOMY: ICD-10-CM

## 2017-03-27 DIAGNOSIS — C64.2 METASTATIC RENAL CELL CARCINOMA, LEFT: ICD-10-CM

## 2017-03-27 DIAGNOSIS — R63.0 DECREASED APPETITE: Primary | ICD-10-CM

## 2017-03-27 DIAGNOSIS — T50.905A HYPERTENSION SECONDARY TO DRUG: ICD-10-CM

## 2017-03-27 DIAGNOSIS — R59.0 RETROPERITONEAL LYMPHADENOPATHY: ICD-10-CM

## 2017-03-27 DIAGNOSIS — Z51.11 ENCOUNTER FOR CHEMOTHERAPY MANAGEMENT: ICD-10-CM

## 2017-03-27 DIAGNOSIS — C78.00 MALIGNANT NEOPLASM METASTATIC TO LUNG, UNSPECIFIED LATERALITY: ICD-10-CM

## 2017-03-27 DIAGNOSIS — C79.51 BONE METASTASES: ICD-10-CM

## 2017-03-27 RX ORDER — LACTULOSE 10 G/15ML
30 SOLUTION ORAL EVERY 6 HOURS PRN
Qty: 500 ML | Refills: 0 | Status: ON HOLD | OUTPATIENT
Start: 2017-03-27 | End: 2017-04-17

## 2017-03-27 RX ORDER — CYPROHEPTADINE HYDROCHLORIDE 4 MG/1
4 TABLET ORAL 3 TIMES DAILY PRN
Qty: 90 TABLET | Refills: 0 | Status: ON HOLD | OUTPATIENT
Start: 2017-03-27 | End: 2017-04-17 | Stop reason: HOSPADM

## 2017-03-28 ENCOUNTER — CLINICAL SUPPORT (OUTPATIENT)
Dept: REHABILITATION | Facility: HOSPITAL | Age: 52
End: 2017-03-28
Attending: NURSE PRACTITIONER
Payer: COMMERCIAL

## 2017-03-28 ENCOUNTER — PATIENT MESSAGE (OUTPATIENT)
Dept: HEMATOLOGY/ONCOLOGY | Facility: CLINIC | Age: 52
End: 2017-03-28

## 2017-03-28 DIAGNOSIS — G89.29 CHRONIC BILATERAL LOW BACK PAIN WITHOUT SCIATICA: Primary | ICD-10-CM

## 2017-03-28 DIAGNOSIS — G89.29 CHRONIC BILATERAL THORACIC BACK PAIN: ICD-10-CM

## 2017-03-28 DIAGNOSIS — M54.50 CHRONIC BILATERAL LOW BACK PAIN WITHOUT SCIATICA: Primary | ICD-10-CM

## 2017-03-28 DIAGNOSIS — M62.81 MUSCLE WEAKNESS: ICD-10-CM

## 2017-03-28 DIAGNOSIS — M54.6 CHRONIC BILATERAL THORACIC BACK PAIN: ICD-10-CM

## 2017-03-28 PROCEDURE — 97110 THERAPEUTIC EXERCISES: CPT | Performed by: PHYSICAL THERAPIST

## 2017-03-28 PROCEDURE — 97162 PT EVAL MOD COMPLEX 30 MIN: CPT | Performed by: PHYSICAL THERAPIST

## 2017-03-28 NOTE — PLAN OF CARE
Name:Nikia Lamas  Physician:Opal Ibarra NP  Date of eval:3/28/2017  Orders:  Physical Therapy evaluate and treat  Clinic: [id number]  Diagnosis:  1. Chronic bilateral low back pain without sciatica     2. Chronic bilateral thoracic back pain     3. Muscle weakness         Precautions: Metastatic cancer   Evaluation date: 3/28/2017  Visit # authorized: 1/20  Authorization period: 12-31-17  Plan of care expiration: 5-8-17  MD Follow up appt: none scheduled    Subjective     Chief complaint: back pain and in trunk  Onset of pain : January 25   Mechanism of onset :  At start of chemo, muscle spasm in L breast and chest region and now in back region staying in trunk, no leg symptoms    January 3 had surgery for removal of L kidney     Aggravating factors: getting up out of bed a little better since doing a little more stretching  Easing factors: moving a criss  Sleep is disturbed. 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 30 tablet 11    benazepril (LOTENSIN) 10 MG tablet Take 1 tablet (10 mg total) by mouth once  "daily. 90 tablet 3    blood pressure test kit-small Kit Take blood pressure twice daily.      cabozantinib 60 mg Tab Take 60 mg by mouth once daily. 30 tablet 3    cyproheptadine (PERIACTIN) 4 mg tablet Take 1 tablet (4 mg total) by mouth 3 (three) times daily as needed. 90 tablet 0    lactulose (CHRONULAC) 20 gram/30 mL Soln Take 45 mLs (30 g total) by mouth every 6 (six) hours as needed. Start every 3 hours until bowel movement, then q6 PRN. 500 mL 0    multivitamin (ONE DAILY MULTIVITAMIN) per tablet Take 1 tablet by mouth once daily.      naproxen sodium (ANAPROX DS) 550 MG tablet Take 1 tablet (550 mg total) by mouth 2 (two) times daily with meals. 60 tablet 6    ondansetron (ZOFRAN-ODT) 8 MG TbDL Take 1 tablet (8 mg total) by mouth every 12 (twelve) hours as needed (nausea/vomiting). 20 tablet 0    oxycodone-acetaminophen (PERCOCET) 5-325 mg per tablet Take 1 tablet by mouth every 4 (four) hours as needed for Pain. 41 tablet 0    polyethylene glycol (GLYCOLAX) 17 gram PwPk Take 17 g by mouth once daily. 30 packet 0    tramadol (ULTRAM) 50 mg tablet Take 1 tablet (50 mg total) by mouth every 6 (six) hours as needed for Pain. 30 tablet 0    valacyclovir (VALTREX) 500 MG tablet TAKE 1 TABLET (500 MG TOTAL) BY MOUTH ONCE DAILY. 30 tablet 12     No current facility-administered medications on file prior to visit.        Special tests: none related to back    Pain level with 0 being the lowest and 10 being the highest presently: none at rest  Pain level with 0 being the lowest and 10 being the highest at worst: severe with movement as with rolling in bed  Pain level with 0 being the lowest and 10 being the highest at best: none     Patient Goals: "be able to move in bed without pain"    Objective     Postural examination in standing:  - flattened lumbar lordosis  - forward head  - forward shoulders  - R hip high  - L shoulder high    Postural examination in sitting:   - flattened lumbar lordosis  - " forward head  - forward shoulders      Functional assessment:   - walking: FB posturing of trunk, shuffling, pt with stomach issues this AM  - sit to stand: min assist  - sit to supine: min assist       - supine to sit: min assist  - supine to prone: N/T    Pelvic positioning: AR R    Lumbar active range of motion in standing is: N/T           Flexibility testing:  - hamstrings:     90/90 test R 25 L 30                Muscle Strength deferred due to not feeling well.  Pt able to bridge 25%      Endurance is  poor.    Lumbar Special tests:  SLR neg    Palpation: no c/o     Joint mobility: N/T  Other: pt reports her skin at coccyx is getting raw and sore when lying on back      PT Evaluation Completed? No, due to not feeling well will do FOTO, further MMT and visual check of buttocks for skin breakdown  Discussed Plan of Care with patient: Yes      TREATMENT:  Therapeutic exercise: Nikia received therapeutic exercises to develop strength, stabilization and endurance; flexibility and range of motion for 15 minutes including:see HEP sheet.     Instructed pt in need to unload coccyx area and to avoid lying on back for prolonged periods and to use pillow between knees and to hug pillow for upper back when on back    Piriformis stretch x 10  Bridging x 10  Trunk rotation stretch x 10  Modified push/pull x 3    Pt. Education: Instructed pt. regarding:proper technique with all exercises. Pt. to demonstrate good understanding of the education provided. Nikia demonstrated good return demonstration of activities. No cultural, environmental, or spiritual barriers identified to treatment or learning.  Assessment   This is a 51 y.o. female referred to outpatient physical therapy and presents with a medical diagnosis of Metastatic renal cell carcinoma L, metastasis to lung, bone metastases s/p nephrectomy/physical deconditioning and PT diagnosis/findings of back pain with muscle spasms with pelvic dysfunction and muscle weakness  demonstrating limitations as described in the problem list. Patient was in agreement with set goals and plan of care. Pt was given a written HEP along with posture education, instruction on body mechanics, activity modification/avoidance, and core/lumbar/LE strengthening regimen. Pt. verbally understood instructions and demonstrated proper form/technique. Pt was advised to perform these exercises free of pain, and discontinue use if symptoms persist/worsen. Pt will benefit from physical therapy services in order to maximize pain free functional independence. Rehab potential is good.  History  Co-morbidities and personal factors that may impact the plan of care Examination  Body Structures and Functions, activity limitations and participation restrictions that may impact the plan of care Clinical Presentation   Decision Making/ Complexity Score   Co-morbidities: metastatic cancer poor nutrition                Personal Factors:    Body Regions: LB and extremities    Body Systems: musculoskeletal ROM strength          Activity limitations: walking, bed sitting      Participation Restrictions: ability to work         Evolving with changing characteristics  mod         Medical necessity is demonstrated by the following IMPAIRMENTS/PROBLEM LIST:  Decreased range of motion  Decreased strength  Pelvic dysfunction  Increased pain with movement in bed  Gait dysfunction  Increased pain with walking  Disturbed sleep    GOALS:   Short Term Goals:  3 weeks  Increase range of motion 25%  Increase strength 1/2 muscle grade  Improve postural awareness of pelvis to independently identify dysfunction with min assist from PT  Be able to perform HEP with minimal cueing required      Long Term Goals: 6 weeks  Increase range of motion to 75% to 100% full   Improve muscle strength 1 muscle grade  Improve and stabilize proper pelvic positioning  Restore ability to ambulate with normal pain free gait  Walking for ADL will be restored without  increased pain  Restore ability to move in bed without increased pain  Restore normal sleep habits without disturbances due to pain      Plan     Pt will be treated by physical therapy 1-3 times a week for 6 weeks to include: Therapeutic exercises to increase ROM, strength and stabilization; joint and soft tissue mobilization with manual therapy techniques to decrease muscle tightness, pain and improve joint mobility; neuromuscular re-education to improve balance, coordination, kinesthetic sense and proprioception, therapeutic activities to improve coordination, strength and function, therapeutic taping to decrease pain, provide support and improve function; modalities such as moist heat, ice,  and electrical stimulation to increase circulation, decrease pain and inflammation;  temporary orthotics will be considered and utilized as needed to further decrease pain in WB; aquatic physical therapy will be utilized as needed.  Pt may be seen by PTA to carry out plan of care as part of the Rehab team.    I certify the need for these services furnished under this plan of treatment and while under my care.    ____________________________________ Physician/Referring Practitioner                                Date of Signature

## 2017-03-28 NOTE — PROGRESS NOTES
Name:Nikia Lamas  Physician:Opal Ibarra NP  Date of eval:3/28/2017  Orders:  Physical Therapy evaluate and treat  Clinic: [id number]  Diagnosis:  1. Chronic bilateral low back pain without sciatica     2. Chronic bilateral thoracic back pain     3. Muscle weakness         Precautions: Metastatic cancer   Evaluation date: 3/28/2017  Visit # authorized: 1/20  Authorization period: 12-31-17  Plan of care expiration: 5-8-17  MD Follow up appt: none scheduled    Subjective     Chief complaint: back pain and in trunk  Onset of pain : January 25   Mechanism of onset :  At start of chemo, muscle spasm in L breast and chest region and now in back region staying in trunk, no leg symptoms    January 3 had surgery for removal of L kidney     Aggravating factors: getting up out of bed a little better since doing a little more stretching  Easing factors: moving a criss  Sleep is disturbed. 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 30 tablet 11    benazepril (LOTENSIN) 10 MG tablet Take 1 tablet (10 mg total) by mouth once  "daily. 90 tablet 3    blood pressure test kit-small Kit Take blood pressure twice daily.      cabozantinib 60 mg Tab Take 60 mg by mouth once daily. 30 tablet 3    cyproheptadine (PERIACTIN) 4 mg tablet Take 1 tablet (4 mg total) by mouth 3 (three) times daily as needed. 90 tablet 0    lactulose (CHRONULAC) 20 gram/30 mL Soln Take 45 mLs (30 g total) by mouth every 6 (six) hours as needed. Start every 3 hours until bowel movement, then q6 PRN. 500 mL 0    multivitamin (ONE DAILY MULTIVITAMIN) per tablet Take 1 tablet by mouth once daily.      naproxen sodium (ANAPROX DS) 550 MG tablet Take 1 tablet (550 mg total) by mouth 2 (two) times daily with meals. 60 tablet 6    ondansetron (ZOFRAN-ODT) 8 MG TbDL Take 1 tablet (8 mg total) by mouth every 12 (twelve) hours as needed (nausea/vomiting). 20 tablet 0    oxycodone-acetaminophen (PERCOCET) 5-325 mg per tablet Take 1 tablet by mouth every 4 (four) hours as needed for Pain. 41 tablet 0    polyethylene glycol (GLYCOLAX) 17 gram PwPk Take 17 g by mouth once daily. 30 packet 0    tramadol (ULTRAM) 50 mg tablet Take 1 tablet (50 mg total) by mouth every 6 (six) hours as needed for Pain. 30 tablet 0    valacyclovir (VALTREX) 500 MG tablet TAKE 1 TABLET (500 MG TOTAL) BY MOUTH ONCE DAILY. 30 tablet 12     No current facility-administered medications on file prior to visit.        Special tests: none related to back    Pain level with 0 being the lowest and 10 being the highest presently: none at rest  Pain level with 0 being the lowest and 10 being the highest at worst: severe with movement as with rolling in bed  Pain level with 0 being the lowest and 10 being the highest at best: none     Patient Goals: "be able to move in bed without pain"    Objective     Postural examination in standing:  - flattened lumbar lordosis  - forward head  - forward shoulders  - R hip high  - L shoulder high    Postural examination in sitting:   - flattened lumbar lordosis  - " forward head  - forward shoulders      Functional assessment:   - walking: FB posturing of trunk, shuffling, pt with stomach issues this AM  - sit to stand: min assist  - sit to supine: min assist       - supine to sit: min assist  - supine to prone: N/T    Pelvic positioning: AR R    Lumbar active range of motion in standing is: N/T           Flexibility testing:  - hamstrings:     90/90 test R 25 L 30                Muscle Strength deferred due to not feeling well.  Pt able to bridge 25%      Endurance is  poor.    Lumbar Special tests:  SLR neg    Palpation: no c/o     Joint mobility: N/T  Other: pt reports her skin at coccyx is getting raw and sore when lying on back      PT Evaluation Completed? No, due to not feeling well will do FOTO, further MMT and visual check of buttocks for skin breakdown  Discussed Plan of Care with patient: Yes      TREATMENT:  Therapeutic exercise: Nikia received therapeutic exercises to develop strength, stabilization and endurance; flexibility and range of motion for 15 minutes including:see HEP sheet.     Instructed pt in need to unload coccyx area and to avoid lying on back for prolonged periods and to use pillow between knees and to hug pillow for upper back when on back    Piriformis stretch x 10  Bridging x 10  Trunk rotation stretch x 10  Modified push/pull x 3    Pt. Education: Instructed pt. regarding:proper technique with all exercises. Pt. to demonstrate good understanding of the education provided. Nikia demonstrated good return demonstration of activities. No cultural, environmental, or spiritual barriers identified to treatment or learning.  Assessment   This is a 51 y.o. female referred to outpatient physical therapy and presents with a medical diagnosis of Metastatic renal cell carcinoma L, metastasis to lung, bone metastases s/p nephrectomy/physical deconditioning and PT diagnosis/findings of back pain with muscle spasms with pelvic dysfunction and muscle weakness  demonstrating limitations as described in the problem list. Patient was in agreement with set goals and plan of care. Pt was given a written HEP along with posture education, instruction on body mechanics, activity modification/avoidance, and core/lumbar/LE strengthening regimen. Pt. verbally understood instructions and demonstrated proper form/technique. Pt was advised to perform these exercises free of pain, and discontinue use if symptoms persist/worsen. Pt will benefit from physical therapy services in order to maximize pain free functional independence. Rehab potential is good.  History  Co-morbidities and personal factors that may impact the plan of care Examination  Body Structures and Functions, activity limitations and participation restrictions that may impact the plan of care Clinical Presentation   Decision Making/ Complexity Score   Co-morbidities: metastatic cancer poor nutrition                Personal Factors:    Body Regions: LB and extremities    Body Systems: musculoskeletal ROM strength          Activity limitations: walking, bed sitting      Participation Restrictions: ability to work         Evolving with changing characteristics  mod         Medical necessity is demonstrated by the following IMPAIRMENTS/PROBLEM LIST:  Decreased range of motion  Decreased strength  Pelvic dysfunction  Increased pain with movement in bed  Gait dysfunction  Increased pain with walking  Disturbed sleep    GOALS:   Short Term Goals:  3 weeks  Increase range of motion 25%  Increase strength 1/2 muscle grade  Improve postural awareness of pelvis to independently identify dysfunction with min assist from PT  Be able to perform HEP with minimal cueing required      Long Term Goals: 6 weeks  Increase range of motion to 75% to 100% full   Improve muscle strength 1 muscle grade  Improve and stabilize proper pelvic positioning  Restore ability to ambulate with normal pain free gait  Walking for ADL will be restored without  increased pain  Restore ability to move in bed without increased pain  Restore normal sleep habits without disturbances due to pain      Plan     Pt will be treated by physical therapy 1-3 times a week for 6 weeks to include: Therapeutic exercises to increase ROM, strength and stabilization; joint and soft tissue mobilization with manual therapy techniques to decrease muscle tightness, pain and improve joint mobility; neuromuscular re-education to improve balance, coordination, kinesthetic sense and proprioception, therapeutic activities to improve coordination, strength and function, therapeutic taping to decrease pain, provide support and improve function; modalities such as moist heat, ice,  and electrical stimulation to increase circulation, decrease pain and inflammation;  temporary orthotics will be considered and utilized as needed to further decrease pain in WB; aquatic physical therapy will be utilized as needed.  Pt may be seen by PTA to carry out plan of care as part of the Rehab team.    I certify the need for these services furnished under this plan of treatment and while under my care.    ____________________________________ Physician/Referring Practitioner                                Date of Signature

## 2017-03-29 ENCOUNTER — PATIENT MESSAGE (OUTPATIENT)
Dept: HEMATOLOGY/ONCOLOGY | Facility: CLINIC | Age: 52
End: 2017-03-29

## 2017-03-30 ENCOUNTER — PATIENT MESSAGE (OUTPATIENT)
Dept: HEMATOLOGY/ONCOLOGY | Facility: CLINIC | Age: 52
End: 2017-03-30

## 2017-03-31 ENCOUNTER — PATIENT MESSAGE (OUTPATIENT)
Dept: HEMATOLOGY/ONCOLOGY | Facility: CLINIC | Age: 52
End: 2017-03-31

## 2017-03-31 ENCOUNTER — HOSPITAL ENCOUNTER (INPATIENT)
Facility: HOSPITAL | Age: 52
LOS: 19 days | Discharge: HOSPICE | DRG: 356 | End: 2017-04-19
Attending: EMERGENCY MEDICINE | Admitting: SURGERY
Payer: COMMERCIAL

## 2017-03-31 DIAGNOSIS — R11.2 NAUSEA AND VOMITING: ICD-10-CM

## 2017-03-31 DIAGNOSIS — K59.00 CONSTIPATION, UNSPECIFIED CONSTIPATION TYPE: ICD-10-CM

## 2017-03-31 DIAGNOSIS — J81.1 PULMONARY EDEMA: ICD-10-CM

## 2017-03-31 DIAGNOSIS — Z46.59 ENCOUNTER FOR NASOGASTRIC (NG) TUBE PLACEMENT: ICD-10-CM

## 2017-03-31 DIAGNOSIS — R11.2 CHEMOTHERAPY INDUCED NAUSEA AND VOMITING: ICD-10-CM

## 2017-03-31 DIAGNOSIS — M54.6 CHRONIC BILATERAL THORACIC BACK PAIN: ICD-10-CM

## 2017-03-31 DIAGNOSIS — G89.29 CHRONIC BILATERAL THORACIC BACK PAIN: ICD-10-CM

## 2017-03-31 DIAGNOSIS — C64.9 METASTATIC RENAL CELL CARCINOMA, UNSPECIFIED LATERALITY: ICD-10-CM

## 2017-03-31 DIAGNOSIS — K56.609 SMALL BOWEL OBSTRUCTION: Primary | ICD-10-CM

## 2017-03-31 DIAGNOSIS — T45.1X5A CHEMOTHERAPY INDUCED NAUSEA AND VOMITING: ICD-10-CM

## 2017-03-31 DIAGNOSIS — C79.51 BONE METASTASES: ICD-10-CM

## 2017-03-31 DIAGNOSIS — R59.0 RETROPERITONEAL LYMPHADENOPATHY: ICD-10-CM

## 2017-03-31 DIAGNOSIS — M54.50 CHRONIC BILATERAL LOW BACK PAIN WITHOUT SCIATICA: ICD-10-CM

## 2017-03-31 DIAGNOSIS — M62.81 MUSCLE WEAKNESS: ICD-10-CM

## 2017-03-31 DIAGNOSIS — G89.29 CHRONIC BILATERAL LOW BACK PAIN WITHOUT SCIATICA: ICD-10-CM

## 2017-03-31 DIAGNOSIS — Z86.19 HISTORY OF HERPES GENITALIS: ICD-10-CM

## 2017-03-31 LAB
ALBUMIN SERPL BCP-MCNC: 2.4 G/DL
ALP SERPL-CCNC: 89 U/L
ALT SERPL W/O P-5'-P-CCNC: 25 U/L
AMORPH CRY UR QL COMP ASSIST: ABNORMAL
ANION GAP SERPL CALC-SCNC: 12 MMOL/L
AST SERPL-CCNC: 23 U/L
BACTERIA #/AREA URNS AUTO: ABNORMAL /HPF
BASOPHILS # BLD AUTO: 0.02 K/UL
BASOPHILS NFR BLD: 0.2 %
BILIRUB SERPL-MCNC: 0.4 MG/DL
BILIRUB UR QL STRIP: NEGATIVE
BUN SERPL-MCNC: 20 MG/DL
CALCIUM SERPL-MCNC: 9.4 MG/DL
CHLORIDE SERPL-SCNC: 94 MMOL/L
CLARITY UR REFRACT.AUTO: ABNORMAL
CO2 SERPL-SCNC: 30 MMOL/L
COLOR UR AUTO: YELLOW
CREAT SERPL-MCNC: 0.8 MG/DL
DIFFERENTIAL METHOD: ABNORMAL
EOSINOPHIL # BLD AUTO: 1.1 K/UL
EOSINOPHIL NFR BLD: 12.6 %
ERYTHROCYTE [DISTWIDTH] IN BLOOD BY AUTOMATED COUNT: 25.3 %
EST. GFR  (AFRICAN AMERICAN): >60 ML/MIN/1.73 M^2
EST. GFR  (NON AFRICAN AMERICAN): >60 ML/MIN/1.73 M^2
GLUCOSE SERPL-MCNC: 109 MG/DL
GLUCOSE UR QL STRIP: NEGATIVE
HCT VFR BLD AUTO: 37.8 %
HGB BLD-MCNC: 12.4 G/DL
HGB UR QL STRIP: NEGATIVE
HYALINE CASTS UR QL AUTO: 1 /LPF
INR PPP: 1.1
KETONES UR QL STRIP: NEGATIVE
LACTATE SERPL-SCNC: 0.9 MMOL/L
LEUKOCYTE ESTERASE UR QL STRIP: ABNORMAL
LIPASE SERPL-CCNC: 28 U/L
LYMPHOCYTES # BLD AUTO: 0.9 K/UL
LYMPHOCYTES NFR BLD: 10.8 %
MAGNESIUM SERPL-MCNC: 2.3 MG/DL
MCH RBC QN AUTO: 25.1 PG
MCHC RBC AUTO-ENTMCNC: 32.8 %
MCV RBC AUTO: 77 FL
MICROSCOPIC COMMENT: ABNORMAL
MONOCYTES # BLD AUTO: 0.5 K/UL
MONOCYTES NFR BLD: 5.3 %
NEUTROPHILS # BLD AUTO: 6.1 K/UL
NEUTROPHILS NFR BLD: 71 %
NITRITE UR QL STRIP: NEGATIVE
PH UR STRIP: 7 [PH] (ref 5–8)
PHOSPHATE SERPL-MCNC: 3.2 MG/DL
PLATELET # BLD AUTO: 387 K/UL
PMV BLD AUTO: 9.5 FL
POTASSIUM SERPL-SCNC: 4.3 MMOL/L
PROT SERPL-MCNC: 7.1 G/DL
PROT UR QL STRIP: NEGATIVE
PROTHROMBIN TIME: 11.3 SEC
RBC # BLD AUTO: 4.94 M/UL
RBC #/AREA URNS AUTO: 3 /HPF (ref 0–4)
SODIUM SERPL-SCNC: 136 MMOL/L
SP GR UR STRIP: 1.01 (ref 1–1.03)
SQUAMOUS #/AREA URNS AUTO: 4 /HPF
URN SPEC COLLECT METH UR: ABNORMAL
UROBILINOGEN UR STRIP-ACNC: NEGATIVE EU/DL
WBC # BLD AUTO: 8.62 K/UL
WBC #/AREA URNS AUTO: 9 /HPF (ref 0–5)

## 2017-03-31 PROCEDURE — 25000003 PHARM REV CODE 250: Performed by: EMERGENCY MEDICINE

## 2017-03-31 PROCEDURE — 83605 ASSAY OF LACTIC ACID: CPT

## 2017-03-31 PROCEDURE — 99285 EMERGENCY DEPT VISIT HI MDM: CPT | Mod: ,,, | Performed by: EMERGENCY MEDICINE

## 2017-03-31 PROCEDURE — 85025 COMPLETE CBC W/AUTO DIFF WBC: CPT

## 2017-03-31 PROCEDURE — 11000001 HC ACUTE MED/SURG PRIVATE ROOM

## 2017-03-31 PROCEDURE — 84100 ASSAY OF PHOSPHORUS: CPT

## 2017-03-31 PROCEDURE — 63600175 PHARM REV CODE 636 W HCPCS: Performed by: STUDENT IN AN ORGANIZED HEALTH CARE EDUCATION/TRAINING PROGRAM

## 2017-03-31 PROCEDURE — 99285 EMERGENCY DEPT VISIT HI MDM: CPT | Mod: 25

## 2017-03-31 PROCEDURE — 96361 HYDRATE IV INFUSION ADD-ON: CPT

## 2017-03-31 PROCEDURE — 63600175 PHARM REV CODE 636 W HCPCS: Performed by: EMERGENCY MEDICINE

## 2017-03-31 PROCEDURE — 25000003 PHARM REV CODE 250: Performed by: STUDENT IN AN ORGANIZED HEALTH CARE EDUCATION/TRAINING PROGRAM

## 2017-03-31 PROCEDURE — 81001 URINALYSIS AUTO W/SCOPE: CPT

## 2017-03-31 PROCEDURE — 83735 ASSAY OF MAGNESIUM: CPT

## 2017-03-31 PROCEDURE — 96376 TX/PRO/DX INJ SAME DRUG ADON: CPT

## 2017-03-31 PROCEDURE — 25500020 PHARM REV CODE 255: Performed by: EMERGENCY MEDICINE

## 2017-03-31 PROCEDURE — 99221 1ST HOSP IP/OBS SF/LOW 40: CPT | Mod: ,,, | Performed by: INTERNAL MEDICINE

## 2017-03-31 PROCEDURE — 96374 THER/PROPH/DIAG INJ IV PUSH: CPT

## 2017-03-31 PROCEDURE — 83690 ASSAY OF LIPASE: CPT

## 2017-03-31 PROCEDURE — 85610 PROTHROMBIN TIME: CPT

## 2017-03-31 PROCEDURE — 80053 COMPREHEN METABOLIC PANEL: CPT

## 2017-03-31 PROCEDURE — 96375 TX/PRO/DX INJ NEW DRUG ADDON: CPT

## 2017-03-31 PROCEDURE — 25000003 PHARM REV CODE 250

## 2017-03-31 RX ORDER — MORPHINE SULFATE 2 MG/ML
4 INJECTION, SOLUTION INTRAMUSCULAR; INTRAVENOUS
Status: COMPLETED | OUTPATIENT
Start: 2017-03-31 | End: 2017-03-31

## 2017-03-31 RX ORDER — PSEUDOEPHEDRINE/ACETAMINOPHEN 30MG-500MG
100 TABLET ORAL ONCE
Status: COMPLETED | OUTPATIENT
Start: 2017-03-31 | End: 2017-03-31

## 2017-03-31 RX ORDER — SYRING-NEEDL,DISP,INSUL,0.3 ML 29 G X1/2"
300 SYRINGE, EMPTY DISPOSABLE MISCELLANEOUS
Status: COMPLETED | OUTPATIENT
Start: 2017-03-31 | End: 2017-03-31

## 2017-03-31 RX ORDER — MORPHINE SULFATE 2 MG/ML
2 INJECTION, SOLUTION INTRAMUSCULAR; INTRAVENOUS EVERY 4 HOURS PRN
Status: DISCONTINUED | OUTPATIENT
Start: 2017-03-31 | End: 2017-04-02

## 2017-03-31 RX ORDER — SODIUM CHLORIDE 9 MG/ML
INJECTION, SOLUTION INTRAVENOUS CONTINUOUS
Status: DISCONTINUED | OUTPATIENT
Start: 2017-03-31 | End: 2017-04-09

## 2017-03-31 RX ORDER — SODIUM CHLORIDE 0.9 % (FLUSH) 0.9 %
3 SYRINGE (ML) INJECTION EVERY 8 HOURS
Status: DISCONTINUED | OUTPATIENT
Start: 2017-03-31 | End: 2017-04-19 | Stop reason: HOSPADM

## 2017-03-31 RX ORDER — MORPHINE SULFATE 2 MG/ML
4 INJECTION, SOLUTION INTRAMUSCULAR; INTRAVENOUS EVERY 4 HOURS PRN
Status: DISCONTINUED | OUTPATIENT
Start: 2017-03-31 | End: 2017-04-02

## 2017-03-31 RX ORDER — ENOXAPARIN SODIUM 100 MG/ML
40 INJECTION SUBCUTANEOUS
Status: DISCONTINUED | OUTPATIENT
Start: 2017-03-31 | End: 2017-04-18

## 2017-03-31 RX ORDER — ONDANSETRON 2 MG/ML
4 INJECTION INTRAMUSCULAR; INTRAVENOUS
Status: COMPLETED | OUTPATIENT
Start: 2017-03-31 | End: 2017-03-31

## 2017-03-31 RX ORDER — ONDANSETRON 2 MG/ML
4 INJECTION INTRAMUSCULAR; INTRAVENOUS ONCE
Status: COMPLETED | OUTPATIENT
Start: 2017-03-31 | End: 2017-03-31

## 2017-03-31 RX ORDER — HYOSCYAMINE SULFATE 0.12 MG/1
0.12 TABLET SUBLINGUAL
Status: COMPLETED | OUTPATIENT
Start: 2017-03-31 | End: 2017-03-31

## 2017-03-31 RX ORDER — ONDANSETRON 2 MG/ML
4 INJECTION INTRAMUSCULAR; INTRAVENOUS EVERY 12 HOURS PRN
Status: DISCONTINUED | OUTPATIENT
Start: 2017-03-31 | End: 2017-04-05

## 2017-03-31 RX ORDER — SIMETHICONE 80 MG
1 TABLET,CHEWABLE ORAL 3 TIMES DAILY
Status: DISCONTINUED | OUTPATIENT
Start: 2017-03-31 | End: 2017-04-05

## 2017-03-31 RX ADMIN — ONDANSETRON 4 MG: 2 INJECTION INTRAMUSCULAR; INTRAVENOUS at 07:03

## 2017-03-31 RX ADMIN — MORPHINE SULFATE 2 MG: 2 INJECTION, SOLUTION INTRAMUSCULAR; INTRAVENOUS at 11:03

## 2017-03-31 RX ADMIN — MORPHINE SULFATE 4 MG: 2 INJECTION, SOLUTION INTRAMUSCULAR; INTRAVENOUS at 02:03

## 2017-03-31 RX ADMIN — PROMETHAZINE HYDROCHLORIDE 6.25 MG: 25 INJECTION, SOLUTION INTRAMUSCULAR; INTRAVENOUS at 11:03

## 2017-03-31 RX ADMIN — SODIUM CHLORIDE, SODIUM LACTATE, POTASSIUM CHLORIDE, AND CALCIUM CHLORIDE 1000 ML: .6; .31; .03; .02 INJECTION, SOLUTION INTRAVENOUS at 08:03

## 2017-03-31 RX ADMIN — SODIUM CHLORIDE 500 ML: 0.9 INJECTION, SOLUTION INTRAVENOUS at 04:03

## 2017-03-31 RX ADMIN — ONDANSETRON 4 MG: 2 INJECTION INTRAMUSCULAR; INTRAVENOUS at 02:03

## 2017-03-31 RX ADMIN — Medication 3 ML: at 09:03

## 2017-03-31 RX ADMIN — MORPHINE SULFATE 4 MG: 2 INJECTION, SOLUTION INTRAMUSCULAR; INTRAVENOUS at 06:03

## 2017-03-31 RX ADMIN — SODIUM CHLORIDE, SODIUM LACTATE, POTASSIUM CHLORIDE, AND CALCIUM CHLORIDE 1000 ML: .6; .31; .03; .02 INJECTION, SOLUTION INTRAVENOUS at 07:03

## 2017-03-31 RX ADMIN — ENOXAPARIN SODIUM 40 MG: 100 INJECTION SUBCUTANEOUS at 05:03

## 2017-03-31 RX ADMIN — SIMETHICONE CHEW TAB 80 MG 80 MG: 80 TABLET ORAL at 05:03

## 2017-03-31 RX ADMIN — MAGESIUM CITRATE 300 ML: 1.75 LIQUID ORAL at 06:03

## 2017-03-31 RX ADMIN — IOHEXOL 75 ML: 350 INJECTION, SOLUTION INTRAVENOUS at 12:03

## 2017-03-31 RX ADMIN — Medication 100 ML: at 06:03

## 2017-03-31 RX ADMIN — Medication 1 CAPSULE: at 05:03

## 2017-03-31 RX ADMIN — SODIUM CHLORIDE: 0.9 INJECTION, SOLUTION INTRAVENOUS at 03:03

## 2017-03-31 RX ADMIN — HYOSCYAMINE SULFATE 0.12 MG: 0.12 TABLET ORAL at 09:03

## 2017-03-31 RX ADMIN — SODIUM CHLORIDE, SODIUM LACTATE, POTASSIUM CHLORIDE, AND CALCIUM CHLORIDE 1000 ML: .6; .31; .03; .02 INJECTION, SOLUTION INTRAVENOUS at 05:03

## 2017-03-31 RX ADMIN — MORPHINE SULFATE 4 MG: 2 INJECTION, SOLUTION INTRAMUSCULAR; INTRAVENOUS at 08:03

## 2017-03-31 RX ADMIN — ONDANSETRON 4 MG: 2 INJECTION INTRAMUSCULAR; INTRAVENOUS at 08:03

## 2017-03-31 RX ADMIN — SIMETHICONE CHEW TAB 80 MG 80 MG: 80 TABLET ORAL at 09:03

## 2017-03-31 NOTE — ASSESSMENT & PLAN NOTE
- recommend holding cabozantinib. Plan to restart only when patient's partial small bowel obstruction has resolved.  - I have also discussed the patient with her primary oncologist, Dr. Connelly. He is in agreement with this plan.  - she can follow up with Dr. Connelly in oncology clinic following discharge.

## 2017-03-31 NOTE — PROGRESS NOTES
Brown bomb enema given with very good result. Large amount of liquid, brown stool. Pt hooked back up to NGT.

## 2017-03-31 NOTE — ED TRIAGE NOTES
"Pt states she has been constipated and vomiting since last Thursday. Pt stated "I drank magnesium citrate on Thursday then an enema on Friday and Sunday. Then my doctor prescribed me lactulose and I just had water coming out." Pt states she did have a bowel movement after taking enema on Friday and Sunday but has not had a bowel movement since Monday. Pt also states she is unable to hold down food or liquids. Pt c/o right upper quadrant pain and right lower quadrant pain  "

## 2017-03-31 NOTE — ED NOTES
Dr. Kelley called to ask if NG tube was placed and if we ordered an X-ray for placement. Verified that we did place NG tube and placed the order for xray.

## 2017-03-31 NOTE — ED NOTES
Care assumed. Pt resting in stretcher with family at bedside and is in NAD at this time. Pt AAOx4, VSS, and respirations even and unlabored. Pt rates pain abdominal pain 4/10 at this time, states the pain has decreased slightly since pain medication was given approx 30 minutes ago - will continue to monitor. Pt and family updated on POC. Pt aware of need for urine specimen and states she will provide as soon as she can. Bed low and locked with side rails up x2, call bell in patient's reach.

## 2017-03-31 NOTE — CONSULTS
"[facility]  General Surgery  Consult Note    Inpatient consult to General Surgery  Consult performed by: EVELINA NUNES  Consult ordered by: ALYSSA LEE  Reason for consult: Partial/ full small bowel obstruction with concern for internal herniation at the left mid abdomen per consultation with radiology on CT abdomen with IV.  Patient with underlying history of metastatic renal CA        Subjective:     Chief Complaint/Reason for Admission: bowel obstruction    History of Present Illness: 52 yo W with a history of metastatic renal cell carcinoma s/p radical left nephrectomy on 1/30/17 and currently on cabozantinib (followed by Dr. Fong in Heme/Onc) since her surgery who presents with a history of intermittent abdominal pain for the past week and constipation. She states that she has tried taking mag citrate and suppositories which did not help and then took two enemas, the last one being on Sunday after which she had a good bowel movement. She tried taking lactulose but it "messed" her stomach up. Her last BM was on Tuesday. She is passing some flatus. She is nauseated and has had several bout of non-bloody and non-bilious emesis. This is the first time she is presenting with a problem as such.    No current facility-administered medications on file prior to encounter.      Current Outpatient Prescriptions on File Prior to Encounter   Medication Sig    amlodipine (NORVASC) 10 MG tablet Take 1 tablet (10 mg total) by mouth once daily.    benazepril (LOTENSIN) 10 MG tablet Take 1 tablet (10 mg total) by mouth once daily.    cabozantinib 60 mg Tab Take 60 mg by mouth once daily.    lactulose (CHRONULAC) 20 gram/30 mL Soln Take 45 mLs (30 g total) by mouth every 6 (six) hours as needed. Start every 3 hours until bowel movement, then q6 PRN.    ondansetron (ZOFRAN-ODT) 8 MG TbDL Take 1 tablet (8 mg total) by mouth every 12 (twelve) hours as needed (nausea/vomiting).    " oxycodone-acetaminophen (PERCOCET) 5-325 mg per tablet Take 1 tablet by mouth every 4 (four) hours as needed for Pain.    tramadol (ULTRAM) 50 mg tablet Take 1 tablet (50 mg total) by mouth every 6 (six) hours as needed for Pain.    blood pressure test kit-small Kit Take blood pressure twice daily.    cyproheptadine (PERIACTIN) 4 mg tablet Take 1 tablet (4 mg total) by mouth 3 (three) times daily as needed.    multivitamin (ONE DAILY MULTIVITAMIN) per tablet Take 1 tablet by mouth once daily.    naproxen sodium (ANAPROX DS) 550 MG tablet Take 1 tablet (550 mg total) by mouth 2 (two) times daily with meals.    polyethylene glycol (GLYCOLAX) 17 gram PwPk Take 17 g by mouth once daily.    valacyclovir (VALTREX) 500 MG tablet TAKE 1 TABLET (500 MG TOTAL) BY MOUTH ONCE DAILY.       Review of patient's allergies indicates:   Allergen Reactions    No known drug allergies        Past Medical History:   Diagnosis Date    Abnormal Pap smear of cervix 10/2015    + HR HPV    Anemia     hx chronic anemia, improved after ablation    Herpes simplex without mention of complication      Past Surgical History:   Procedure Laterality Date     SECTION  ,     DILATION AND CURETTAGE OF UTERUS      ENDOMETRIAL ABLATION  2014    Novasure endometrial ablation with D&C    laparoscopy for endometriosis      TUBAL LIGATION  2006    Laparoscopic     Family History     Problem Relation (Age of Onset)    Alzheimer's disease Maternal Grandmother    Hypertension Mother        Social History Main Topics    Smoking status: Never Smoker    Smokeless tobacco: Never Used    Alcohol use 0.6 oz/week     1 Glasses of wine per week      Comment: social    Drug use: No    Sexual activity: Not Currently     Partners: Male     Birth control/ protection: Surgical, None      Comment: No partner x 1 year     Review of Systems   Constitutional: Positive for appetite change. Negative for activity change, chills,  diaphoresis, fatigue and unexpected weight change.   Gastrointestinal: Positive for abdominal pain, constipation, nausea and vomiting.   Endocrine: Negative.    Genitourinary: Negative for difficulty urinating, dysuria, flank pain and frequency.   Musculoskeletal: Positive for back pain.   Skin: Negative for color change, pallor and rash.   Hematological: Negative for adenopathy.     Objective:     Vital Signs (Most Recent):  Temp: 98.7 °F (37.1 °C) (03/31/17 1305)  Pulse: 86 (03/31/17 1305)  Resp: 16 (03/31/17 1211)  BP: (!) 145/86 (03/31/17 1305)  SpO2: 96 % (03/31/17 1305) Vital Signs (24h Range):  Temp:  [97.7 °F (36.5 °C)-98.7 °F (37.1 °C)] 98.7 °F (37.1 °C)  Pulse:  [] 86  Resp:  [16-18] 16  SpO2:  [96 %-99 %] 96 %  BP: (117-147)/(68-89) 145/86     Weight: 40.8 kg (90 lb)  Body mass index is 15.45 kg/(m^2).    Physical Exam   Constitutional: She is oriented to person, place, and time. She appears well-developed.   Thin-appearing patient   HENT:   Head: Normocephalic and atraumatic.   Eyes: Pupils are equal, round, and reactive to light.   Neck: Normal range of motion. Neck supple. No tracheal deviation present.   Cardiovascular: Normal rate and regular rhythm.    Pulmonary/Chest: Effort normal and breath sounds normal.   Abdominal: Soft. She exhibits no distension. There is tenderness. There is no guarding.       Well-healed midline incision; TTP mostly in L mid-quadrant; no rebound or guarding; bowel sounds present in all 4 quradrants, no high-pitched sounds   Neurological: She is alert and oriented to person, place, and time.       Significant Labs:  CBC:   Recent Labs  Lab 03/31/17  0748   WBC 8.62   RBC 4.94   HGB 12.4   HCT 37.8   *   MCV 77*   MCH 25.1*   MCHC 32.8     CMP:   Recent Labs  Lab 03/31/17  0748      CALCIUM 9.4   ALBUMIN 2.4*   PROT 7.1      K 4.3   CO2 30*   CL 94*   BUN 20   CREATININE 0.8   ALKPHOS 89   ALT 25   AST 23   BILITOT 0.4     Lactic Acid:   Recent  Labs  Lab 03/31/17  0758   LACTATE 0.9       Significant Diagnostics:  CT: I have reviewed all pertinent results/findings within the past 24 hours.    Multiple loops of dilated small bowel, with evidence of clumping and distortion in the left mid abdomen.  This finding is concerning for a partial small bowel destruction, possibly secondary to  adhesions or internal herniation.    Scattered foci of small bowel wall thickening which may be secondary to edema, infection, or ischemia.    Generous amount of stool in colon, suggestive of constipation.    In this patient, status post left-sided nephrectomy for a large renal mass, there is evidence of interval progression of metastatic disease, with enlarged osseous lesions.    Pathologic compression fracture of the T10 vertebral body.    Interval increase of pulmonary nodules and groundglass opacification within the lower lung lobes, felt be secondary to an infectious or inflammatory process of the small airways, as well as progression of metastatic disease.    Assessment/Plan:   50 yo W with metastatic renal cell carcinoma s/p left radical nephrectomy currently on cabozantinib, presenting with pSBO    -Admit to General surgery  -NPO with IVF  -NGT placed in ER; will check placement on abdominal xray  -Serial abdominal exams  -Daily labs  -GI and DVT prophylaxis  -Heme/Onc consult for recommendations for chemotherapy  -Discussed with staff    Thank you for your consult. I will follow-up with patient. Please contact us if you have any additional questions.    Denis Kelley MD  General Surgery  [facility]    I have personally performed a detailed history and physical examination on this patient. My findings are summarized in the resident's note included in the record.

## 2017-03-31 NOTE — IP AVS SNAPSHOT
[facility]  [address]  [facility] [address]  Phone: [phone]           Patient Discharge Instructions   Our goal is to set you up for success. This packet includes information on your condition, medications, and your home care.  It will help you care for yourself to prevent having to return to the hospital.     Please ask your nurse if you have any questions.      There are many details to remember when preparing to leave the hospital. Here is what you will need to do:    1. Take your medicine. If you are prescribed medications, review your Medication List on the following pages. You may have new medications to  at the pharmacy and others that you'll need to stop taking. Review the instructions for how and when to take your medications. Talk with your doctor or nurses if you are unsure of what to do.     2. Go to your follow-up appointments. Specific follow-up information is listed in the following pages. Your may be contacted by a nurse or clinical provider about future appointments. Be sure we have all of the phone numbers to reach you. Please contact your provider's office if you are unable to make an appointment.     3. Watch for warning signs. Your doctor or nurse will give you detailed warning signs to watch for and when to call for assistance. These instructions may also include educational information about your condition. If you experience any of warning signs to your health, call your doctor.           [facility]  Unless otherwise directed by your provider, please   contact [facility], our nurse care line   that is available for 24/7 assistance.     [phone] (toll-free)     Registered nurses in the [facility]   provide: appointment scheduling, clinical advisement, health education, and other advisory services.                  ** Verify the list of medication(s) below is accurate and up to date. Carry this with you in case of  emergency. If your medications have changed, please notify your healthcare provider.             Medication List      START taking these medications        Additional Info                      fentaNYL 100 mcg/hr   Commonly known as:  DURAGESIC   Refills:  0   Dose:  1 patch    Last time this was given:  2 patches on 4/17/2017 11:45 AM   Instructions:  Place 1 patch onto the skin every 48 hours.     Begin Date    AM    Noon    PM    Bedtime       promethazine 25 MG suppository   Commonly known as:  PHENERGAN   Refills:  0   Dose:  25 mg    Instructions:  Place 1 suppository (25 mg total) rectally every 6 (six) hours as needed.     Begin Date    AM    Noon    PM    Bedtime       sodium chloride 0.9% 0.9 % injection   Refills:  0   Dose:  10 mL    Last time this was given:  3 mLs on 4/18/2017 10:00 PM   Instructions:  Inject 10 mLs into the vein as needed.     Begin Date    AM    Noon    PM    Bedtime         CONTINUE taking these medications        Additional Info                      lactulose 20 gram/30 mL Soln   Commonly known as:  CHRONULAC   Quantity:  500 mL   Refills:  0   Dose:  30 g    Instructions:  Take 45 mLs (30 g total) by mouth every 6 (six) hours as needed. Start every 3 hours until bowel movement, then q6 PRN.     Begin Date    AM    Noon    PM    Bedtime       ondansetron 8 MG Tbdl   Commonly known as:  ZOFRAN-ODT   Quantity:  20 tablet   Refills:  0   Dose:  8 mg    Instructions:  Take 1 tablet (8 mg total) by mouth every 12 (twelve) hours as needed (nausea/vomiting).     Begin Date    AM    Noon    PM    Bedtime       valacyclovir 500 MG tablet   Commonly known as:  VALTREX   Quantity:  30 tablet   Refills:  12    Instructions:  TAKE 1 TABLET (500 MG TOTAL) BY MOUTH ONCE DAILY.     Begin Date    AM    Noon    PM    Bedtime         STOP taking these medications     amlodipine 10 MG tablet   Commonly known as:  NORVASC       benazepril 10 MG tablet   Commonly known as:  LOTENSIN       blood pressure  test kit-small Kit       cabozantinib 60 mg Tab       cyproheptadine 4 mg tablet   Commonly known as:  PERIACTIN       ONE DAILY MULTIVITAMIN per tablet   Generic drug:  multivitamin       oxycodone-acetaminophen 5-325 mg per tablet   Commonly known as:  PERCOCET       polyethylene glycol 17 gram Pwpk   Commonly known as:  GLYCOLAX       tramadol 50 mg tablet   Commonly known as:  ULTRAM            Where to Get Your Medications      These medications were sent to [facility]/pharmacy #[id number] [place], [place] - [address] [facility]  [address] [address]     Phone:  [phone]     lactulose 20 gram/30 mL Soln         Information about where to get these medications is not yet available     ! Ask your nurse or doctor about these medications     fentaNYL 100 mcg/hr    ondansetron 8 MG Tbdl    promethazine 25 MG suppository    sodium chloride 0.9% 0.9 % injection    valacyclovir 500 MG tablet                  Please bring to all follow up appointments:    1. A copy of your discharge instructions.  2. All medicines you are currently taking in their original bottles.  3. Identification and insurance card.    Please arrive 15 minutes ahead of scheduled appointment time.    Please call 24 hours in advance if you must reschedule your appointment and/or time.        Follow-up Information     Follow up with Kermit Crawley MD.    Specialties:  General Surgery, Surgery    Contact information:    [address]  [facility] [address] [phone]          Discharge Instructions     Future Orders    Activity as tolerated     Diet general     Questions:    Total calories:      Fat restriction, if any:      Protein restriction, if any:      Na restriction, if any:      Fluid restriction:      Additional restrictions:          Primary Diagnosis     Your primary diagnosis was:  Small Bowel Obstruction      Admission Information     Date & Time Provider Department [facility]    3/31/2017  7:14 AM Jessica Patterson MD  "[facility] [id number]      Care Providers     Provider Role Specialty Primary office phone    Jessica Patterson MD Attending Provider Hematology and Oncology [phone]    Kermit Crawley MD Surgeon  General Surgery [phone]    Jessica Patterson MD Team Attending  Hematology and Oncology [phone]    [facility] Consulting Provider  Hospice and Palliative Medicine [phone]      Your Vitals Were     BP Pulse Temp Resp Height Weight    95/57 (BP Location: Left arm, Patient Position: Lying, BP Method: Automatic) 103 97.7 °F (36.5 °C) (Oral) 16 5' 4" (1.626 m) 40.8 kg (90 lb)    Last Period SpO2 BMI          01/03/2017 99% 15.45 kg/m2        Recent Lab Values        10/13/2011                           7:59 AM           A1C 5.6                       Pending Labs     Order Current Status    Blood culture Preliminary result    Prepare RBC 2 Units; Surgery Preliminary result      Allergies as of 4/19/2017        Reactions    No Known Drug Allergies       Advance Directives     An advance directive is a document which, in the event you are no longer able to make decisions for yourself, tells your healthcare team what kind of treatment you do or do not want to receive, or who you would like to make those decisions for you.  If you do not currently have an advance directive, [facility] encourages you to create one.  For more information call:  [phone]), [phone] ([phone]),  or log on to [email or web address].        Language Assistance Services     ATTENTION: Language assistance services are available, free of charge. Please call [phone].      ATENCIÓN: Si habla español, tiene a mcginnis disposición servicios gratuitos de asistencia lingüística. Llame al [phone].     CHÚ Ý: N?u b?n nói Ti?ng Vi?t, có các d?ch v? h? tr? ngôn ng? mi?n phí dành cho b?n. G?i s? [phone].        Blood Transfusion Reaction Signs and Symptoms     The blood you have received has " been matched for you as carefully as possible. Most patients who receive a blood transfusion do not experience problems. However, there can be a delayed reaction that happens a few weeks after your blood transfusion. Contact your physician immediately if you experience any NEW SYMPTOMS listed below:     Fever greater than 100.4 degrees    Chills   Yellow color to your skin or eyes(Jaundice)   Back pain, chest pain, or pain at the infusion site   Weakness (more than usual)   Discomfort or uneasiness more than usual (Malaise)   Nausea or vomiting   Shortness of breath, wheezing, or coughing   Higher or lower blood pressure than normal   Skin rash, itching, skin redness, or localized swelling (example: hands or feet)   Urinating less than normal   Urine appears reddish or orange and is darker than normal      Remember that some these signs may already exist for you--such as having chronic back pain or high blood pressure. You only need to look for and report to your doctor any new occurrences since your blood transfusion that are of concern.         [facility] complies with applicable Federal civil rights laws and does not discriminate on the basis of race, color, national origin, age, disability, or sex.

## 2017-03-31 NOTE — ED NOTES
Pt resting in stretcher with family at bedside and is no NAD at this time. Pt AAOx4, VSS, respirations even and unlabored. Pt and family updated on POC. Bed low and locked with side rails up x2, call bell in pt reach.

## 2017-03-31 NOTE — ED PROVIDER NOTES
Encounter Date: 3/31/2017    SCRIBE #1 NOTE: I, Alessandra Silva, am scribing for, and in the presence of,  Dr. Moran. I have scribed the entire note.       History     Chief Complaint   Patient presents with    Abdominal Pain     N/V, constipation.      Review of patient's allergies indicates:   Allergen Reactions    No known drug allergies      HPI Comments: Time patient was seen by the provider: 7:38 AM      The patient is a 51 y.o. female with hx of: anemia, kidney removal 3 months ago, and renal cell malignancy followed by Dr. Brumfield that presents to the ED with a complaint of constipation which began 8 days ago with oxycodone use associated with nausea, daily vomiting, and generalized weakness. Patient took magnesium citrate 8 days ago at time of onset per recommendation of her nurse practitioner without relief. 7 days ago as well as 6 days ago, she did an enema which provided some relief and resulted in bowel movements. 5 days ago patient started lactulose which she states caused worsened vomiting and abdominal discomfort. Patient has been unable to eat or keep down Ensures for the last 8 days and endorses recent weight loss. She does note a recent dry cough. Denies a history of CHF or MI.     The history is provided by the patient.     Past Medical History:   Diagnosis Date    Abnormal Pap smear of cervix 10/2015    + HR HPV    Anemia     hx chronic anemia, improved after ablation    Herpes simplex without mention of complication      Past Surgical History:   Procedure Laterality Date     SECTION  ,     DILATION AND CURETTAGE OF UTERUS      ENDOMETRIAL ABLATION  2014    Novasure endometrial ablation with D&C    laparoscopy for endometriosis      TUBAL LIGATION      Laparoscopic     Family History   Problem Relation Age of Onset    Alzheimer's disease Maternal Grandmother     Hypertension Mother     Breast cancer Neg Hx     Colon cancer Neg Hx     Ovarian cancer  Neg Hx     Diabetes Neg Hx     Stroke Neg Hx      Social History   Substance Use Topics    Smoking status: Never Smoker    Smokeless tobacco: Never Used    Alcohol use 0.6 oz/week     1 Glasses of wine per week      Comment: social     Review of Systems   Constitutional: Positive for appetite change (decreased) and unexpected weight change (recent weight loss).   HENT: Negative for congestion.    Eyes: Negative for visual disturbance.   Respiratory: Positive for cough (recent, dry).    Gastrointestinal: Positive for abdominal pain, constipation, nausea and vomiting.   Genitourinary: Negative for difficulty urinating.   Musculoskeletal: Negative for joint swelling.   Skin: Negative for wound.   Neurological: Positive for weakness (generalized).   Psychiatric/Behavioral: Negative for confusion.       Physical Exam   Initial Vitals   BP Pulse Resp Temp SpO2   03/31/17 0710 03/31/17 0710 03/31/17 0710 03/31/17 0710 03/31/17 0710   124/87 119 16 97.7 °F (36.5 °C) 96 %     Physical Exam    Vitals reviewed.  Constitutional: She appears well-developed and well-nourished. She is not diaphoretic. No distress.   51 year old  woman, frail appearing, mild discomfort noted    HENT:   Head: Normocephalic and atraumatic.   Anhydrous mucous membranes    Eyes: Pupils are equal, round, and reactive to light.   Neck: No tracheal deviation present.   Cardiovascular: Regular rhythm and intact distal pulses. Tachycardia present.    Pulmonary/Chest: Breath sounds normal. No stridor. No respiratory distress.   Abdominal: Soft. There is tenderness (diffuse). There is no rebound.   Midline scar clean, dry, and intact   Musculoskeletal: She exhibits no edema (peripheral).   Moving all four extremities   Neurological: She is alert and oriented to person, place, and time.   Psychiatric: Thought content normal.   Depressed affect, cooperative with exam         ED Course   Procedures  Labs Reviewed   CBC W/ AUTO DIFFERENTIAL -  Abnormal; Notable for the following:        Result Value    MCV 77 (*)     MCH 25.1 (*)     RDW 25.3 (*)     Platelets 387 (*)     Lymph # 0.9 (*)     Eos # 1.1 (*)     Lymph% 10.8 (*)     Eosinophil% 12.6 (*)     All other components within normal limits   COMPREHENSIVE METABOLIC PANEL - Abnormal; Notable for the following:     Chloride 94 (*)     CO2 30 (*)     Albumin 2.4 (*)     All other components within normal limits   URINALYSIS - Abnormal; Notable for the following:     Appearance, UA Hazy (*)     Leukocytes, UA 2+ (*)     All other components within normal limits   URINALYSIS MICROSCOPIC - Abnormal; Notable for the following:     WBC, UA 9 (*)     All other components within normal limits   PROTIME-INR   LACTIC ACID, PLASMA   LIPASE   MAGNESIUM   PHOSPHORUS        Imaging Results         CT Abdomen Pelvis With Contrast (Final result) Result time:  03/31/17 13:06:10    Final result by Thomas Miranad MD (03/31/17 13:06:10)    Impression:         Multiple loops of dilated small bowel, with evidence of clumping and distortion in the left mid abdomen.  This finding is concerning for a partial small bowel destruction, possibly secondary to  adhesions or internal herniation.    Scattered foci of small bowel wall thickening which may be secondary to edema, infection, or ischemia.    Generous amount of stool in colon, suggestive of constipation.    In this patient, status post left-sided nephrectomy for a large renal mass, there is evidence of interval progression of metastatic disease, with enlarged osseous lesions.    Pathologic compression fracture of the T10 vertebral body.    Interval increase of pulmonary nodules and groundglass opacification within the lower lung lobes, felt be secondary to an infectious or inflammatory process of the small airways, as well as progression of metastatic disease.    These findings were visualized at 12:20 and discussed with Dr. Moran at  12:30      ______________________________________     Electronically signed by resident: GUERA SANCHEZ MD  Date:     03/31/17  Time:    12:53            As the supervising and teaching physician, I personally reviewed the images and resident's interpretation and I agree with the findings.            Electronically signed by: NAHID DELEON MD  Date:     03/31/17  Time:    13:06     Narrative:    CT ABDOMEN, and, PELVIS with IV contrast    Protocol:  Routine.Axial images of the abdomen and pelvis were acquired  after the use of 75 cc [id number] IV contrast.  Coronal and sagittal reconstructions were also obtained    HISTORY:  51 year old F with Abdominal pain with air-fluid levels on plain film    COMPARISON: CT abdomen pelvis 12/19/2016 and CT chest the third 2017     FINDINGS:  Heart: Normal in size. No pericardial effusion.    Lung Bases: In this patient with innumerable previously identified pulmonary nodules, there has been an interval increase in presence of pulmonary nodules as well as scattered groundglass opacification within the lower lobes.  Given the short time course, the increased amount of pulmonary nodules and groundglass opacification is felt to be secondary to an infectious or inflammatory process of the small airways.  There is also likely progression of metastatic disease.    Liver: Normal in size and attenuation.There is a septated 1.8 cm cyst within hepatic segment SONDRA.      Gallbladder: No calcified gallstones.    Bile Ducts: No evidence of dilated ducts.    Pancreas: No mass or peripancreatic fat stranding.     Spleen: Normal.    Adrenals: Normal.    GI Tract/Mesentery: There is dilatation of the majority of small bowel with air and fluid levels.  There is clumping and distortion of the small bowel noted within the mid left quadrant.  This clumping is felt to be secondary to intraperitoneal adhesions or internal herniation. There is no definite twist to suggest volvulus or closed loop  obstruction. There is also scattered foci of small bowel wall thickening which may be secondary to edema, infection, or ischemia.  The colon demonstrates presence of a generous amount of retained stool, suggestive of constipation.    Kidneys/ Ureters: Postsurgical changes in keeping with a left sided nephrectomy,  in this patient with prior left-sided renal mass.  The right kidney demonstrates presence of multiple subcentimeter hypodensities, which are to small to characterize.No hydronephrosis or nephrolithiasis.No ureteral dilatation.    Bladder: No evidence of wall thickening.    Reproductive organs: Normal.     Retroperitoneum:  No significant adenopathy.      Peritoneal Space: Is a moderate amount of ascites, noted within the pelvis No free air.    Abdominal wall:  Normal.     Vasculature: No significant atherosclerosis or aneurysm. Portal vein is patent. All major branch vessels of the aorta are patent.     Bones: There is a pathologic compression fracture of the T10 vertebral body, resulting in approximately 50% vertebral body height loss.  There is a lytic lesion within the left pedicle of the T12 vertebrae.  There are 2 lytic lesions within the right iliac wing, and 2 within the left iliac wing, which appear larger in size than when compared to prior imaging.  There is also evidence of cortical breakthrough involving one of the lesions within the left iliac wing.  This finding measures approximately 2.9 cm in greatest dimension.            X-Ray Abdomen Flat And Erect (Final result) Result time:  03/31/17 11:00:24    Final result by Frank Tran III, MD (03/31/17 11:00:24)    Narrative:    2 views: There is mild dilatation of bowel with fluid levels. Early or partial SBO cannot be excluded.      Electronically signed by: FRANK TRAN  Date:     03/31/17  Time:    11:00                  Medical Decision Making:   History:   Old Medical Records: I decided to obtain old medical records.  Differential  Diagnosis:   My initial differential diagnoses include but are not limited to: intestinal obstruction, dehydration, narcotic abdominal syndrome, BILLY, electrolyte derangement   Clinical Tests:   Lab Tests: Ordered and Reviewed  Radiological Study: Ordered and Reviewed  Other:   I have discussed this case with another health care provider.            Scribe Attestation:   Scribe #1: I performed the above scribed service and the documentation accurately describes the services I performed. I attest to the accuracy of the note.    Attending Attestation:           Physician Attestation for Scribe:  Physician Attestation Statement for Scribe #1: I, Dr. Moran, reviewed documentation, as scribed by Alessandra Silva in my presence, and it is both accurate and complete.         Attending ED Notes:   Emergency department findings today reveal evidence of a partial to full small bowel obstruction identified on plain films and CT scan of the abdomen with IV contrast.  The patient has been urgently evaluated by both general surgery as well as medical oncology.  She will be admitted to the general surgery service with oncology providing recommendations in stable condition for further therapy and management.          ED Course     Clinical Impression:   The primary encounter diagnosis was Small bowel obstruction. Diagnoses of Nausea and vomiting and Encounter for nasogastric (NG) tube placement were also pertinent to this visit.    Disposition:   Disposition: Admitted  Condition: Stable  General surgery       Rolando Moran MD  03/31/17 [id number]

## 2017-03-31 NOTE — ED NOTES
Pt resting in stretcher with family at bedside and is in NAD at this time. Pt AAOx4, VSS, and respirations even and unlabored. Pt and family updated on POC. Bed low and locked with side rails up x2, call bell in patient's reach.

## 2017-03-31 NOTE — ED NOTES
LOC: The patient is awake, alert and aware of environment with an appropriate affect, the patient is oriented x 3 and speaking appropriately.  APPEARANCE: Patient resting comfortably and in no acute distress, patient is clean and well groomed, patient's clothing is properly fastened.  SKIN: The skin is warm and dry, patient has normal skin turgor and moist mucus membranes, skin intact, no breakdown or brusing noted.  MUSKULOSKELETAL: Patient moving all extremities well, no obvious swelling or deformities noted.  RESPIRATORY: Airway is open and patent, respirations are spontaneous, patient has a normal effort and rate. Breath sounds are clear and equal bilaterally.  CARDIAC: Normal heart sounds. No peripheral edema.  ABDOMEN: Soft; tenderness to palpation noted in RUQ and RLQ  NEURO: No neuro deficits, hand grasp equal, no drift noted, no facial droop noted. Speech is clear.

## 2017-03-31 NOTE — ED NOTES
Pt reports abdominal pain returning, rates pain 5/10 at this time, requests pain medication. Dr. Allie hoover.

## 2017-03-31 NOTE — CONSULTS
[facility]  Hematology/Oncology  Consult Note    Patient Name: Nikia Lamas  MRN: [id number]  Admission Date: 3/31/2017  Hospital Length of Stay: 0 days  Code Status: Full Code   Attending Provider: Kermit Crawley MD  Consulting Provider: Hari Munoz MD  Primary Care Physician: Joyce Parker NP  Principal Problem:Small bowel obstruction    Inpatient consult to Hematology/Oncology  Consult performed by: HARI MUNOZ  Consult ordered by: HARI MUNOZ  Reason for consult: metastatic renal cell carcinoma        Subjective:     HPI:  50 yo F with PMHx renal cell carcinoma with mets to liver and bone s/p L nephrectomy 01/2017 presents with 8 d constipation with associated nausea/vomiting intermittently for past 7 days.  She notes difficulty keeping down any food originally but had been using some Ensure supplements.  Had been doing daily PEG 17 g but decided to try a magnesium citrate due to claims to help with abdominal cramping.  She had no relief with this.  Tried lactulose with increase in abd pain and cramping and a couple limited watery bowel movements after using this.  Notes approx 4 lb weight loss over past week or so.  She has been on cabozantinib 60 mg po qd since 01/25/17 with no issues with bowel obstruction since starting chemo.  Has abdominal procedures in [facility] including recent L nephrectomy, 2 C sections, and tubal ligation.       Oncologic History (per Dr. Connelly's note dated 3/24/17):  Nikia Lamas is a 51 y.o. White female, referred by Dr. German, for management of metastatic renal cell carcinoma. Pt reports left mass was found incidentally while being worked up for a persistent dry cough that began in October 2016. She was evaluated with a CXR and a non-contrast chest CT.   12/14/16- CT of the chest without contrast showed multiple pulmonary nodes and a large left renal mass present. The L kidney was not fully evaluated as only the superior aspect was in  "the CT, but the mass was ~ 11 cm in diameter.   16- CT CAP reveals "Large heterogeneous left renal mass, consistent with renal cell carcinoma.  This mass does not appear to invade the renal vein or IVC.  Innumerable pulmonary nodules, retroperitoneal lymphadenopathy and osseous lytic lesions, concerning for metastatic disease. Indeterminate hepatic lesions, possibly metastatic disease."  1/3/17- Radical left nephrectomy      Oncology Treatment Plan:   [No treatment plan]    Medications:  Continuous Infusions:   sodium chloride 0.9%       Scheduled Meds:   enoxaparin  40 mg Subcutaneous Q24H    sodium chloride 0.9%  3 mL Intravenous Q8H     PRN Meds:morphine, morphine, ondansetron, promethazine (PHENERGAN) IVPB     Review of patient's allergies indicates:   Allergen Reactions    No known drug allergies         Past Medical History:   Diagnosis Date    Abnormal Pap smear of cervix 10/2015    + HR HPV    Anemia     hx chronic anemia, improved after ablation    Herpes simplex without mention of complication      Past Surgical History:   Procedure Laterality Date     SECTION  ,     DILATION AND CURETTAGE OF UTERUS      ENDOMETRIAL ABLATION  2014    Novasure endometrial ablation with D&C    laparoscopy for endometriosis      TUBAL LIGATION  2006    Laparoscopic     Family History     Problem Relation (Age of Onset)    Alzheimer's disease Maternal Grandmother    Hypertension Mother        Social History Main Topics    Smoking status: Never Smoker    Smokeless tobacco: Never Used    Alcohol use 0.6 oz/week     1 Glasses of wine per week      Comment: social    Drug use: No    Sexual activity: Not Currently     Partners: Male     Birth control/ protection: Surgical, None      Comment: No partner x 1 year       Review of Systems   Constitutional: Positive for appetite change and unexpected weight change. Negative for chills and fever.   HENT: Negative for congestion, rhinorrhea " and sore throat.    Respiratory: Negative for cough and shortness of breath.    Cardiovascular: Negative for chest pain, palpitations and leg swelling.   Gastrointestinal: Positive for abdominal pain, constipation, nausea and vomiting. Negative for abdominal distention and blood in stool.   Genitourinary: Negative for dysuria and hematuria.   Musculoskeletal: Negative for arthralgias and myalgias.   Skin: Negative for rash and wound.   Neurological: Positive for weakness. Negative for dizziness and numbness.   Hematological: Negative for adenopathy. Does not bruise/bleed easily.   Psychiatric/Behavioral: Negative for agitation and confusion.     Objective:     Vital Signs (Most Recent):  Temp: 98.7 °F (37.1 °C) (03/31/17 1305)  Pulse: 103 (03/31/17 1402)  Resp: 16 (03/31/17 1211)  BP: (!) 144/85 (03/31/17 1402)  SpO2: 97 % (03/31/17 1402) Vital Signs (24h Range):  Temp:  [97.7 °F (36.5 °C)-98.7 °F (37.1 °C)] 98.7 °F (37.1 °C)  Pulse:  [] 103  Resp:  [16-18] 16  SpO2:  [95 %-99 %] 97 %  BP: (117-147)/(68-89) 144/85     Weight: 40.8 kg (90 lb)  Body mass index is 15.45 kg/(m^2).  Body surface area is 1.36 meters squared.    No intake or output data in the 24 hours ending 03/31/17 1523    Physical Exam   General:  Well-developed, well-nourished, nad  HEENT:  NCAT, PERRL, EOMI, oropharyngeal membranes non-erythematous/without exudate  Neck:  Supple, no lad, no JVD  Resp:  Symmetric expansion, no increased wob, CTAB  CVS:  RRR, no m/r/g.  No LE edema.  Peripheral pulses 2+ (radial, dorsalis pedis)  GI:  Abd scaphoid, tympanitic to percussion, some guarding appreciated, tenderness to palpation diffusely with patient indicating most tenderness in the RLQ to R side.  Hypoactive bowel sounds.  Neuro:  CNs intact, symmetric.  Strength 5/5 throughout.  Reflexes 2+ throughout (biceps, brachioradialis, patellar).  Psych:  AAOx3.  Very pleasant, cooperative with exam.  Speech, thought content appropriate.    Significant  Labs:     Recent Labs  Lab 03/31/17  0748   WBC 8.62   RBC 4.94   HGB 12.4   HCT 37.8   *   MCV 77*   MCH 25.1*   MCHC 32.8       Recent Labs  Lab 03/31/17  0748   CALCIUM 9.4   PROT 7.1      K 4.3   CO2 30*   CL 94*   BUN 20   CREATININE 0.8   ALKPHOS 89   ALT 25   AST 23   BILITOT 0.4     Diagnostic Results:  CT abdomen/pelvis (3/31/17):  - Multiple loops of dilated small bowel, with evidence of clumping and distortion in the left mid abdomen. This finding is concerning for a partial small bowel destruction, possibly secondary to  adhesions or internal herniation.  - Scattered foci of small bowel wall thickening which may be secondary to edema, infection, or ischemia.  - Generous amount of stool in colon, suggestive of constipation.  - In this patient, status post left-sided nephrectomy for a large renal mass, there is evidence of interval progression of metastatic disease, with enlarged osseous lesions.  - Pathologic compression fracture of the T10 vertebral body.  - Interval increase of pulmonary nodules and groundglass opacification within the lower lung lobes, felt be secondary to an infectious or inflammatory process of the small airways, as well as progression of metastatic disease.    Assessment/Plan:     Metastatic renal cell carcinoma  - recommend holding cabozantinib. Plan to restart only when patient's partial small bowel obstruction has resolved.  - I have also discussed the patient with her primary oncologist, Dr. Connelly. He is in agreement with this plan.  - she can follow up with Dr. Connelly in oncology clinic following discharge.    Thank you for your consult. I will sign off. Please contact us if you have any additional questions.    Aki Munoz MD  Hematology/Oncology  [facility]

## 2017-03-31 NOTE — SUBJECTIVE & OBJECTIVE
Oncology Treatment Plan:   [No treatment plan]    Medications:  Continuous Infusions:   sodium chloride 0.9%       Scheduled Meds:   enoxaparin  40 mg Subcutaneous Q24H    sodium chloride 0.9%  3 mL Intravenous Q8H     PRN Meds:morphine, morphine, ondansetron, promethazine (PHENERGAN) IVPB     Review of patient's allergies indicates:   Allergen Reactions    No known drug allergies         Past Medical History:   Diagnosis Date    Abnormal Pap smear of cervix 10/2015    + HR HPV    Anemia     hx chronic anemia, improved after ablation    Herpes simplex without mention of complication      Past Surgical History:   Procedure Laterality Date     SECTION  ,     DILATION AND CURETTAGE OF UTERUS      ENDOMETRIAL ABLATION  2014    Novasure endometrial ablation with D&C    laparoscopy for endometriosis      TUBAL LIGATION  2006    Laparoscopic     Family History     Problem Relation (Age of Onset)    Alzheimer's disease Maternal Grandmother    Hypertension Mother        Social History Main Topics    Smoking status: Never Smoker    Smokeless tobacco: Never Used    Alcohol use 0.6 oz/week     1 Glasses of wine per week      Comment: social    Drug use: No    Sexual activity: Not Currently     Partners: Male     Birth control/ protection: Surgical, None      Comment: No partner x 1 year       Review of Systems   Constitutional: Positive for appetite change and unexpected weight change. Negative for chills and fever.   HENT: Negative for congestion, rhinorrhea and sore throat.    Respiratory: Negative for cough and shortness of breath.    Cardiovascular: Negative for chest pain, palpitations and leg swelling.   Gastrointestinal: Positive for abdominal pain, constipation, nausea and vomiting. Negative for abdominal distention and blood in stool.   Genitourinary: Negative for dysuria and hematuria.   Musculoskeletal: Negative for arthralgias and myalgias.   Skin: Negative for rash and  wound.   Neurological: Positive for weakness. Negative for dizziness and numbness.   Hematological: Negative for adenopathy. Does not bruise/bleed easily.   Psychiatric/Behavioral: Negative for agitation and confusion.     Objective:     Vital Signs (Most Recent):  Temp: 98.7 °F (37.1 °C) (03/31/17 1305)  Pulse: 103 (03/31/17 1402)  Resp: 16 (03/31/17 1211)  BP: (!) 144/85 (03/31/17 1402)  SpO2: 97 % (03/31/17 1402) Vital Signs (24h Range):  Temp:  [97.7 °F (36.5 °C)-98.7 °F (37.1 °C)] 98.7 °F (37.1 °C)  Pulse:  [] 103  Resp:  [16-18] 16  SpO2:  [95 %-99 %] 97 %  BP: (117-147)/(68-89) 144/85     Weight: 40.8 kg (90 lb)  Body mass index is 15.45 kg/(m^2).  Body surface area is 1.36 meters squared.    No intake or output data in the 24 hours ending 03/31/17 1523    Physical Exam   General:  Well-developed, well-nourished, nad  HEENT:  NCAT, PERRL, EOMI, oropharyngeal membranes non-erythematous/without exudate  Neck:  Supple, no lad, no JVD  Resp:  Symmetric expansion, no increased wob, CTAB  CVS:  RRR, no m/r/g.  No LE edema.  Peripheral pulses 2+ (radial, dorsalis pedis)  GI:  Abd scaphoid, tympanitic to percussion, some guarding appreciated, tenderness to palpation diffusely with patient indicating most tenderness in the RLQ to R side.  Hypoactive bowel sounds.  Neuro:  CNs intact, symmetric.  Strength 5/5 throughout.  Reflexes 2+ throughout (biceps, brachioradialis, patellar).  Psych:  AAOx3.  Very pleasant, cooperative with exam.  Speech, thought content appropriate.    Significant Labs:     Recent Labs  Lab 03/31/17  0748   WBC 8.62   RBC 4.94   HGB 12.4   HCT 37.8   *   MCV 77*   MCH 25.1*   MCHC 32.8       Recent Labs  Lab 03/31/17  0748   CALCIUM 9.4   PROT 7.1      K 4.3   CO2 30*   CL 94*   BUN 20   CREATININE 0.8   ALKPHOS 89   ALT 25   AST 23   BILITOT 0.4     Diagnostic Results:  CT abdomen/pelvis (3/31/17):  - Multiple loops of dilated small bowel, with evidence of clumping and  distortion in the left mid abdomen. This finding is concerning for a partial small bowel destruction, possibly secondary to  adhesions or internal herniation.  - Scattered foci of small bowel wall thickening which may be secondary to edema, infection, or ischemia.  - Generous amount of stool in colon, suggestive of constipation.  - In this patient, status post left-sided nephrectomy for a large renal mass, there is evidence of interval progression of metastatic disease, with enlarged osseous lesions.  - Pathologic compression fracture of the T10 vertebral body.  - Interval increase of pulmonary nodules and groundglass opacification within the lower lung lobes, felt be secondary to an infectious or inflammatory process of the small airways, as well as progression of metastatic disease.

## 2017-03-31 NOTE — PROGRESS NOTES
Pt arrived to room 502  from ED. VSS. IVF intact and infusing. NGT connected to LIWS. Pt currently rates pain at 0. Pt aware of PRN morphine if needed. Will admit pt and resume care. Call light within reach. Family at bedside.

## 2017-04-01 LAB
ANION GAP SERPL CALC-SCNC: 9 MMOL/L
BASOPHILS # BLD AUTO: 0.02 K/UL
BASOPHILS # BLD AUTO: 0.03 K/UL
BASOPHILS NFR BLD: 0.3 %
BASOPHILS NFR BLD: 0.4 %
BUN SERPL-MCNC: 14 MG/DL
CALCIUM SERPL-MCNC: 8.3 MG/DL
CHLORIDE SERPL-SCNC: 103 MMOL/L
CO2 SERPL-SCNC: 26 MMOL/L
CREAT SERPL-MCNC: 0.7 MG/DL
DIFFERENTIAL METHOD: ABNORMAL
DIFFERENTIAL METHOD: ABNORMAL
EOSINOPHIL # BLD AUTO: 0.5 K/UL
EOSINOPHIL # BLD AUTO: 0.8 K/UL
EOSINOPHIL NFR BLD: 10.9 %
EOSINOPHIL NFR BLD: 7.1 %
ERYTHROCYTE [DISTWIDTH] IN BLOOD BY AUTOMATED COUNT: 26.2 %
ERYTHROCYTE [DISTWIDTH] IN BLOOD BY AUTOMATED COUNT: 26.3 %
EST. GFR  (AFRICAN AMERICAN): >60 ML/MIN/1.73 M^2
EST. GFR  (NON AFRICAN AMERICAN): >60 ML/MIN/1.73 M^2
GLUCOSE SERPL-MCNC: 72 MG/DL
HCT VFR BLD AUTO: 34.1 %
HCT VFR BLD AUTO: 34.4 %
HGB BLD-MCNC: 10.6 G/DL
HGB BLD-MCNC: 10.7 G/DL
LYMPHOCYTES # BLD AUTO: 0.6 K/UL
LYMPHOCYTES # BLD AUTO: 0.9 K/UL
LYMPHOCYTES NFR BLD: 13.4 %
LYMPHOCYTES NFR BLD: 8.1 %
MAGNESIUM SERPL-MCNC: 1.9 MG/DL
MCH RBC QN AUTO: 24.8 PG
MCH RBC QN AUTO: 25 PG
MCHC RBC AUTO-ENTMCNC: 31.1 %
MCHC RBC AUTO-ENTMCNC: 31.1 %
MCV RBC AUTO: 80 FL
MCV RBC AUTO: 80 FL
MONOCYTES # BLD AUTO: 0.4 K/UL
MONOCYTES # BLD AUTO: 0.5 K/UL
MONOCYTES NFR BLD: 5.9 %
MONOCYTES NFR BLD: 7 %
NEUTROPHILS # BLD AUTO: 4.9 K/UL
NEUTROPHILS # BLD AUTO: 5.9 K/UL
NEUTROPHILS NFR BLD: 69.5 %
NEUTROPHILS NFR BLD: 77.3 %
PHOSPHATE SERPL-MCNC: 3.5 MG/DL
PLATELET # BLD AUTO: 338 K/UL
PLATELET # BLD AUTO: 370 K/UL
PMV BLD AUTO: 9.4 FL
PMV BLD AUTO: 9.6 FL
POTASSIUM SERPL-SCNC: 4.4 MMOL/L
RBC # BLD AUTO: 4.28 M/UL
RBC # BLD AUTO: 4.28 M/UL
SODIUM SERPL-SCNC: 138 MMOL/L
WBC # BLD AUTO: 7 K/UL
WBC # BLD AUTO: 7.62 K/UL

## 2017-04-01 PROCEDURE — 63600175 PHARM REV CODE 636 W HCPCS

## 2017-04-01 PROCEDURE — 84100 ASSAY OF PHOSPHORUS: CPT

## 2017-04-01 PROCEDURE — 80048 BASIC METABOLIC PNL TOTAL CA: CPT

## 2017-04-01 PROCEDURE — 11000001 HC ACUTE MED/SURG PRIVATE ROOM

## 2017-04-01 PROCEDURE — 85025 COMPLETE CBC W/AUTO DIFF WBC: CPT

## 2017-04-01 PROCEDURE — 83735 ASSAY OF MAGNESIUM: CPT

## 2017-04-01 PROCEDURE — 25000003 PHARM REV CODE 250: Performed by: STUDENT IN AN ORGANIZED HEALTH CARE EDUCATION/TRAINING PROGRAM

## 2017-04-01 PROCEDURE — 36415 COLL VENOUS BLD VENIPUNCTURE: CPT

## 2017-04-01 PROCEDURE — 63600175 PHARM REV CODE 636 W HCPCS: Performed by: STUDENT IN AN ORGANIZED HEALTH CARE EDUCATION/TRAINING PROGRAM

## 2017-04-01 PROCEDURE — 25000003 PHARM REV CODE 250

## 2017-04-01 RX ORDER — MAGNESIUM SULFATE HEPTAHYDRATE 40 MG/ML
2 INJECTION, SOLUTION INTRAVENOUS ONCE
Status: COMPLETED | OUTPATIENT
Start: 2017-04-01 | End: 2017-04-01

## 2017-04-01 RX ADMIN — Medication 1 CAPSULE: at 10:04

## 2017-04-01 RX ADMIN — SIMETHICONE CHEW TAB 80 MG 80 MG: 80 TABLET ORAL at 02:04

## 2017-04-01 RX ADMIN — ONDANSETRON 4 MG: 2 INJECTION INTRAMUSCULAR; INTRAVENOUS at 02:04

## 2017-04-01 RX ADMIN — Medication 3 ML: at 06:04

## 2017-04-01 RX ADMIN — MAGNESIUM SULFATE IN WATER 2 G: 40 INJECTION, SOLUTION INTRAVENOUS at 10:04

## 2017-04-01 RX ADMIN — SIMETHICONE CHEW TAB 80 MG 80 MG: 80 TABLET ORAL at 09:04

## 2017-04-01 RX ADMIN — ENOXAPARIN SODIUM 40 MG: 100 INJECTION SUBCUTANEOUS at 02:04

## 2017-04-01 RX ADMIN — Medication 3 ML: at 09:04

## 2017-04-01 RX ADMIN — MORPHINE SULFATE 2 MG: 2 INJECTION, SOLUTION INTRAMUSCULAR; INTRAVENOUS at 10:04

## 2017-04-01 RX ADMIN — MORPHINE SULFATE 2 MG: 2 INJECTION, SOLUTION INTRAMUSCULAR; INTRAVENOUS at 06:04

## 2017-04-01 RX ADMIN — SIMETHICONE CHEW TAB 80 MG 80 MG: 80 TABLET ORAL at 06:04

## 2017-04-01 RX ADMIN — MORPHINE SULFATE 2 MG: 2 INJECTION, SOLUTION INTRAMUSCULAR; INTRAVENOUS at 02:04

## 2017-04-01 RX ADMIN — MORPHINE SULFATE 2 MG: 2 INJECTION, SOLUTION INTRAMUSCULAR; INTRAVENOUS at 04:04

## 2017-04-01 RX ADMIN — Medication 3 ML: at 02:04

## 2017-04-01 NOTE — PLAN OF CARE
PT is AAOX3, no acute changes noted during shift, pt has been on bedrest and up to BSC and toilet. Pt has been encouraged to walk halls. VSS. No skin breakdown noted, No falls noted. Fall precautions remain Pain assessed. Pain controlled with meds, Pt resting comfortable in bed. Call light in reach. Will continue to monitor.

## 2017-04-01 NOTE — PROGRESS NOTES
Progress Note  General Surgery    Admit Date: 3/31/2017  Post-operative Day:    Hospital Day: 2    SUBJECTIVE:     Follow-up For:  * No surgery found *    Patient seen and examined at bedside  No acute issues overnight. Afebrile. VSS  Abdominal pain improving  (+) bowel function with brown bomb  NGT output minimal and clear  Making adequate urine    Scheduled Meds:   enoxaparin  40 mg Subcutaneous Q24H    Lactobacillus rhamnosus GG  1 capsule Oral Daily    simethicone  1 tablet Oral TID    sodium chloride 0.9%  3 mL Intravenous Q8H     Continuous Infusions:   sodium chloride 0.9% 125 mL/hr at 03/31/17 1554     PRN Meds:morphine, morphine, ondansetron, promethazine (PHENERGAN) IVPB    Review of patient's allergies indicates:   Allergen Reactions    No known drug allergies        Review of Systems  See prior notes for additional details not listed in HPI      OBJECTIVE:     Vital Signs (Most Recent)  Temp: 96.5 °F (35.8 °C) (04/01/17 0746)  Pulse: 92 (04/01/17 0746)  Resp: 14 (04/01/17 0746)  BP: 122/73 (04/01/17 0746)  SpO2: (!) 92 % (04/01/17 0746)    Vital Signs Range (Last 24H):  Temp:  [96.5 °F (35.8 °C)-98.7 °F (37.1 °C)]   Pulse:  []   Resp:  [14-18]   BP: (115-145)/(68-87)   SpO2:  [92 %-98 %]     I & O (Last 24H):  Intake/Output Summary (Last 24 hours) at 04/01/17 0942  Last data filed at 04/01/17 0330   Gross per 24 hour   Intake             1848 ml   Output              650 ml   Net             1198 ml       Physical Exam:  General: NAD, NGT with clear output  Neuro: aaox4, no obvious focal deficit   Respiratory: resps even unlabored  Cardiac: cap refill <2 sec, RRR  Abdomen: scaphoid abdomen, mildly tender, normal bowel sounds  Extremities: Warm dry and intact        Lab Results   Component Value Date    WBC 7.00 04/01/2017    HGB 10.6 (L) 04/01/2017    HCT 34.1 (L) 04/01/2017    MCV 80 (L) 04/01/2017     04/01/2017     Lab Results   Component Value Date    CREATININE 0.7 04/01/2017     BUN 14 04/01/2017     04/01/2017    K 4.4 04/01/2017     04/01/2017    CO2 26 04/01/2017    CALCIUM 8.3 (L) 04/01/2017    PHOS 3.5 04/01/2017    MG 1.9 04/01/2017         Diagnostic Results:  Labs: Reviewed    ASSESSMENT/PLAN:     Nikia Lamas is a 51 y.o. female with metastatic renal cell carcinoma s/p left radical nephrectomy currently on cabozantinib, presenting with pSBO    - Continue bologna protocol (lactobacillus, mag oxide)  - Clamp NGT, possible remove  - Continue NPO, awaiting complete return of bowel function  - Ambulate  - IVF  - IV pain control PRN  - OOBTC/Ambulate  - Strict I & O's  - DVT prophylaxis  - Holding cabozantinib per hematology recs  - Bowel regimen    Aki Millan MD   [facility]

## 2017-04-02 LAB
ANION GAP SERPL CALC-SCNC: 11 MMOL/L
BASOPHILS # BLD AUTO: 0.02 K/UL
BASOPHILS NFR BLD: 0.2 %
BUN SERPL-MCNC: 11 MG/DL
CALCIUM SERPL-MCNC: 7.6 MG/DL
CHLORIDE SERPL-SCNC: 103 MMOL/L
CO2 SERPL-SCNC: 21 MMOL/L
CREAT SERPL-MCNC: 0.6 MG/DL
DIFFERENTIAL METHOD: ABNORMAL
EOSINOPHIL # BLD AUTO: 0.9 K/UL
EOSINOPHIL NFR BLD: 10.7 %
ERYTHROCYTE [DISTWIDTH] IN BLOOD BY AUTOMATED COUNT: 25.4 %
EST. GFR  (AFRICAN AMERICAN): >60 ML/MIN/1.73 M^2
EST. GFR  (NON AFRICAN AMERICAN): >60 ML/MIN/1.73 M^2
GLUCOSE SERPL-MCNC: 77 MG/DL
HCT VFR BLD AUTO: 31.8 %
HGB BLD-MCNC: 10.4 G/DL
LYMPHOCYTES # BLD AUTO: 0.8 K/UL
LYMPHOCYTES NFR BLD: 9.3 %
MAGNESIUM SERPL-MCNC: 1.5 MG/DL
MCH RBC QN AUTO: 26.5 PG
MCHC RBC AUTO-ENTMCNC: 32.7 %
MCV RBC AUTO: 81 FL
MONOCYTES # BLD AUTO: 0.6 K/UL
MONOCYTES NFR BLD: 7.5 %
NEUTROPHILS # BLD AUTO: 6.1 K/UL
NEUTROPHILS NFR BLD: 72.2 %
PHOSPHATE SERPL-MCNC: 2.2 MG/DL
PLATELET # BLD AUTO: 343 K/UL
PMV BLD AUTO: 10 FL
POTASSIUM SERPL-SCNC: 4.4 MMOL/L
RBC # BLD AUTO: 3.93 M/UL
SODIUM SERPL-SCNC: 135 MMOL/L
WBC # BLD AUTO: 8.5 K/UL

## 2017-04-02 PROCEDURE — 84100 ASSAY OF PHOSPHORUS: CPT

## 2017-04-02 PROCEDURE — 36415 COLL VENOUS BLD VENIPUNCTURE: CPT

## 2017-04-02 PROCEDURE — 63600175 PHARM REV CODE 636 W HCPCS: Performed by: STUDENT IN AN ORGANIZED HEALTH CARE EDUCATION/TRAINING PROGRAM

## 2017-04-02 PROCEDURE — 83735 ASSAY OF MAGNESIUM: CPT

## 2017-04-02 PROCEDURE — 99223 1ST HOSP IP/OBS HIGH 75: CPT | Mod: ,,, | Performed by: SURGERY

## 2017-04-02 PROCEDURE — 63600175 PHARM REV CODE 636 W HCPCS

## 2017-04-02 PROCEDURE — 25000003 PHARM REV CODE 250: Performed by: STUDENT IN AN ORGANIZED HEALTH CARE EDUCATION/TRAINING PROGRAM

## 2017-04-02 PROCEDURE — 80048 BASIC METABOLIC PNL TOTAL CA: CPT

## 2017-04-02 PROCEDURE — 25000003 PHARM REV CODE 250

## 2017-04-02 PROCEDURE — 85025 COMPLETE CBC W/AUTO DIFF WBC: CPT

## 2017-04-02 PROCEDURE — 11000001 HC ACUTE MED/SURG PRIVATE ROOM

## 2017-04-02 RX ORDER — AMLODIPINE BESYLATE 10 MG/1
10 TABLET ORAL DAILY
Status: DISCONTINUED | OUTPATIENT
Start: 2017-04-02 | End: 2017-04-09

## 2017-04-02 RX ORDER — CELECOXIB 200 MG/1
200 CAPSULE ORAL 2 TIMES DAILY
Status: COMPLETED | OUTPATIENT
Start: 2017-04-02 | End: 2017-04-02

## 2017-04-02 RX ORDER — AMOXICILLIN 250 MG
2 CAPSULE ORAL 2 TIMES DAILY
Status: DISCONTINUED | OUTPATIENT
Start: 2017-04-02 | End: 2017-04-05

## 2017-04-02 RX ORDER — HYDROCODONE BITARTRATE AND ACETAMINOPHEN 5; 325 MG/1; MG/1
1 TABLET ORAL EVERY 6 HOURS PRN
Status: DISCONTINUED | OUTPATIENT
Start: 2017-04-02 | End: 2017-04-03

## 2017-04-02 RX ORDER — MAGNESIUM SULFATE HEPTAHYDRATE 40 MG/ML
2 INJECTION, SOLUTION INTRAVENOUS
Status: COMPLETED | OUTPATIENT
Start: 2017-04-02 | End: 2017-04-02

## 2017-04-02 RX ORDER — POLYETHYLENE GLYCOL 3350 17 G/17G
17 POWDER, FOR SOLUTION ORAL 2 TIMES DAILY
Status: DISCONTINUED | OUTPATIENT
Start: 2017-04-02 | End: 2017-04-05

## 2017-04-02 RX ADMIN — MAGNESIUM SULFATE IN WATER 2 G: 40 INJECTION, SOLUTION INTRAVENOUS at 09:04

## 2017-04-02 RX ADMIN — Medication 3 ML: at 05:04

## 2017-04-02 RX ADMIN — MORPHINE SULFATE 2 MG: 2 INJECTION, SOLUTION INTRAMUSCULAR; INTRAVENOUS at 02:04

## 2017-04-02 RX ADMIN — Medication 3 ML: at 01:04

## 2017-04-02 RX ADMIN — ONDANSETRON 4 MG: 2 INJECTION INTRAMUSCULAR; INTRAVENOUS at 06:04

## 2017-04-02 RX ADMIN — HYDROCODONE BITARTRATE AND ACETAMINOPHEN 1 TABLET: 5; 325 TABLET ORAL at 03:04

## 2017-04-02 RX ADMIN — SIMETHICONE CHEW TAB 80 MG 80 MG: 80 TABLET ORAL at 01:04

## 2017-04-02 RX ADMIN — HYDROCODONE BITARTRATE AND ACETAMINOPHEN 1 TABLET: 5; 325 TABLET ORAL at 10:04

## 2017-04-02 RX ADMIN — POLYETHYLENE GLYCOL 3350 17 G: 17 POWDER, FOR SOLUTION ORAL at 08:04

## 2017-04-02 RX ADMIN — CELECOXIB 200 MG: 200 CAPSULE ORAL at 08:04

## 2017-04-02 RX ADMIN — MAGNESIUM SULFATE IN WATER 2 G: 40 INJECTION, SOLUTION INTRAVENOUS at 12:04

## 2017-04-02 RX ADMIN — SIMETHICONE CHEW TAB 80 MG 80 MG: 80 TABLET ORAL at 10:04

## 2017-04-02 RX ADMIN — ENOXAPARIN SODIUM 40 MG: 100 INJECTION SUBCUTANEOUS at 03:04

## 2017-04-02 RX ADMIN — POLYETHYLENE GLYCOL 3350 17 G: 17 POWDER, FOR SOLUTION ORAL at 09:04

## 2017-04-02 RX ADMIN — STANDARDIZED SENNA CONCENTRATE AND DOCUSATE SODIUM 2 TABLET: 8.6; 5 TABLET, FILM COATED ORAL at 09:04

## 2017-04-02 RX ADMIN — MORPHINE SULFATE 4 MG: 2 INJECTION, SOLUTION INTRAMUSCULAR; INTRAVENOUS at 06:04

## 2017-04-02 RX ADMIN — Medication 3 ML: at 10:04

## 2017-04-02 RX ADMIN — Medication 1 CAPSULE: at 09:04

## 2017-04-02 RX ADMIN — HYDROCODONE BITARTRATE AND ACETAMINOPHEN 1 TABLET: 5; 325 TABLET ORAL at 09:04

## 2017-04-02 RX ADMIN — AMLODIPINE BESYLATE 10 MG: 10 TABLET ORAL at 09:04

## 2017-04-02 RX ADMIN — STANDARDIZED SENNA CONCENTRATE AND DOCUSATE SODIUM 2 TABLET: 8.6; 5 TABLET, FILM COATED ORAL at 08:04

## 2017-04-02 RX ADMIN — CELECOXIB 200 MG: 200 CAPSULE ORAL at 09:04

## 2017-04-02 RX ADMIN — SIMETHICONE CHEW TAB 80 MG 80 MG: 80 TABLET ORAL at 05:04

## 2017-04-02 NOTE — PLAN OF CARE
Problem: Patient Care Overview  Goal: Plan of Care Review  Outcome: Ongoing (interventions implemented as appropriate)  Plan of care reviewed with patient with no questions or concerns at this time. Pain was assessed and managed throughout shift. SCD's in place. IV intact with continuous fluids infusing. Patient remained free from falls during shift. Fall precautions in place. Will continue to monitor.

## 2017-04-02 NOTE — PROGRESS NOTES
Progress Note  General Surgery    Admit Date: 3/31/2017  Post-operative Day:    Hospital Day: 3    SUBJECTIVE:     Follow-up For:  * No surgery found *    Patient seen and examined at bedside  No acute issues overnight. Afebrile. VSS  + bowel movement; tolerating clears  Some intermittent abdominal pain, but improved overall    Scheduled Meds:   amlodipine  10 mg Oral Daily    enoxaparin  40 mg Subcutaneous Q24H    Lactobacillus rhamnosus GG  1 capsule Oral Daily    magnesium sulfate IVPB  2 g Intravenous Q2H    polyethylene glycol  17 g Oral BID    senna-docusate 8.6-50 mg  2 tablet Oral BID    simethicone  1 tablet Oral TID    sodium chloride 0.9%  3 mL Intravenous Q8H     Continuous Infusions:   sodium chloride 0.9% 125 mL/hr at 03/31/17 1554     PRN Meds:morphine, morphine, ondansetron, promethazine (PHENERGAN) IVPB    Review of patient's allergies indicates:   Allergen Reactions    No known drug allergies        Review of Systems  See prior notes for additional details not listed in HPI      OBJECTIVE:     Vital Signs (Most Recent)  Temp: 98.3 °F (36.8 °C) (04/02/17 0334)  Pulse: 96 (04/02/17 0334)  Resp: 17 (04/02/17 0334)  BP: 126/74 (04/02/17 0334)  SpO2: (!) 94 % (04/02/17 0334)    Vital Signs Range (Last 24H):  Temp:  [97.4 °F (36.3 °C)-98.5 °F (36.9 °C)]   Pulse:  [89-96]   Resp:  [14-17]   BP: (111-131)/(67-82)   SpO2:  [94 %-98 %]     I & O (Last 24H):    Intake/Output Summary (Last 24 hours) at 04/02/17 0902  Last data filed at 04/02/17 0334   Gross per 24 hour   Intake             3600 ml   Output                0 ml   Net             3600 ml       Physical Exam:  General: NAD, NGT with clear output  Neuro: aaox4, no obvious focal deficit   Respiratory: resps even unlabored  Cardiac: cap refill <2 sec, RRR  Abdomen: scaphoid abdomen, mildly tender, normal bowel sounds  Extremities: Warm dry and intact        Lab Results   Component Value Date    WBC 8.50 04/02/2017    HGB 10.4 (L) 04/02/2017     HCT 31.8 (L) 04/02/2017    MCV 81 (L) 04/02/2017     04/02/2017     Lab Results   Component Value Date    CREATININE 0.6 04/02/2017    BUN 11 04/02/2017     (L) 04/02/2017    K 4.4 04/02/2017     04/02/2017    CO2 21 (L) 04/02/2017    CALCIUM 7.6 (L) 04/02/2017    PHOS 2.2 (L) 04/02/2017    MG 1.5 (L) 04/02/2017         Diagnostic Results:  Labs: Reviewed    ASSESSMENT/PLAN:     Nikia Lamas is a 51 y.o. female with metastatic renal cell carcinoma s/p left radical nephrectomy currently on cabozantinib, presenting with pSBO    - Continue bologna protocol (lactobacillus, mag oxide)  - Clears for now; advance diet as tolerated  - Ambulate  - IVF  - PO pain control PRN  - Home amlodipine  - OOBTC/Ambulate  - Strict I & O's  - DVT prophylaxis  - Holding cabozantinib per hematology recs  - Colace/Miralax        Aki Millan MD   [facility]      I have personally performed a detailed history and physical examination on this patient. My findings are summarized in the resident's note included in the record.  Demonstrating clinical improvement

## 2017-04-03 LAB
ANION GAP SERPL CALC-SCNC: 7 MMOL/L
BASOPHILS # BLD AUTO: 0.01 K/UL
BASOPHILS NFR BLD: 0.2 %
BUN SERPL-MCNC: 11 MG/DL
CALCIUM SERPL-MCNC: 7.9 MG/DL
CHLORIDE SERPL-SCNC: 102 MMOL/L
CO2 SERPL-SCNC: 26 MMOL/L
CREAT SERPL-MCNC: 0.6 MG/DL
DIFFERENTIAL METHOD: ABNORMAL
EOSINOPHIL # BLD AUTO: 0.8 K/UL
EOSINOPHIL NFR BLD: 12.6 %
ERYTHROCYTE [DISTWIDTH] IN BLOOD BY AUTOMATED COUNT: 26 %
EST. GFR  (AFRICAN AMERICAN): >60 ML/MIN/1.73 M^2
EST. GFR  (NON AFRICAN AMERICAN): >60 ML/MIN/1.73 M^2
GLUCOSE SERPL-MCNC: 86 MG/DL
HCT VFR BLD AUTO: 33.5 %
HGB BLD-MCNC: 10.7 G/DL
LYMPHOCYTES # BLD AUTO: 0.8 K/UL
LYMPHOCYTES NFR BLD: 12.1 %
MAGNESIUM SERPL-MCNC: 2.1 MG/DL
MCH RBC QN AUTO: 24.7 PG
MCHC RBC AUTO-ENTMCNC: 31.9 %
MCV RBC AUTO: 77 FL
MONOCYTES # BLD AUTO: 0.4 K/UL
MONOCYTES NFR BLD: 5.8 %
NEUTROPHILS # BLD AUTO: 4.5 K/UL
NEUTROPHILS NFR BLD: 69.1 %
PHOSPHATE SERPL-MCNC: 2.3 MG/DL
PLATELET # BLD AUTO: 376 K/UL
PMV BLD AUTO: 9.7 FL
POTASSIUM SERPL-SCNC: 4.5 MMOL/L
RBC # BLD AUTO: 4.33 M/UL
SODIUM SERPL-SCNC: 135 MMOL/L
WBC # BLD AUTO: 6.52 K/UL

## 2017-04-03 PROCEDURE — 25000003 PHARM REV CODE 250: Performed by: STUDENT IN AN ORGANIZED HEALTH CARE EDUCATION/TRAINING PROGRAM

## 2017-04-03 PROCEDURE — 25000003 PHARM REV CODE 250

## 2017-04-03 PROCEDURE — 63600175 PHARM REV CODE 636 W HCPCS: Performed by: STUDENT IN AN ORGANIZED HEALTH CARE EDUCATION/TRAINING PROGRAM

## 2017-04-03 PROCEDURE — 63600175 PHARM REV CODE 636 W HCPCS: Performed by: PHYSICIAN ASSISTANT

## 2017-04-03 PROCEDURE — 80048 BASIC METABOLIC PNL TOTAL CA: CPT

## 2017-04-03 PROCEDURE — 25000003 PHARM REV CODE 250: Performed by: SURGERY

## 2017-04-03 PROCEDURE — C9113 INJ PANTOPRAZOLE SODIUM, VIA: HCPCS | Performed by: PHYSICIAN ASSISTANT

## 2017-04-03 PROCEDURE — 11000001 HC ACUTE MED/SURG PRIVATE ROOM

## 2017-04-03 PROCEDURE — 83735 ASSAY OF MAGNESIUM: CPT

## 2017-04-03 PROCEDURE — 36415 COLL VENOUS BLD VENIPUNCTURE: CPT

## 2017-04-03 PROCEDURE — 85025 COMPLETE CBC W/AUTO DIFF WBC: CPT

## 2017-04-03 PROCEDURE — 84100 ASSAY OF PHOSPHORUS: CPT

## 2017-04-03 RX ORDER — PANTOPRAZOLE SODIUM 40 MG/10ML
40 INJECTION, POWDER, LYOPHILIZED, FOR SOLUTION INTRAVENOUS DAILY
Status: DISCONTINUED | OUTPATIENT
Start: 2017-04-03 | End: 2017-04-19 | Stop reason: HOSPADM

## 2017-04-03 RX ORDER — OXYCODONE HYDROCHLORIDE 5 MG/1
10 TABLET ORAL EVERY 4 HOURS PRN
Status: DISCONTINUED | OUTPATIENT
Start: 2017-04-03 | End: 2017-04-05

## 2017-04-03 RX ORDER — ACETAMINOPHEN 325 MG/1
650 TABLET ORAL EVERY 8 HOURS
Status: DISCONTINUED | OUTPATIENT
Start: 2017-04-03 | End: 2017-04-08

## 2017-04-03 RX ORDER — MORPHINE SULFATE 2 MG/ML
2 INJECTION, SOLUTION INTRAMUSCULAR; INTRAVENOUS EVERY 4 HOURS PRN
Status: DISCONTINUED | OUTPATIENT
Start: 2017-04-03 | End: 2017-04-05

## 2017-04-03 RX ADMIN — PANTOPRAZOLE SODIUM 40 MG: 40 INJECTION, POWDER, FOR SOLUTION INTRAVENOUS at 02:04

## 2017-04-03 RX ADMIN — Medication 1 CAPSULE: at 09:04

## 2017-04-03 RX ADMIN — SIMETHICONE CHEW TAB 80 MG 80 MG: 80 TABLET ORAL at 02:04

## 2017-04-03 RX ADMIN — OXYCODONE HYDROCHLORIDE 10 MG: 5 TABLET ORAL at 08:04

## 2017-04-03 RX ADMIN — PROMETHAZINE HYDROCHLORIDE 6.25 MG: 25 INJECTION, SOLUTION INTRAMUSCULAR; INTRAVENOUS at 10:04

## 2017-04-03 RX ADMIN — AMLODIPINE BESYLATE 10 MG: 10 TABLET ORAL at 09:04

## 2017-04-03 RX ADMIN — HYDROCODONE BITARTRATE AND ACETAMINOPHEN 1 TABLET: 5; 325 TABLET ORAL at 04:04

## 2017-04-03 RX ADMIN — HYDROCODONE BITARTRATE AND ACETAMINOPHEN 1 TABLET: 5; 325 TABLET ORAL at 02:04

## 2017-04-03 RX ADMIN — ENOXAPARIN SODIUM 40 MG: 100 INJECTION SUBCUTANEOUS at 03:04

## 2017-04-03 RX ADMIN — Medication 3 ML: at 02:04

## 2017-04-03 RX ADMIN — POLYETHYLENE GLYCOL 3350 17 G: 17 POWDER, FOR SOLUTION ORAL at 09:04

## 2017-04-03 RX ADMIN — STANDARDIZED SENNA CONCENTRATE AND DOCUSATE SODIUM 2 TABLET: 8.6; 5 TABLET, FILM COATED ORAL at 09:04

## 2017-04-03 RX ADMIN — MORPHINE SULFATE 2 MG: 2 INJECTION, SOLUTION INTRAMUSCULAR; INTRAVENOUS at 05:04

## 2017-04-03 RX ADMIN — SIMETHICONE CHEW TAB 80 MG 80 MG: 80 TABLET ORAL at 10:04

## 2017-04-03 RX ADMIN — Medication 3 ML: at 10:04

## 2017-04-03 RX ADMIN — SODIUM PHOSPHATE, MONOBASIC, MONOHYDRATE 30 MMOL: 276; 142 INJECTION, SOLUTION INTRAVENOUS at 09:04

## 2017-04-03 RX ADMIN — ACETAMINOPHEN 650 MG: 325 TABLET ORAL at 10:04

## 2017-04-03 RX ADMIN — SIMETHICONE CHEW TAB 80 MG 80 MG: 80 TABLET ORAL at 06:04

## 2017-04-03 RX ADMIN — PROMETHAZINE HYDROCHLORIDE 6.25 MG: 25 INJECTION, SOLUTION INTRAMUSCULAR; INTRAVENOUS at 01:04

## 2017-04-03 RX ADMIN — Medication 3 ML: at 06:04

## 2017-04-03 RX ADMIN — ONDANSETRON 4 MG: 2 INJECTION INTRAMUSCULAR; INTRAVENOUS at 05:04

## 2017-04-03 NOTE — PHYSICIAN QUERY
PT Name: Nikia Lamas  MR #: [id number]     Physician Query Form - Documentation Clarification      CDS/: Farheen Carlisle RN CCDS              Contact information: [email or web address]    This form is a permanent document in the medical record.     Query Date: April 3, 2017    By submitting this query, we are merely seeking further clarification of documentation. Please utilize your independent clinical judgment when addressing the question(s) below.    The Medical record reflects the following:    Supporting Clinical Findings Location in Medical Record     Mg 1.5       4/2 lab     Magnesium Sulfate 2gm IVPB       4/2 mar                                                                            Doctor, Please specify diagnosis or diagnoses associated with above clinical findings.      ( x   )  Hypomagnesemia  (    )  Other      Provider Use Only                                                                                                                     [  ] Clinically undetermined

## 2017-04-03 NOTE — PLAN OF CARE
Patient lives alone in a 2nd floor apartment w/1 flights stairs to entrance. Parents at BS. Not medically stable for discharge;DX: SBO, IVF, KUB ordered today. CM will continue to follow.     [facility] given to patient after informed about it;patient verbalized their understanding.       04/03/17 1235   Discharge Assessment   Assessment Type Discharge Planning Assessment   Confirmed/corrected address and phone number on facesheet? Yes   Assessment information obtained from? Patient;Medical Record   Expected Length of Stay (days) 5   Communicated expected length of stay with patient/caregiver no  (Per MD)   Type of Healthcare Directive Received (Unknown)   Prior to hospitilization cognitive status: Alert/Oriented;No Deficits   Prior to hospitalization functional status: Independent   Current cognitive status: Alert/Oriented;No Deficits   Current Functional Status: Independent;Needs Assistance   Arrived From home or self-care  (Via ER)   Lives With alone   Able to Return to Prior Arrangements yes   Is patient able to care for self after discharge? Yes   How many people do you have in your home that can help with your care after discharge? 3   Who are your caregiver(s) and their phone number(s)? (Does not live w/her;3 family members available as needs: 1. Daughter: Alvaro BOOKER [phone], 2. Mother: no # given, 3. Father: no # given. )   Patient's perception of discharge disposition home or selfcare   Readmission Within The Last 30 Days no previous admission in last 30 days   Patient currently being followed by outpatient case management? No   Patient currently receives home health services? No   Does the patient currently use HME? No   Patient currently receives private duty nursing? N/A   Patient currently receives any other outside agency services? No   Equipment Currently Used at Home none   Do you have any problems affording any of your prescribed medications? No   Is the patient taking medications as  prescribed? yes   Do you have any financial concerns preventing you from receiving the healthcare you need? No   Does the patient have transportation to healthcare appointments? Yes   Transportation Available car;family or friend will provide   On Dialysis? No   Does the patient receive services at the Coumadin Clinic? No   Are there any open cases? No   Discharge Plan A Home  (family available as needs)   Discharge Plan B Home;Home Health  (Needs TBD/?HH/Family available as needs. )   Patient/Family In Agreement With Plan unable to assess

## 2017-04-03 NOTE — PHYSICIAN QUERY
PT Name: Nikia Lamas  MR #: [id number]     Physician Query Form - Documentation Clarification      CDS/: Farheen Carlisle RN CCDS             Contact information: [email or web address]    This form is a permanent document in the medical record.     Query Date: April 3, 2017    By submitting this query, we are merely seeking further clarification of documentation. Please utilize your independent clinical judgment when addressing the question(s) below.    The Medical record reflects the following:    Supporting Clinical Findings Location in Medical Record     metastatic renal cell carcinoma   s/p left radical nephrectomy currently on cabozantinib     3/31 pn, 4/3 pn     There is a pathologic compression fracture of the T10 vertebral body  There is a lytic lesion within the left pedicle of the T12 vertebrae.  There are 2 lytic lesions within the right iliac wing, and 2 within the left iliac wing, which appear larger in size    innumerable previously identified pulmonary nodules, there has been an interval increase in presence of pulmonary nodules as well as scattered groundglass opacification   There is also likely progression of metastatic disease   3/31 ct                                                                             Doctor, Please specify diagnosis or diagnoses associated with above clinical findings.  Please specify metastatic sites if known.     Provider Use Only    Stage IV renal cell cancer                                                                                                                   [  ] Clinically undetermined

## 2017-04-03 NOTE — PROGRESS NOTES
Progress Note  General Surgery    Admit Date: 3/31/2017  Post-operative Day:    Hospital Day: 4    SUBJECTIVE:     Follow-up For:  * No surgery found *    Patient seen and examined at bedside  Tolerating liquids; feels bloated and some nausea; some flatus, no further BM    Scheduled Meds:   amlodipine  10 mg Oral Daily    enoxaparin  40 mg Subcutaneous Q24H    Lactobacillus rhamnosus GG  1 capsule Oral Daily    polyethylene glycol  17 g Oral BID    senna-docusate 8.6-50 mg  2 tablet Oral BID    simethicone  1 tablet Oral TID    sodium chloride 0.9%  3 mL Intravenous Q8H     Continuous Infusions:   sodium chloride 0.9% 50 mL/hr at 04/02/17 0906     PRN Meds:hydrocodone-acetaminophen 5-325mg, ondansetron, promethazine (PHENERGAN) IVPB    Review of patient's allergies indicates:   Allergen Reactions    No known drug allergies        Review of Systems  See prior notes for additional details not listed in HPI      OBJECTIVE:     Vital Signs (Most Recent)  Temp: 98.2 °F (36.8 °C) (04/03/17 0400)  Pulse: 98 (04/03/17 0400)  Resp: 16 (04/03/17 0400)  BP: (!) 144/88 (04/03/17 0400)  SpO2: (!) 92 % (04/03/17 0400)    Vital Signs Range (Last 24H):  Temp:  [98.2 °F (36.8 °C)-98.7 °F (37.1 °C)]   Pulse:  []   Resp:  [14-16]   BP: (109-144)/(72-88)   SpO2:  [92 %-98 %]     I & O (Last 24H):    Intake/Output Summary (Last 24 hours) at 04/03/17 0822  Last data filed at 04/02/17 1300   Gross per 24 hour   Intake              980 ml   Output                0 ml   Net              980 ml       Physical Exam:  General: NAD, NGT with clear output  Neuro: aaox4, no obvious focal deficit   Respiratory: resps even unlabored  Cardiac: cap refill <2 sec, RRR  Abdomen: scaphoid abdomen, mildly tender, normal bowel sounds  Extremities: Warm dry and intact        Lab Results   Component Value Date    WBC 6.52 04/03/2017    HGB 10.7 (L) 04/03/2017    HCT 33.5 (L) 04/03/2017    MCV 77 (L) 04/03/2017     (H) 04/03/2017      Lab Results   Component Value Date    CREATININE 0.6 04/03/2017    BUN 11 04/03/2017     (L) 04/03/2017    K 4.5 04/03/2017     04/03/2017    CO2 26 04/03/2017    CALCIUM 7.9 (L) 04/03/2017    PHOS 2.3 (L) 04/03/2017    MG 2.1 04/03/2017         Diagnostic Results:  Labs: Reviewed    ASSESSMENT/PLAN:     Nikia Lamas is a 51 y.o. female with metastatic renal cell carcinoma s/p left radical nephrectomy currently on cabozantinib, presenting with pSBO    - Continue bologna protocol (lactobacillus, mag oxide)  - Cont clears for now  - Ambulate  - IVF  - PO pain control PRN  - Home amlodipine  - OOBTC/Ambulate  - Strict I & O's  - DVT prophylaxis  - Holding cabozantinib per hematology recs  - Colace/Miralax  -kub  -cont to await full bowel function    Tyrone Parra PGY5        I have personally performed a detailed history and physical examination on this patient. My findings are summarized in the resident's note included in the record.  If not fully improved tomorrow  WIll rescan - if negative will ask Hem Onc to take over care of patient

## 2017-04-03 NOTE — PLAN OF CARE
Problem: Patient Care Overview  Goal: Plan of Care Review  Outcome: Outcome(s) achieved Date Met:  04/03/17  Patient progressing with POC. Pain controlled with PRN medication. Vital signs stable. Afebrile. Safety and fall precautions active. Call light in reach. Will continue to monitor per frequent rounding.

## 2017-04-03 NOTE — PHYSICIAN QUERY
PT Name: Nikia Lamas  MR #: [id number]     Physician Query Form - Documentation Clarification      CDS/: Farheen Carlisle RN CCDS           Contact information: [email or web address]    This form is a permanent document in the medical record.     Query Date: April 3, 2017    By submitting this query, we are merely seeking further clarification of documentation. Please utilize your independent clinical judgment when addressing the question(s) below.    The Medical record reflects the following:    Supporting Clinical Findings Location in Medical Record     Phos 2.2-->2.3       4/2. 4/3 lab     Sodium Phosphate 30 mmol IVPB        4/3 mar                                                                            Doctor, Please specify diagnosis or diagnoses associated with above clinical findings.    ( x   )  Hypophosphatemia  (    )  Other___________)    Provider Use Only                                                                                                                 [  ] Clinically undetermined

## 2017-04-04 LAB
ANION GAP SERPL CALC-SCNC: 9 MMOL/L
BASOPHILS # BLD AUTO: 0.01 K/UL
BASOPHILS NFR BLD: 0.2 %
BUN SERPL-MCNC: 12 MG/DL
CALCIUM SERPL-MCNC: 7.9 MG/DL
CHLORIDE SERPL-SCNC: 102 MMOL/L
CO2 SERPL-SCNC: 27 MMOL/L
CREAT SERPL-MCNC: 0.6 MG/DL
DIFFERENTIAL METHOD: ABNORMAL
EOSINOPHIL # BLD AUTO: 0.7 K/UL
EOSINOPHIL NFR BLD: 16 %
ERYTHROCYTE [DISTWIDTH] IN BLOOD BY AUTOMATED COUNT: 26.3 %
EST. GFR  (AFRICAN AMERICAN): >60 ML/MIN/1.73 M^2
EST. GFR  (NON AFRICAN AMERICAN): >60 ML/MIN/1.73 M^2
GLUCOSE SERPL-MCNC: 75 MG/DL
HCT VFR BLD AUTO: 31.3 %
HGB BLD-MCNC: 9.8 G/DL
LYMPHOCYTES # BLD AUTO: 0.8 K/UL
LYMPHOCYTES NFR BLD: 18.7 %
MAGNESIUM SERPL-MCNC: 1.7 MG/DL
MCH RBC QN AUTO: 24.7 PG
MCHC RBC AUTO-ENTMCNC: 31.3 %
MCV RBC AUTO: 79 FL
MONOCYTES # BLD AUTO: 0.2 K/UL
MONOCYTES NFR BLD: 4.4 %
NEUTROPHILS # BLD AUTO: 2.5 K/UL
NEUTROPHILS NFR BLD: 60.7 %
PHOSPHATE SERPL-MCNC: 3 MG/DL
PLATELET # BLD AUTO: 394 K/UL
PMV BLD AUTO: 9.9 FL
POTASSIUM SERPL-SCNC: 4.6 MMOL/L
RBC # BLD AUTO: 3.96 M/UL
SODIUM SERPL-SCNC: 138 MMOL/L
WBC # BLD AUTO: 4.12 K/UL

## 2017-04-04 PROCEDURE — 25000003 PHARM REV CODE 250: Performed by: STUDENT IN AN ORGANIZED HEALTH CARE EDUCATION/TRAINING PROGRAM

## 2017-04-04 PROCEDURE — 25500020 PHARM REV CODE 255: Performed by: SURGERY

## 2017-04-04 PROCEDURE — 84100 ASSAY OF PHOSPHORUS: CPT

## 2017-04-04 PROCEDURE — 80048 BASIC METABOLIC PNL TOTAL CA: CPT

## 2017-04-04 PROCEDURE — 83735 ASSAY OF MAGNESIUM: CPT

## 2017-04-04 PROCEDURE — 36415 COLL VENOUS BLD VENIPUNCTURE: CPT

## 2017-04-04 PROCEDURE — 63600175 PHARM REV CODE 636 W HCPCS: Performed by: STUDENT IN AN ORGANIZED HEALTH CARE EDUCATION/TRAINING PROGRAM

## 2017-04-04 PROCEDURE — 11000001 HC ACUTE MED/SURG PRIVATE ROOM

## 2017-04-04 PROCEDURE — 25500020 PHARM REV CODE 255: Performed by: STUDENT IN AN ORGANIZED HEALTH CARE EDUCATION/TRAINING PROGRAM

## 2017-04-04 PROCEDURE — 25000003 PHARM REV CODE 250

## 2017-04-04 PROCEDURE — 63600175 PHARM REV CODE 636 W HCPCS: Performed by: PHYSICIAN ASSISTANT

## 2017-04-04 PROCEDURE — C9113 INJ PANTOPRAZOLE SODIUM, VIA: HCPCS | Performed by: PHYSICIAN ASSISTANT

## 2017-04-04 PROCEDURE — 85025 COMPLETE CBC W/AUTO DIFF WBC: CPT

## 2017-04-04 RX ADMIN — AMLODIPINE BESYLATE 10 MG: 10 TABLET ORAL at 08:04

## 2017-04-04 RX ADMIN — OXYCODONE HYDROCHLORIDE 10 MG: 5 TABLET ORAL at 10:04

## 2017-04-04 RX ADMIN — PROMETHAZINE HYDROCHLORIDE 6.25 MG: 25 INJECTION, SOLUTION INTRAMUSCULAR; INTRAVENOUS at 08:04

## 2017-04-04 RX ADMIN — IOHEXOL 75 ML: 350 INJECTION, SOLUTION INTRAVENOUS at 01:04

## 2017-04-04 RX ADMIN — SIMETHICONE CHEW TAB 80 MG 80 MG: 80 TABLET ORAL at 06:04

## 2017-04-04 RX ADMIN — ACETAMINOPHEN 650 MG: 325 TABLET ORAL at 10:04

## 2017-04-04 RX ADMIN — PROMETHAZINE HYDROCHLORIDE 6.25 MG: 25 INJECTION, SOLUTION INTRAMUSCULAR; INTRAVENOUS at 02:04

## 2017-04-04 RX ADMIN — POLYETHYLENE GLYCOL 3350 17 G: 17 POWDER, FOR SOLUTION ORAL at 08:04

## 2017-04-04 RX ADMIN — MORPHINE SULFATE 2 MG: 2 INJECTION, SOLUTION INTRAMUSCULAR; INTRAVENOUS at 02:04

## 2017-04-04 RX ADMIN — ONDANSETRON 4 MG: 2 INJECTION INTRAMUSCULAR; INTRAVENOUS at 06:04

## 2017-04-04 RX ADMIN — ACETAMINOPHEN 650 MG: 325 TABLET ORAL at 06:04

## 2017-04-04 RX ADMIN — STANDARDIZED SENNA CONCENTRATE AND DOCUSATE SODIUM 2 TABLET: 8.6; 5 TABLET, FILM COATED ORAL at 08:04

## 2017-04-04 RX ADMIN — MORPHINE SULFATE 2 MG: 2 INJECTION, SOLUTION INTRAMUSCULAR; INTRAVENOUS at 08:04

## 2017-04-04 RX ADMIN — ONDANSETRON 4 MG: 2 INJECTION INTRAMUSCULAR; INTRAVENOUS at 10:04

## 2017-04-04 RX ADMIN — ACETAMINOPHEN 650 MG: 325 TABLET ORAL at 02:04

## 2017-04-04 RX ADMIN — Medication 3 ML: at 10:04

## 2017-04-04 RX ADMIN — OXYCODONE HYDROCHLORIDE 10 MG: 5 TABLET ORAL at 06:04

## 2017-04-04 RX ADMIN — Medication 3 ML: at 02:04

## 2017-04-04 RX ADMIN — ENOXAPARIN SODIUM 40 MG: 100 INJECTION SUBCUTANEOUS at 06:04

## 2017-04-04 RX ADMIN — Medication 3 ML: at 06:04

## 2017-04-04 RX ADMIN — SIMETHICONE CHEW TAB 80 MG 80 MG: 80 TABLET ORAL at 02:04

## 2017-04-04 RX ADMIN — IOHEXOL 15 ML: 350 INJECTION, SOLUTION INTRAVENOUS at 10:04

## 2017-04-04 RX ADMIN — SIMETHICONE CHEW TAB 80 MG 80 MG: 80 TABLET ORAL at 10:04

## 2017-04-04 RX ADMIN — PANTOPRAZOLE SODIUM 40 MG: 40 INJECTION, POWDER, FOR SOLUTION INTRAVENOUS at 08:04

## 2017-04-04 NOTE — PLAN OF CARE
Problem: Patient Care Overview  Goal: Plan of Care Review  Outcome: Ongoing (interventions implemented as appropriate)  Patient progressing with POC. Pain controlled with PRN medication. Nausea controlled with PRN antiemetics. Vital signs stable. Afebrile. Safety and fall precautions active. Call light in reach. Will continue to monitor per frequent rounding.

## 2017-04-04 NOTE — PROGRESS NOTES
Progress Note  General Surgery    Admit Date: 3/31/2017  Post-operative Day:    Hospital Day: 5    SUBJECTIVE:     Follow-up For:  * No surgery found *    Patient seen and examined at bedside.   Continues to have abdominal discomfort.   Some po intake yesterday, however, 100cc of emesis.  No flatus or BM.     Scheduled Meds:   acetaminophen  650 mg Oral Q8H    amlodipine  10 mg Oral Daily    enoxaparin  40 mg Subcutaneous Q24H    Lactobacillus rhamnosus GG  1 capsule Oral Daily    pantoprazole  40 mg Intravenous Daily    polyethylene glycol  17 g Oral BID    senna-docusate 8.6-50 mg  2 tablet Oral BID    simethicone  1 tablet Oral TID    sodium chloride 0.9%  3 mL Intravenous Q8H     Continuous Infusions:   sodium chloride 0.9% 50 mL/hr at 04/02/17 0906     PRN Meds:morphine, ondansetron, oxycodone, promethazine (PHENERGAN) IVPB    Review of patient's allergies indicates:   Allergen Reactions    No known drug allergies        Review of Systems  See prior notes for additional details not listed in HPI      OBJECTIVE:     Vital Signs (Most Recent)  Temp: 98.2 °F (36.8 °C) (04/04/17 0400)  Pulse: 95 (04/04/17 0400)  Resp: 16 (04/04/17 0400)  BP: 125/78 (04/04/17 0400)  SpO2: (!) 93 % (04/04/17 0400)    Vital Signs Range (Last 24H):  Temp:  [96.3 °F (35.7 °C)-98.4 °F (36.9 °C)]   Pulse:  []   Resp:  [16-18]   BP: (119-142)/(77-88)   SpO2:  [91 %-93 %]     I & O (Last 24H):    Intake/Output Summary (Last 24 hours) at 04/04/17 0737  Last data filed at 04/03/17 1500   Gross per 24 hour   Intake              760 ml   Output              100 ml   Net              660 ml       Physical Exam:  General: NAD,   Neuro: aaox4, no obvious focal deficit   Respiratory: resps even unlabored  Cardiac: cap refill <2 sec, RRR  Abdomen: scaphoid abdomen, mildly tender, normal bowel sounds  Extremities: Warm dry and intact        Lab Results   Component Value Date    WBC 4.12 04/04/2017    HGB 9.8 (L) 04/04/2017    HCT  31.3 (L) 04/04/2017    MCV 79 (L) 04/04/2017     (H) 04/04/2017     Lab Results   Component Value Date    CREATININE 0.6 04/04/2017    BUN 12 04/04/2017     04/04/2017    K 4.6 04/04/2017     04/04/2017    CO2 27 04/04/2017    CALCIUM 7.9 (L) 04/04/2017    PHOS 3.0 04/04/2017    MG 1.7 04/04/2017         Diagnostic Results:  Labs: Reviewed    ASSESSMENT/PLAN:     Nikia Lamas is a 51 y.o. female with metastatic renal cell carcinoma s/p left radical nephrectomy currently on cabozantinib, presenting with pSBO    - Continue bologna protocol (lactobacillus, mag oxide)  - NPO.   - Ambulate  - IVF - NS @ 100   - PO pain control PRN - oxy and morphine PRN   - scheduled tylenol   - protonix daily  - Home amlodipine  - OOBTC/Ambulate  - Strict I & O's  - DVT prophylaxis  - Holding cabozantinib per hematology recs  - Colace/Miralax  - unrecorded uop, cr wnl   - H/H 9.8/31.3, will continue to monitor   - hypomagnesemia - replace   - CT a/p with po and iv contrast today  -cont to await full bowel function, may need ex lap and NELSON in the near future     Alonso Lima PGY2

## 2017-04-05 ENCOUNTER — ANESTHESIA (OUTPATIENT)
Dept: SURGERY | Facility: HOSPITAL | Age: 52
DRG: 356 | End: 2017-04-05
Payer: COMMERCIAL

## 2017-04-05 ENCOUNTER — ANESTHESIA EVENT (OUTPATIENT)
Dept: SURGERY | Facility: HOSPITAL | Age: 52
DRG: 356 | End: 2017-04-05
Payer: COMMERCIAL

## 2017-04-05 ENCOUNTER — TELEPHONE (OUTPATIENT)
Dept: PHARMACY | Facility: CLINIC | Age: 52
End: 2017-04-05

## 2017-04-05 LAB
ABO + RH BLD: NORMAL
ALBUMIN SERPL BCP-MCNC: 3.3 G/DL
ALP SERPL-CCNC: 65 U/L
ALT SERPL W/O P-5'-P-CCNC: 13 U/L
ANION GAP SERPL CALC-SCNC: 12 MMOL/L
ANION GAP SERPL CALC-SCNC: 9 MMOL/L
AST SERPL-CCNC: 28 U/L
BASOPHILS # BLD AUTO: 0.01 K/UL
BASOPHILS # BLD AUTO: 0.03 K/UL
BASOPHILS NFR BLD: 0.2 %
BASOPHILS NFR BLD: 0.5 %
BILIRUB SERPL-MCNC: 0.6 MG/DL
BLD GP AB SCN CELLS X3 SERPL QL: NORMAL
BLD PROD TYP BPU: NORMAL
BLD PROD TYP BPU: NORMAL
BLOOD UNIT EXPIRATION DATE: NORMAL
BLOOD UNIT EXPIRATION DATE: NORMAL
BLOOD UNIT TYPE CODE: 600
BLOOD UNIT TYPE CODE: 600
BLOOD UNIT TYPE: NORMAL
BLOOD UNIT TYPE: NORMAL
BUN SERPL-MCNC: 10 MG/DL
BUN SERPL-MCNC: 12 MG/DL
CALCIUM SERPL-MCNC: 7.3 MG/DL
CALCIUM SERPL-MCNC: 8.1 MG/DL
CHLORIDE SERPL-SCNC: 103 MMOL/L
CHLORIDE SERPL-SCNC: 105 MMOL/L
CO2 SERPL-SCNC: 18 MMOL/L
CO2 SERPL-SCNC: 21 MMOL/L
CODING SYSTEM: NORMAL
CODING SYSTEM: NORMAL
CREAT SERPL-MCNC: 0.6 MG/DL
CREAT SERPL-MCNC: 0.7 MG/DL
DIFFERENTIAL METHOD: ABNORMAL
DIFFERENTIAL METHOD: ABNORMAL
DISPENSE STATUS: NORMAL
DISPENSE STATUS: NORMAL
EOSINOPHIL # BLD AUTO: 0.3 K/UL
EOSINOPHIL # BLD AUTO: 0.4 K/UL
EOSINOPHIL NFR BLD: 4.1 %
EOSINOPHIL NFR BLD: 6.6 %
ERYTHROCYTE [DISTWIDTH] IN BLOOD BY AUTOMATED COUNT: 24.1 %
ERYTHROCYTE [DISTWIDTH] IN BLOOD BY AUTOMATED COUNT: 26.5 %
EST. GFR  (AFRICAN AMERICAN): >60 ML/MIN/1.73 M^2
EST. GFR  (AFRICAN AMERICAN): >60 ML/MIN/1.73 M^2
EST. GFR  (NON AFRICAN AMERICAN): >60 ML/MIN/1.73 M^2
EST. GFR  (NON AFRICAN AMERICAN): >60 ML/MIN/1.73 M^2
GLUCOSE SERPL-MCNC: 115 MG/DL
GLUCOSE SERPL-MCNC: 68 MG/DL (ref 70–110)
GLUCOSE SERPL-MCNC: 69 MG/DL
HCO3 UR-SCNC: 17.6 MMOL/L (ref 24–28)
HCT VFR BLD AUTO: 31 %
HCT VFR BLD AUTO: 32.1 %
HCT VFR BLD CALC: 20 %PCV (ref 36–54)
HGB BLD-MCNC: 10 G/DL
HGB BLD-MCNC: 10.4 G/DL
LYMPHOCYTES # BLD AUTO: 0.5 K/UL
LYMPHOCYTES # BLD AUTO: 0.7 K/UL
LYMPHOCYTES NFR BLD: 11.2 %
LYMPHOCYTES NFR BLD: 8.2 %
MAGNESIUM SERPL-MCNC: 1.4 MG/DL
MAGNESIUM SERPL-MCNC: 1.6 MG/DL
MCH RBC QN AUTO: 25.1 PG
MCH RBC QN AUTO: 25.9 PG
MCHC RBC AUTO-ENTMCNC: 32.3 %
MCHC RBC AUTO-ENTMCNC: 32.4 %
MCV RBC AUTO: 78 FL
MCV RBC AUTO: 80 FL
MONOCYTES # BLD AUTO: 0.5 K/UL
MONOCYTES # BLD AUTO: 0.5 K/UL
MONOCYTES NFR BLD: 6.8 %
MONOCYTES NFR BLD: 8.5 %
NEUTROPHILS # BLD AUTO: 4.8 K/UL
NEUTROPHILS # BLD AUTO: 5.1 K/UL
NEUTROPHILS NFR BLD: 76 %
NEUTROPHILS NFR BLD: 77.2 %
PCO2 BLDA: 41.6 MMHG (ref 35–45)
PH SMN: 7.24 [PH] (ref 7.35–7.45)
PHOSPHATE SERPL-MCNC: 2.8 MG/DL
PHOSPHATE SERPL-MCNC: 2.8 MG/DL
PLATELET # BLD AUTO: 336 K/UL
PLATELET # BLD AUTO: 448 K/UL
PMV BLD AUTO: 9.3 FL
PMV BLD AUTO: 9.6 FL
PO2 BLDA: 61 MMHG (ref 40–60)
POC BE: -10 MMOL/L
POC IONIZED CALCIUM: 1.08 MMOL/L (ref 1.06–1.42)
POC SATURATED O2: 86 % (ref 95–100)
POC TCO2: 19 MMOL/L (ref 24–29)
POCT GLUCOSE: 107 MG/DL (ref 70–110)
POTASSIUM BLD-SCNC: 4.1 MMOL/L (ref 3.5–5.1)
POTASSIUM SERPL-SCNC: 4.2 MMOL/L
POTASSIUM SERPL-SCNC: 4.3 MMOL/L
PROT SERPL-MCNC: 5.7 G/DL
RBC # BLD AUTO: 3.98 M/UL
RBC # BLD AUTO: 4.01 M/UL
SAMPLE: ABNORMAL
SODIUM BLD-SCNC: 136 MMOL/L (ref 136–145)
SODIUM SERPL-SCNC: 133 MMOL/L
SODIUM SERPL-SCNC: 135 MMOL/L
TRANS ERYTHROCYTES VOL PATIENT: NORMAL ML
TRANS ERYTHROCYTES VOL PATIENT: NORMAL ML
WBC # BLD AUTO: 6.35 K/UL
WBC # BLD AUTO: 6.59 K/UL

## 2017-04-05 PROCEDURE — 63600175 PHARM REV CODE 636 W HCPCS: Performed by: STUDENT IN AN ORGANIZED HEALTH CARE EDUCATION/TRAINING PROGRAM

## 2017-04-05 PROCEDURE — 85025 COMPLETE CBC W/AUTO DIFF WBC: CPT

## 2017-04-05 PROCEDURE — P9021 RED BLOOD CELLS UNIT: HCPCS

## 2017-04-05 PROCEDURE — 49000 EXPLORATION OF ABDOMEN: CPT | Mod: ,,, | Performed by: SURGERY

## 2017-04-05 PROCEDURE — 63600175 PHARM REV CODE 636 W HCPCS: Performed by: NURSE ANESTHETIST, CERTIFIED REGISTERED

## 2017-04-05 PROCEDURE — 25000003 PHARM REV CODE 250: Performed by: ANESTHESIOLOGY

## 2017-04-05 PROCEDURE — 36000708 HC OR TIME LEV III 1ST 15 MIN: Performed by: SURGERY

## 2017-04-05 PROCEDURE — 37000008 HC ANESTHESIA 1ST 15 MINUTES: Performed by: SURGERY

## 2017-04-05 PROCEDURE — 36000709 HC OR TIME LEV III EA ADD 15 MIN: Performed by: SURGERY

## 2017-04-05 PROCEDURE — 83735 ASSAY OF MAGNESIUM: CPT | Mod: 91

## 2017-04-05 PROCEDURE — 63600175 PHARM REV CODE 636 W HCPCS

## 2017-04-05 PROCEDURE — 88305 TISSUE EXAM BY PATHOLOGIST: CPT | Mod: 26,,, | Performed by: PATHOLOGY

## 2017-04-05 PROCEDURE — 71000039 HC RECOVERY, EACH ADD'L HOUR: Performed by: SURGERY

## 2017-04-05 PROCEDURE — 25000003 PHARM REV CODE 250

## 2017-04-05 PROCEDURE — 80048 BASIC METABOLIC PNL TOTAL CA: CPT

## 2017-04-05 PROCEDURE — 63600175 PHARM REV CODE 636 W HCPCS: Performed by: SURGERY

## 2017-04-05 PROCEDURE — 25000003 PHARM REV CODE 250: Performed by: STUDENT IN AN ORGANIZED HEALTH CARE EDUCATION/TRAINING PROGRAM

## 2017-04-05 PROCEDURE — 43830 GSTRST OPEN WO CONSTJ TUBE: CPT | Mod: 59,,, | Performed by: SURGERY

## 2017-04-05 PROCEDURE — 63600175 PHARM REV CODE 636 W HCPCS: Performed by: ANESTHESIOLOGY

## 2017-04-05 PROCEDURE — 11000001 HC ACUTE MED/SURG PRIVATE ROOM

## 2017-04-05 PROCEDURE — 36415 COLL VENOUS BLD VENIPUNCTURE: CPT

## 2017-04-05 PROCEDURE — C9113 INJ PANTOPRAZOLE SODIUM, VIA: HCPCS | Performed by: PHYSICIAN ASSISTANT

## 2017-04-05 PROCEDURE — 25000003 PHARM REV CODE 250: Performed by: NURSE ANESTHETIST, CERTIFIED REGISTERED

## 2017-04-05 PROCEDURE — 0DBW0ZX EXCISION OF PERITONEUM, OPEN APPROACH, DIAGNOSTIC: ICD-10-PCS | Performed by: SURGERY

## 2017-04-05 PROCEDURE — 86920 COMPATIBILITY TEST SPIN: CPT

## 2017-04-05 PROCEDURE — 86900 BLOOD TYPING SEROLOGIC ABO: CPT

## 2017-04-05 PROCEDURE — 88342 IMHCHEM/IMCYTCHM 1ST ANTB: CPT | Mod: 26,,, | Performed by: PATHOLOGY

## 2017-04-05 PROCEDURE — 37000009 HC ANESTHESIA EA ADD 15 MINS: Performed by: SURGERY

## 2017-04-05 PROCEDURE — 88331 PATH CONSLTJ SURG 1 BLK 1SPC: CPT | Mod: 26,,, | Performed by: PATHOLOGY

## 2017-04-05 PROCEDURE — 63600175 PHARM REV CODE 636 W HCPCS: Performed by: PHYSICIAN ASSISTANT

## 2017-04-05 PROCEDURE — 88341 IMHCHEM/IMCYTCHM EA ADD ANTB: CPT | Mod: 26,,, | Performed by: PATHOLOGY

## 2017-04-05 PROCEDURE — 80053 COMPREHEN METABOLIC PANEL: CPT

## 2017-04-05 PROCEDURE — 0DH63UZ INSERTION OF FEEDING DEVICE INTO STOMACH, PERCUTANEOUS APPROACH: ICD-10-PCS | Performed by: SURGERY

## 2017-04-05 PROCEDURE — D9220A PRA ANESTHESIA: Mod: CRNA,,, | Performed by: NURSE ANESTHETIST, CERTIFIED REGISTERED

## 2017-04-05 PROCEDURE — 27000221 HC OXYGEN, UP TO 24 HOURS

## 2017-04-05 PROCEDURE — D9220A PRA ANESTHESIA: Mod: ANES,,, | Performed by: ANESTHESIOLOGY

## 2017-04-05 PROCEDURE — 25000003 PHARM REV CODE 250: Performed by: SURGERY

## 2017-04-05 PROCEDURE — 84100 ASSAY OF PHOSPHORUS: CPT

## 2017-04-05 PROCEDURE — 86850 RBC ANTIBODY SCREEN: CPT

## 2017-04-05 PROCEDURE — 71000033 HC RECOVERY, INTIAL HOUR: Performed by: SURGERY

## 2017-04-05 PROCEDURE — P9045 ALBUMIN (HUMAN), 5%, 250 ML: HCPCS | Performed by: NURSE ANESTHETIST, CERTIFIED REGISTERED

## 2017-04-05 PROCEDURE — 88305 TISSUE EXAM BY PATHOLOGIST: CPT | Performed by: PATHOLOGY

## 2017-04-05 RX ORDER — DEXTROSE MONOHYDRATE 50 MG/ML
INJECTION, SOLUTION INTRAVENOUS CONTINUOUS PRN
Status: DISCONTINUED | OUTPATIENT
Start: 2017-04-05 | End: 2017-04-05

## 2017-04-05 RX ORDER — FUROSEMIDE 10 MG/ML
INJECTION INTRAMUSCULAR; INTRAVENOUS
Status: DISPENSED
Start: 2017-04-05 | End: 2017-04-06

## 2017-04-05 RX ORDER — MIDAZOLAM HYDROCHLORIDE 1 MG/ML
INJECTION, SOLUTION INTRAMUSCULAR; INTRAVENOUS
Status: DISCONTINUED | OUTPATIENT
Start: 2017-04-05 | End: 2017-04-05

## 2017-04-05 RX ORDER — NALOXONE HCL 0.4 MG/ML
0.02 VIAL (ML) INJECTION
Status: DISCONTINUED | OUTPATIENT
Start: 2017-04-05 | End: 2017-04-19 | Stop reason: HOSPADM

## 2017-04-05 RX ORDER — LIDOCAINE HCL/PF 100 MG/5ML
SYRINGE (ML) INTRAVENOUS
Status: DISCONTINUED | OUTPATIENT
Start: 2017-04-05 | End: 2017-04-05

## 2017-04-05 RX ORDER — SODIUM CHLORIDE 0.9 % (FLUSH) 0.9 %
3 SYRINGE (ML) INJECTION EVERY 8 HOURS
Status: DISCONTINUED | OUTPATIENT
Start: 2017-04-05 | End: 2017-04-05 | Stop reason: HOSPADM

## 2017-04-05 RX ORDER — FUROSEMIDE 10 MG/ML
20 INJECTION INTRAMUSCULAR; INTRAVENOUS ONCE
Status: COMPLETED | OUTPATIENT
Start: 2017-04-05 | End: 2017-04-05

## 2017-04-05 RX ORDER — HYDROMORPHONE HCL IN 0.9% NACL 6 MG/30 ML
PATIENT CONTROLLED ANALGESIA SYRINGE INTRAVENOUS
Status: COMPLETED
Start: 2017-04-05 | End: 2017-04-05

## 2017-04-05 RX ORDER — KETAMINE HYDROCHLORIDE 100 MG/ML
INJECTION, SOLUTION INTRAMUSCULAR; INTRAVENOUS
Status: DISCONTINUED | OUTPATIENT
Start: 2017-04-05 | End: 2017-04-05

## 2017-04-05 RX ORDER — HYDROMORPHONE HYDROCHLORIDE 1 MG/ML
0.2 INJECTION, SOLUTION INTRAMUSCULAR; INTRAVENOUS; SUBCUTANEOUS EVERY 5 MIN PRN
Status: DISCONTINUED | OUTPATIENT
Start: 2017-04-05 | End: 2017-04-05 | Stop reason: HOSPADM

## 2017-04-05 RX ORDER — LANOLIN ALCOHOL/MO/W.PET/CERES
400 CREAM (GRAM) TOPICAL ONCE
Status: COMPLETED | OUTPATIENT
Start: 2017-04-05 | End: 2017-04-05

## 2017-04-05 RX ORDER — ALBUMIN HUMAN 50 G/1000ML
SOLUTION INTRAVENOUS CONTINUOUS PRN
Status: DISCONTINUED | OUTPATIENT
Start: 2017-04-05 | End: 2017-04-05

## 2017-04-05 RX ORDER — PROPOFOL 10 MG/ML
VIAL (ML) INTRAVENOUS
Status: DISCONTINUED | OUTPATIENT
Start: 2017-04-05 | End: 2017-04-05

## 2017-04-05 RX ORDER — HYDROMORPHONE HCL IN 0.9% NACL 6 MG/30 ML
PATIENT CONTROLLED ANALGESIA SYRINGE INTRAVENOUS CONTINUOUS
Status: DISCONTINUED | OUTPATIENT
Start: 2017-04-05 | End: 2017-04-19 | Stop reason: HOSPADM

## 2017-04-05 RX ORDER — SUCCINYLCHOLINE CHLORIDE 20 MG/ML
INJECTION INTRAMUSCULAR; INTRAVENOUS
Status: DISCONTINUED | OUTPATIENT
Start: 2017-04-05 | End: 2017-04-05

## 2017-04-05 RX ORDER — DEXAMETHASONE SODIUM PHOSPHATE 4 MG/ML
INJECTION, SOLUTION INTRA-ARTICULAR; INTRALESIONAL; INTRAMUSCULAR; INTRAVENOUS; SOFT TISSUE
Status: DISCONTINUED | OUTPATIENT
Start: 2017-04-05 | End: 2017-04-05

## 2017-04-05 RX ORDER — ROCURONIUM BROMIDE 10 MG/ML
INJECTION, SOLUTION INTRAVENOUS
Status: DISCONTINUED | OUTPATIENT
Start: 2017-04-05 | End: 2017-04-05

## 2017-04-05 RX ORDER — ONDANSETRON 2 MG/ML
INJECTION INTRAMUSCULAR; INTRAVENOUS
Status: DISCONTINUED | OUTPATIENT
Start: 2017-04-05 | End: 2017-04-05

## 2017-04-05 RX ORDER — FENTANYL CITRATE 50 UG/ML
INJECTION, SOLUTION INTRAMUSCULAR; INTRAVENOUS
Status: DISCONTINUED | OUTPATIENT
Start: 2017-04-05 | End: 2017-04-05

## 2017-04-05 RX ORDER — SODIUM CHLORIDE 0.9 % (FLUSH) 0.9 %
3 SYRINGE (ML) INJECTION
Status: DISCONTINUED | OUTPATIENT
Start: 2017-04-05 | End: 2017-04-05 | Stop reason: HOSPADM

## 2017-04-05 RX ORDER — ONDANSETRON 2 MG/ML
4 INJECTION INTRAMUSCULAR; INTRAVENOUS EVERY 8 HOURS PRN
Status: DISCONTINUED | OUTPATIENT
Start: 2017-04-05 | End: 2017-04-09

## 2017-04-05 RX ORDER — DIPHENHYDRAMINE HYDROCHLORIDE 50 MG/ML
25 INJECTION INTRAMUSCULAR; INTRAVENOUS EVERY 6 HOURS PRN
Status: DISCONTINUED | OUTPATIENT
Start: 2017-04-05 | End: 2017-04-05 | Stop reason: HOSPADM

## 2017-04-05 RX ADMIN — PROMETHAZINE HYDROCHLORIDE 6.25 MG: 25 INJECTION, SOLUTION INTRAMUSCULAR; INTRAVENOUS at 05:04

## 2017-04-05 RX ADMIN — CALCIUM CHLORIDE 500 MG: 100 INJECTION, SOLUTION INTRAVENOUS at 12:04

## 2017-04-05 RX ADMIN — ONDANSETRON 4 MG: 2 INJECTION INTRAMUSCULAR; INTRAVENOUS at 12:04

## 2017-04-05 RX ADMIN — ALBUMIN (HUMAN): 12.5 SOLUTION INTRAVENOUS at 12:04

## 2017-04-05 RX ADMIN — ACETAMINOPHEN 650 MG: 325 TABLET ORAL at 05:04

## 2017-04-05 RX ADMIN — FENTANYL CITRATE 50 MCG: 50 INJECTION, SOLUTION INTRAMUSCULAR; INTRAVENOUS at 12:04

## 2017-04-05 RX ADMIN — FENTANYL CITRATE 50 MCG: 50 INJECTION, SOLUTION INTRAMUSCULAR; INTRAVENOUS at 11:04

## 2017-04-05 RX ADMIN — HYDROMORPHONE HYDROCHLORIDE 0.2 MG: 1 INJECTION, SOLUTION INTRAMUSCULAR; INTRAVENOUS; SUBCUTANEOUS at 02:04

## 2017-04-05 RX ADMIN — FENTANYL CITRATE 25 MCG: 50 INJECTION, SOLUTION INTRAMUSCULAR; INTRAVENOUS at 12:04

## 2017-04-05 RX ADMIN — Medication 1 CAPSULE: at 08:04

## 2017-04-05 RX ADMIN — Medication 3 ML: at 03:04

## 2017-04-05 RX ADMIN — Medication 3 ML: at 10:04

## 2017-04-05 RX ADMIN — ENOXAPARIN SODIUM 40 MG: 100 INJECTION SUBCUTANEOUS at 08:04

## 2017-04-05 RX ADMIN — SODIUM CHLORIDE: 0.9 INJECTION, SOLUTION INTRAVENOUS at 10:04

## 2017-04-05 RX ADMIN — MAGNESIUM OXIDE TAB 400 MG (241.3 MG ELEMENTAL MG) 400 MG: 400 (241.3 MG) TAB at 08:04

## 2017-04-05 RX ADMIN — HYDROMORPHONE HYDROCHLORIDE 0.2 MG: 1 INJECTION, SOLUTION INTRAMUSCULAR; INTRAVENOUS; SUBCUTANEOUS at 01:04

## 2017-04-05 RX ADMIN — SODIUM CHLORIDE: 0.9 INJECTION, SOLUTION INTRAVENOUS at 01:04

## 2017-04-05 RX ADMIN — MORPHINE SULFATE 2 MG: 2 INJECTION, SOLUTION INTRAMUSCULAR; INTRAVENOUS at 12:04

## 2017-04-05 RX ADMIN — KETAMINE HYDROCHLORIDE 20 MG: 100 INJECTION, SOLUTION, CONCENTRATE INTRAMUSCULAR; INTRAVENOUS at 01:04

## 2017-04-05 RX ADMIN — SODIUM CHLORIDE, SODIUM GLUCONATE, SODIUM ACETATE, POTASSIUM CHLORIDE, MAGNESIUM CHLORIDE, SODIUM PHOSPHATE, DIBASIC, AND POTASSIUM PHOSPHATE: .53; .5; .37; .037; .03; .012; .00082 INJECTION, SOLUTION INTRAVENOUS at 01:04

## 2017-04-05 RX ADMIN — LIDOCAINE HYDROCHLORIDE 50 MG: 20 INJECTION, SOLUTION INTRAVENOUS at 11:04

## 2017-04-05 RX ADMIN — MIDAZOLAM HYDROCHLORIDE 1 MG: 1 INJECTION, SOLUTION INTRAMUSCULAR; INTRAVENOUS at 11:04

## 2017-04-05 RX ADMIN — Medication: at 01:04

## 2017-04-05 RX ADMIN — AMLODIPINE BESYLATE 10 MG: 10 TABLET ORAL at 08:04

## 2017-04-05 RX ADMIN — MORPHINE SULFATE 2 MG: 2 INJECTION, SOLUTION INTRAMUSCULAR; INTRAVENOUS at 05:04

## 2017-04-05 RX ADMIN — GLYCOPYRROLATE 0.4 MG: 0.2 INJECTION, SOLUTION INTRAMUSCULAR; INTRAVENOUS at 12:04

## 2017-04-05 RX ADMIN — SODIUM CHLORIDE, SODIUM GLUCONATE, SODIUM ACETATE, POTASSIUM CHLORIDE, MAGNESIUM CHLORIDE, SODIUM PHOSPHATE, DIBASIC, AND POTASSIUM PHOSPHATE: .53; .5; .37; .037; .03; .012; .00082 INJECTION, SOLUTION INTRAVENOUS at 11:04

## 2017-04-05 RX ADMIN — FENTANYL CITRATE 25 MCG: 50 INJECTION, SOLUTION INTRAMUSCULAR; INTRAVENOUS at 01:04

## 2017-04-05 RX ADMIN — NEOSTIGMINE METHYLSULFATE 2.5 MG: 1 INJECTION INTRAVENOUS at 12:04

## 2017-04-05 RX ADMIN — PROPOFOL 100 MG: 10 INJECTION, EMULSION INTRAVENOUS at 11:04

## 2017-04-05 RX ADMIN — PANTOPRAZOLE SODIUM 40 MG: 40 INJECTION, POWDER, FOR SOLUTION INTRAVENOUS at 09:04

## 2017-04-05 RX ADMIN — DEXAMETHASONE SODIUM PHOSPHATE 8 MG: 4 INJECTION, SOLUTION INTRAMUSCULAR; INTRAVENOUS at 12:04

## 2017-04-05 RX ADMIN — FUROSEMIDE 20 MG: 10 INJECTION, SOLUTION INTRAMUSCULAR; INTRAVENOUS at 03:04

## 2017-04-05 RX ADMIN — OXYCODONE HYDROCHLORIDE 10 MG: 5 TABLET ORAL at 08:04

## 2017-04-05 RX ADMIN — ACETAMINOPHEN 650 MG: 325 TABLET ORAL at 11:04

## 2017-04-05 RX ADMIN — ROCURONIUM BROMIDE 10 MG: 10 INJECTION, SOLUTION INTRAVENOUS at 12:04

## 2017-04-05 RX ADMIN — SIMETHICONE CHEW TAB 80 MG 80 MG: 80 TABLET ORAL at 05:04

## 2017-04-05 RX ADMIN — DEXTROSE MONOHYDRATE: 5 INJECTION, SOLUTION INTRAVENOUS at 01:04

## 2017-04-05 RX ADMIN — Medication: at 07:04

## 2017-04-05 RX ADMIN — SUCCINYLCHOLINE CHLORIDE 100 MG: 20 INJECTION, SOLUTION INTRAMUSCULAR; INTRAVENOUS at 11:04

## 2017-04-05 NOTE — PROGRESS NOTES
Dr. Pollack notified of patient's O2 sats of 84% on room air.  Patient placed on 2L NC O2 sats 91-97%

## 2017-04-05 NOTE — BRIEF OP NOTE
[facility]  Brief Operative Note    SUMMARY     Surgery Date: 4/5/2017     Surgeon(s) and Role:     * Tyrone Parra MD - Resident - Assisting     * Kermit Crawley MD - Primary        Pre-op Diagnosis:  Small bowel obstruction [K56.69]  Nausea and vomiting [R11.2]    Post-op Diagnosis:  Post-Op Diagnosis Codes:     * Small bowel obstruction [K56.69]     * Nausea and vomiting [R11.2]    Procedure(s) (LRB):  LAPAROTOMY-EXPLORATORY (N/A)  GASTROSTOMY (N/A)    Anesthesia: General    Description of Procedure: ex-lap    Description of the findings of the procedure: ex-lap. Severe adhesions with large amount of clear ascites. Thickening of peritoneum and omental caking when entering abdomen, consistent with intra-abdominal malignancy. Frozen and permanent biopsies sent.     Estimated Blood Loss: 100 mL         Specimens:   Specimen (12h ago through future)    Start     Ordered    04/05/17 1241  Specimen to Pathology - Surgery  Once     Comments:  1. Peritoneal (frozen)  2. Omentum (perm)    04/05/17 1241        Tyrone Parra PGy5

## 2017-04-05 NOTE — TRANSFER OF CARE
"Anesthesia Transfer of Care Note    Patient: Nikia Lamas    Procedure(s) Performed: Procedure(s) (LRB):  LAPAROTOMY-EXPLORATORY (N/A)  GASTROSTOMY (N/A)    Patient location: PACU    Anesthesia Type: general    Transport from OR: Transported from OR on 6-10 L/min O2 by face mask with adequate spontaneous ventilation    Post pain: adequate analgesia    Post assessment: no apparent anesthetic complications and tolerated procedure well    Post vital signs: stable    Level of consciousness: sedated and responds to stimulation    Nausea/Vomiting: no nausea/vomiting    Complications: none          Last vitals:   Visit Vitals    BP (!) 143/92    Pulse (!) 118    Temp 37.1 °C (98.8 °F) (Axillary)    Resp 20    Ht 5' 4" (1.626 m)    Wt 40.8 kg (90 lb)    LMP 01/03/2017    SpO2 (!) 89%    Breastfeeding No    BMI 15.45 kg/m2     "

## 2017-04-05 NOTE — PROGRESS NOTES
Progress Note  General Surgery    Admit Date: 3/31/2017  Post-operative Day:    Hospital Day: 6    SUBJECTIVE:     Follow-up For:  * No surgery found *    Patient seen and examined at bedside this AM.  Continues to have abdominal discomfort.   Minimal PO intake.  Hiccups+.  No bowel function.    Scheduled Meds:   acetaminophen  650 mg Oral Q8H    amlodipine  10 mg Oral Daily    enoxaparin  40 mg Subcutaneous Q24H    Lactobacillus rhamnosus GG  1 capsule Oral Daily    magnesium oxide  400 mg Oral Once    pantoprazole  40 mg Intravenous Daily    polyethylene glycol  17 g Oral BID    senna-docusate 8.6-50 mg  2 tablet Oral BID    simethicone  1 tablet Oral TID    sodium chloride 0.9%  3 mL Intravenous Q8H     Continuous Infusions:   sodium chloride 0.9% 100 mL/hr at 04/04/17 0735     PRN Meds:morphine, omnipaque, ondansetron, oxycodone, promethazine (PHENERGAN) IVPB    Review of patient's allergies indicates:   Allergen Reactions    No known drug allergies        Review of Systems  See prior notes for additional details not listed in HPI      OBJECTIVE:     Vital Signs (Most Recent)  Temp: 96.7 °F (35.9 °C) (04/05/17 0445)  Pulse: (!) 114 (04/05/17 0445)  Resp: 18 (04/05/17 0445)  BP: 122/78 (04/05/17 0445)  SpO2: 96 % (04/05/17 0445)    Vital Signs Range (Last 24H):  Temp:  [96.7 °F (35.9 °C)-98.4 °F (36.9 °C)]   Pulse:  [101-114]   Resp:  [16-18]   BP: (121-130)/(76-80)   SpO2:  [92 %-98 %]     I & O (Last 24H):    Intake/Output Summary (Last 24 hours) at 04/05/17 0816  Last data filed at 04/05/17 0600   Gross per 24 hour   Intake             3040 ml   Output                0 ml   Net             3040 ml       Physical Exam:  General: NAD,   Neuro: aaox4, no obvious focal deficit   Respiratory: resps even unlabored  Cardiac: cap refill <2 sec, RRR  Abdomen: scaphoid abdomen, mildly tender, normal bowel sounds  Extremities: Warm dry and intact      Lab Results   Component Value Date    WBC 6.35 04/05/2017     HGB 10.0 (L) 04/05/2017    HCT 31.0 (L) 04/05/2017    MCV 78 (L) 04/05/2017     (H) 04/05/2017     Lab Results   Component Value Date    CREATININE 0.6 04/05/2017    BUN 12 04/05/2017     (L) 04/05/2017    K 4.3 04/05/2017     04/05/2017    CO2 21 (L) 04/05/2017    CALCIUM 7.3 (L) 04/05/2017    PHOS 2.8 04/05/2017    MG 1.4 (L) 04/05/2017         Diagnostic Results:  Labs: Reviewed    ASSESSMENT/PLAN:     Nikia Lamas is a 51 y.o. female with metastatic renal cell carcinoma s/p left radical nephrectomy currently on cabozantinib, presenting with pSBO    - Continue bologna protocol (lactobacillus, mag oxide)  - Patient with little appetite and now having problems with hiccups yet no nausea or vomiting. This is likely a representation of failed conservative management at this point.  Will discuss with staff the need for operative intervention today with exploratory laparotomy to relieve her obstruction  - Ambulate  - IVF - NS @ 100   - PO pain control PRN - oxy and morphine PRN   - scheduled tylenol   - protonix daily  - Home amlodipine  - OOBTC/Ambulate  - Strict I & O's  - DVT prophylaxis  - Holding cabozantinib per hematology recs  - cr wnl   - hypomagnesemia - replace    C. Ulises Wade MD  PGY-3  Pager: [phone]    I have personally performed a detailed history and physical examination on this patient. My findings are summarized in the resident's note included in the record.  Failure to resolve bowel obstruction based on radiographic and clinical criteria has led to a decision to proceed to surgery for ex lap

## 2017-04-05 NOTE — PROGRESS NOTES
Updated pt's son, Dane, on plan of care via phone. All questions answered. Pt's son verbalizes understanding.

## 2017-04-05 NOTE — ANESTHESIA PREPROCEDURE EVALUATION
04/05/2017  Nikia Lamas is a 51 y.o., female.    [facility] Anesthesia Evaluation         Review of Systems  Anesthesia Hx:  No problems with previous Anesthesia   Social:  Non-Smoker    Hematology/Oncology:        Current/Recent Cancer.   Cardiovascular:   Exercise tolerance: good Denies Hypertension.  Denies CAD.     Denies Angina.  Functional Capacity Can you climb two flights of stairs? ==> Yes    Pulmonary:   Denies Asthma.  Denies Recent URI.  Denies Sleep Apnea.    Renal/:  Renal/ Normal     Hepatic/GI:   Denies PUD. Denies Hiatal Hernia.  Denies GERD. Denies Liver Disease.  Denies Hepatitis.    Neurological:   Denies CVA. Denies Seizures.    Endocrine:   Denies Diabetes. Denies Hypothyroidism.        Physical Exam  General:  Well nourished         Nauseated, hiccups                  Airway/Jaw/Neck:  Airway Findings: Mouth Opening: Normal Tongue: Normal  General Airway Assessment: Adult  Mallampati: I  TM Distance: Normal, at least 6 cm  Jaw/Neck Findings:  Neck ROM: Normal ROM  Neck Findings:     Eyes/Ears/Nose:  EYES/EARS/NOSE FINDINGS: Normal   Dental:  Dental Findings: In tact   Chest/Lungs:  Chest/Lungs Findings: Clear to auscultation     Heart/Vascular:  Heart Findings: Rate: Normal  Rhythm: Regular Rhythm  Sounds: Normal        Mental Status:  Mental Status Findings:  Alert and Oriented         Anesthesia Plan  Type of Anesthesia, risks & benefits discussed:  Anesthesia Type:  general  Patient's Preference: Proceed with anesthesia understanding that the risks are very small but could be serious or life threatening.  Intra-op Monitoring Plan: standard ASA monitors  Intra-op Monitoring Plan Comments:   Post Op Pain Control Plan:   Post Op Pain Control Plan Comments:   Induction:   IV  Beta Blocker:  Patient is not currently on a Beta-Blocker (No further documentation required).       Informed  Consent: Patient understands risks and agrees with Anesthesia plan.  Questions answered. Anesthesia consent signed with patient.  ASA Score: 3     Day of Surgery Review of History & Physical: I have interviewed and examined the patient. I have reviewed the patient's H&P dated:            Ready For Surgery From Anesthesia Perspective.

## 2017-04-05 NOTE — PROGRESS NOTES
1415: Pt's O2 sats remain 88-90% on 10L simple face mask. Pt drowsy; arouses to voice; orientedx4. Lung sounds clear to auscultation. Sudha, RT at bedside to assess. Pt placed on aerosol face mask per RT.     1445: Dr. Elmore with anesthesia at bedside. CXR reviewed. MD states pleural edema noted on CXR and to call surgery service for lasix orders.     1500: Spoke with Dr. Jeong with surgery. Orders received for 20mg IV lasix; orders implemented; see MAR. Will continue to monitor.

## 2017-04-05 NOTE — OP NOTE
DATE OF PROCEDURE:  04/05/2017.    PREOPERATIVE DIAGNOSIS:  Small bowel obstruction.    POSTOPERATIVE DIAGNOSIS:  Small bowel obstruction secondary to an   intra-abdominal recurrence of renal cell cancer.    PROCEDURE PERFORMED:  Exploratory laparotomy, biopsy of peritoneum and   gastrostomy tube.    SURGEON:  Kermit Crawley M.D.    ASSISTANT:  Dr. Parra.    BLOOD LOSS:  Minimal.    COMPLICATIONS:  None.    ANESTHESIA:  General endotracheal.    INDICATIONS FOR PROCEDURE:  This is a 51-year-old patient who was admitted   several days ago with a bowel obstruction.  The patient has a known diagnosis of   metastatic renal cell cancer, underwent nephrectomy three months prior.  At   that time, there was no evidence of abdominal disease.  CT scan did not   demonstrate clear evidence of intraabdominal malignancy; however, on opening the   peritoneal cavity, wide spread diffuse peritoneal disease, both on the parietal   as well as the visceral peritoneum was identified.  A focal area of obstruction   could not be identified, and I think her bowel dilation and bowel dysfunction   is more a manifestation of the diffuse widespread disease and involvement of the   tumor than any focal obstruction.    OPERATIVE REPORT IN DETAIL:  The patient was brought to the Operating Room and   placed in the supine position, prepped and draped in sterile fashion.  Once   satisfactory general anesthesia was induced, peritoneal cavity was uneventfully   entered.  About a liter and a half of ascites was aspirated on entering the   abdomen.  The abdomen was explored, bowel loops were matted, but were able to be    other than the loops that were plastered to the left retroperitoneum,   presumably where the nephrectomy was done, but one looking the abdomen it was   apparent that the patient had widespread peritoneal disease, sample of which was   biopsied and sent to Pathology for permanent section to confirm our clinical   impression.   No surgically correctable cause of her bowel obstruction could be   identified.  An incision was made to close and place a palliative gastrostomy   tube for decompression.  The fascia was closed with a running #1 PDS.  Skin was   closed with clips, however, prior to closure, an 18-Matthews was brought in through   a left upper quadrant stab incision and secured to the stomach in a standard   Witzel technique using 2-0 silk.  With the abdomen closed, the gastrostomy was   secured to skin with a nylon suture and noted to be patent by flushing with   saline and demonstrating a bilious effluent.  Again, needle, sponge and   instrument counts were correct.  The patient remained stable throughout the   operation.      GF/HN  dd: 04/05/2017 13:06:46 (CDT)  td: 04/05/2017 15:02:00 (CDT)  Doc ID   #[id number]  Job ID #[id number]    CC:

## 2017-04-05 NOTE — PROGRESS NOTES
Dr. Jeong at bedside and aware of pt status. Labs reviewed. No new orders noted at this time. Ok to administer lovenox today per MD.

## 2017-04-06 LAB
ANION GAP SERPL CALC-SCNC: 11 MMOL/L
BASOPHILS # BLD AUTO: 0.01 K/UL
BASOPHILS NFR BLD: 0.1 %
BUN SERPL-MCNC: 9 MG/DL
CALCIUM SERPL-MCNC: 8.1 MG/DL
CHLORIDE SERPL-SCNC: 104 MMOL/L
CO2 SERPL-SCNC: 24 MMOL/L
CREAT SERPL-MCNC: 0.7 MG/DL
DIASTOLIC DYSFUNCTION: NO
DIFFERENTIAL METHOD: ABNORMAL
EOSINOPHIL # BLD AUTO: 0 K/UL
EOSINOPHIL NFR BLD: 0 %
ERYTHROCYTE [DISTWIDTH] IN BLOOD BY AUTOMATED COUNT: 22.1 %
EST. GFR  (AFRICAN AMERICAN): >60 ML/MIN/1.73 M^2
EST. GFR  (NON AFRICAN AMERICAN): >60 ML/MIN/1.73 M^2
ESTIMATED PA SYSTOLIC PRESSURE: 25
GLUCOSE SERPL-MCNC: 103 MG/DL
HCT VFR BLD AUTO: 38.7 %
HGB BLD-MCNC: 13 G/DL
LYMPHOCYTES # BLD AUTO: 0.6 K/UL
LYMPHOCYTES NFR BLD: 5.4 %
MAGNESIUM SERPL-MCNC: 1.5 MG/DL
MCH RBC QN AUTO: 26.5 PG
MCHC RBC AUTO-ENTMCNC: 33.6 %
MCV RBC AUTO: 79 FL
MONOCYTES # BLD AUTO: 1 K/UL
MONOCYTES NFR BLD: 9.4 %
NEUTROPHILS # BLD AUTO: 8.7 K/UL
NEUTROPHILS NFR BLD: 84.9 %
PHOSPHATE SERPL-MCNC: 3.2 MG/DL
PLATELET # BLD AUTO: 361 K/UL
PMV BLD AUTO: 9.1 FL
POTASSIUM SERPL-SCNC: 4.8 MMOL/L
RBC # BLD AUTO: 4.9 M/UL
RETIRED EF AND QEF - SEE NOTES: 65 (ref 55–65)
SODIUM SERPL-SCNC: 139 MMOL/L
TRICUSPID VALVE REGURGITATION: NORMAL
WBC # BLD AUTO: 10.23 K/UL

## 2017-04-06 PROCEDURE — 25000003 PHARM REV CODE 250

## 2017-04-06 PROCEDURE — 63600175 PHARM REV CODE 636 W HCPCS: Performed by: STUDENT IN AN ORGANIZED HEALTH CARE EDUCATION/TRAINING PROGRAM

## 2017-04-06 PROCEDURE — 85025 COMPLETE CBC W/AUTO DIFF WBC: CPT

## 2017-04-06 PROCEDURE — 76937 US GUIDE VASCULAR ACCESS: CPT

## 2017-04-06 PROCEDURE — 63600175 PHARM REV CODE 636 W HCPCS: Performed by: SURGERY

## 2017-04-06 PROCEDURE — 25000003 PHARM REV CODE 250: Performed by: STUDENT IN AN ORGANIZED HEALTH CARE EDUCATION/TRAINING PROGRAM

## 2017-04-06 PROCEDURE — 93306 TTE W/DOPPLER COMPLETE: CPT | Mod: 26,,, | Performed by: INTERNAL MEDICINE

## 2017-04-06 PROCEDURE — 93306 TTE W/DOPPLER COMPLETE: CPT

## 2017-04-06 PROCEDURE — 02HV33Z INSERTION OF INFUSION DEVICE INTO SUPERIOR VENA CAVA, PERCUTANEOUS APPROACH: ICD-10-PCS | Performed by: SURGERY

## 2017-04-06 PROCEDURE — 25000003 PHARM REV CODE 250: Performed by: INTERNAL MEDICINE

## 2017-04-06 PROCEDURE — 11000001 HC ACUTE MED/SURG PRIVATE ROOM

## 2017-04-06 PROCEDURE — 99233 SBSQ HOSP IP/OBS HIGH 50: CPT | Mod: ,,, | Performed by: INTERNAL MEDICINE

## 2017-04-06 PROCEDURE — 36415 COLL VENOUS BLD VENIPUNCTURE: CPT

## 2017-04-06 PROCEDURE — 63600175 PHARM REV CODE 636 W HCPCS: Performed by: PHYSICIAN ASSISTANT

## 2017-04-06 PROCEDURE — C1751 CATH, INF, PER/CENT/MIDLINE: HCPCS

## 2017-04-06 PROCEDURE — 83735 ASSAY OF MAGNESIUM: CPT

## 2017-04-06 PROCEDURE — 84100 ASSAY OF PHOSPHORUS: CPT

## 2017-04-06 PROCEDURE — 36569 INSJ PICC 5 YR+ W/O IMAGING: CPT

## 2017-04-06 PROCEDURE — 97802 MEDICAL NUTRITION INDIV IN: CPT

## 2017-04-06 PROCEDURE — C9113 INJ PANTOPRAZOLE SODIUM, VIA: HCPCS | Performed by: PHYSICIAN ASSISTANT

## 2017-04-06 PROCEDURE — 80048 BASIC METABOLIC PNL TOTAL CA: CPT

## 2017-04-06 RX ORDER — FUROSEMIDE 10 MG/ML
20 INJECTION INTRAMUSCULAR; INTRAVENOUS ONCE
Status: COMPLETED | OUTPATIENT
Start: 2017-04-06 | End: 2017-04-06

## 2017-04-06 RX ORDER — FENTANYL 100 UG/H
1 PATCH TRANSDERMAL
Status: DISCONTINUED | OUTPATIENT
Start: 2017-04-06 | End: 2017-04-11

## 2017-04-06 RX ORDER — SODIUM CHLORIDE 0.9 % (FLUSH) 0.9 %
10 SYRINGE (ML) INJECTION EVERY 6 HOURS
Status: DISCONTINUED | OUTPATIENT
Start: 2017-04-06 | End: 2017-04-19 | Stop reason: HOSPADM

## 2017-04-06 RX ORDER — NEOSTIGMINE METHYLSULFATE 1 MG/ML
INJECTION, SOLUTION INTRAVENOUS
Status: DISCONTINUED | OUTPATIENT
Start: 2017-04-05 | End: 2017-04-06

## 2017-04-06 RX ORDER — GLYCOPYRROLATE 0.2 MG/ML
INJECTION INTRAMUSCULAR; INTRAVENOUS
Status: DISCONTINUED | OUTPATIENT
Start: 2017-04-05 | End: 2017-04-06

## 2017-04-06 RX ORDER — MAGNESIUM SULFATE HEPTAHYDRATE 40 MG/ML
2 INJECTION, SOLUTION INTRAVENOUS ONCE
Status: COMPLETED | OUTPATIENT
Start: 2017-04-06 | End: 2017-04-06

## 2017-04-06 RX ORDER — SODIUM CHLORIDE 0.9 % (FLUSH) 0.9 %
10 SYRINGE (ML) INJECTION
Status: DISCONTINUED | OUTPATIENT
Start: 2017-04-06 | End: 2017-04-19 | Stop reason: HOSPADM

## 2017-04-06 RX ADMIN — ACETAMINOPHEN 650 MG: 325 TABLET ORAL at 06:04

## 2017-04-06 RX ADMIN — Medication 3 ML: at 06:04

## 2017-04-06 RX ADMIN — MAGNESIUM SULFATE IN WATER 2 G: 40 INJECTION, SOLUTION INTRAVENOUS at 08:04

## 2017-04-06 RX ADMIN — FUROSEMIDE 20 MG: 10 INJECTION, SOLUTION INTRAVENOUS at 05:04

## 2017-04-06 RX ADMIN — Medication 3 ML: at 10:04

## 2017-04-06 RX ADMIN — FENTANYL 1 PATCH: 100 PATCH, EXTENDED RELEASE TRANSDERMAL at 06:04

## 2017-04-06 RX ADMIN — Medication: at 01:04

## 2017-04-06 RX ADMIN — PANTOPRAZOLE SODIUM 40 MG: 40 INJECTION, POWDER, FOR SOLUTION INTRAVENOUS at 08:04

## 2017-04-06 RX ADMIN — ASCORBIC ACID, VITAMIN A PALMITATE, CHOLECALCIFEROL, THIAMINE HYDROCHLORIDE, RIBOFLAVIN-5 PHOSPHATE SODIUM, PYRIDOXINE HYDROCHLORIDE, NIACINAMIDE, DEXPANTHENOL, ALPHA-TOCOPHEROL ACETATE, VITAMIN K1, FOLIC ACID, BIOTIN, CYANOCOBALAMIN: 200; 3300; 200; 6; 3.6; 6; 40; 15; 10; 150; 600; 60; 5 INJECTION, SOLUTION INTRAVENOUS at 11:04

## 2017-04-06 RX ADMIN — Medication: at 07:04

## 2017-04-06 RX ADMIN — AMLODIPINE BESYLATE 10 MG: 10 TABLET ORAL at 08:04

## 2017-04-06 RX ADMIN — Medication: at 08:04

## 2017-04-06 RX ADMIN — SOYBEAN OIL 250 ML: 20 INJECTION, SOLUTION INTRAVENOUS at 11:04

## 2017-04-06 RX ADMIN — Medication 3 ML: at 01:04

## 2017-04-06 RX ADMIN — ENOXAPARIN SODIUM 40 MG: 100 INJECTION SUBCUTANEOUS at 05:04

## 2017-04-06 RX ADMIN — ACETAMINOPHEN 650 MG: 325 TABLET ORAL at 01:04

## 2017-04-06 RX ADMIN — ACETAMINOPHEN 650 MG: 325 TABLET ORAL at 10:04

## 2017-04-06 NOTE — ASSESSMENT & PLAN NOTE
- patient with fluid overload on CXR  - will get 2D echo   - given 20mg IV lasix yesterday but still on O2, was not previously on oxygen  - incentive spirometry and PT/OT for atelectasis

## 2017-04-06 NOTE — H&P
"[facility]  Hematology/Oncology  H&P    Patient Name: Nikia Lamas  MRN: [id number]  Admission Date: 3/31/2017  Code Status: Full Code   Attending Provider: Gurpreet Haynes DO, F*  Primary Care Physician: Dane Oliveros MD  Principal Problem:Small bowel obstruction    Subjective:     HPI: 52 yo F with PMHx renal cell carcinoma with mets to liver and bone s/p L nephrectomy 01/2017 presents with 8 d constipation with associated nausea/vomiting intermittently for past 7 days.  She notes difficulty keeping down any food originally but had been using some Ensure supplements.  Had been doing daily PEG 17 g but decided to try a magnesium citrate due to claims to help with abdominal cramping.  She had no relief with this.  Tried lactulose with increase in abd pain and cramping and a couple limited watery bowel movements after using this.  Notes approx 4 lb weight loss over past week or so.  She has been on cabozantinib 60 mg po qd since 01/25/17 with no issues with bowel obstruction since starting chemo.  Has abdominal procedures in [facility] including recent L nephrectomy, 2 C sections, and tubal ligation.       Oncologic History (per Dr. Connelly's note dated 3/24/17):  Nikia Lamas is a 51 y.o. White female, referred by Dr. German, for management of metastatic renal cell carcinoma. Pt reports left mass was found incidentally while being worked up for a persistent dry cough that began in October 2016. She was evaluated with a CXR and a non-contrast chest CT.   12/14/16- CT of the chest without contrast showed multiple pulmonary nodes and a large left renal mass present. The L kidney was not fully evaluated as only the superior aspect was in the CT, but the mass was ~ 11 cm in diameter.   12/19/16- CT CAP reveals "Large heterogeneous left renal mass, consistent with renal cell carcinoma.  This mass does not appear to invade the renal vein or IVC.  Innumerable pulmonary nodules, retroperitoneal " "lymphadenopathy and osseous lytic lesions, concerning for metastatic disease. Indeterminate hepatic lesions, possibly metastatic disease."  1/3/17- Radical left nephrectomy       Oncology Treatment Plan:   [No treatment plan]    Medications:  Continuous Infusions:   sodium chloride 0.9% 100 mL/hr at 17 1344    Amino acid 5% - dextrose 20% (CLINIMIX-E) solution with additives (1L provides 50 gm AA, 200 gm CHO (680 kcal/L dextrose), Na 35, K 30, Mg 5, Ca 4.5, Acetate 80, Cl 39, Phos 15)      hydromorphone in 0.9 % NaCl 6 mg/30 ml       Scheduled Meds:   acetaminophen  650 mg Oral Q8H    amlodipine  10 mg Oral Daily    enoxaparin  40 mg Subcutaneous Q24H    fat emulsion 20%  250 mL Intravenous Daily    pantoprazole  40 mg Intravenous Daily    sodium chloride 0.9%  10 mL Intravenous Q6H    sodium chloride 0.9%  3 mL Intravenous Q8H     PRN Meds:naloxone, ondansetron, promethazine (PHENERGAN) IVPB, Flushing PICC Protocol **AND** sodium chloride 0.9% **AND** sodium chloride 0.9%     Review of patient's allergies indicates:   Allergen Reactions    No known drug allergies         Past Medical History:   Diagnosis Date    Abnormal Pap smear of cervix 10/2015    + HR HPV    Anemia     hx chronic anemia, improved after ablation    Herpes simplex without mention of complication      Past Surgical History:   Procedure Laterality Date     SECTION  ,     DILATION AND CURETTAGE OF UTERUS      ENDOMETRIAL ABLATION  2014    Novasure endometrial ablation with D&C    laparoscopy for endometriosis      TUBAL LIGATION  2006    Laparoscopic     Family History     Problem Relation (Age of Onset)    Alzheimer's disease Maternal Grandmother    Hypertension Mother        Social History Main Topics    Smoking status: Never Smoker    Smokeless tobacco: Never Used    Alcohol use 0.6 oz/week     1 Glasses of wine per week      Comment: social    Drug use: No    Sexual activity: Not Currently "     Partners: Male     Birth control/ protection: Surgical, None      Comment: No partner x 1 year       Review of Systems   Constitutional: Positive for appetite change and unexpected weight change. Negative for chills and fever.   HENT: Negative for congestion, rhinorrhea and sore throat.    Respiratory: Negative for cough and shortness of breath.    Cardiovascular: Negative for chest pain, palpitations and leg swelling.   Gastrointestinal: Positive for abdominal pain, constipation, nausea and vomiting. Negative for abdominal distention and blood in stool.   Genitourinary: Negative for dysuria and hematuria.   Musculoskeletal: Negative for arthralgias and myalgias.   Skin: Negative for rash and wound.   Neurological: Positive for weakness. Negative for dizziness and numbness.   Hematological: Negative for adenopathy. Does not bruise/bleed easily.   Psychiatric/Behavioral: Negative for agitation and confusion.     Objective:     Vital Signs (Most Recent):  Temp: 97.4 °F (36.3 °C) (04/06/17 1105)  Pulse: 108 (04/06/17 1105)  Resp: 16 (04/06/17 1105)  BP: 127/80 (04/06/17 1105)  SpO2: (!) 90 % (04/06/17 1105) Vital Signs (24h Range):  Temp:  [97.4 °F (36.3 °C)-99.2 °F (37.3 °C)] 97.4 °F (36.3 °C)  Pulse:  [] 108  Resp:  [8-28] 16  SpO2:  [87 %-96 %] 90 %  BP: (114-152)/(70-97) 127/80     Weight: 40.8 kg (90 lb)  Body mass index is 15.45 kg/(m^2).  Body surface area is 1.36 meters squared.      Intake/Output Summary (Last 24 hours) at 04/06/17 1336  Last data filed at 04/06/17 0615   Gross per 24 hour   Intake             1961 ml   Output             1900 ml   Net               61 ml       Physical Exam   Constitutional: She appears well-developed.   HENT:   Head: Normocephalic.   Eyes: Pupils are equal, round, and reactive to light.   Neck: Neck supple. No JVD present.   Cardiovascular: Normal rate, regular rhythm and normal heart sounds.    No murmur heard.  Pulmonary/Chest: Effort normal. No respiratory  distress. She has decreased breath sounds. She has no wheezes. She has no rhonchi. She has rales.       Significant Labs:   CBC:     Recent Labs  Lab 04/05/17  0442 04/05/17  1247 04/05/17  1336 04/06/17  0352   WBC 6.35  --  6.59 10.23   HGB 10.0*  --  10.4* 13.0   HCT 31.0* 20* 32.1* 38.7   *  --  336 361*   , CMP:     Recent Labs  Lab 04/05/17  0442 04/05/17  1336 04/06/17  0352   * 133* 139   K 4.3 4.2 4.8    103 104   CO2 21* 18* 24   GLU 69* 115* 103   BUN 12 10 9   CREATININE 0.6 0.7 0.7   CALCIUM 7.3* 8.1* 8.1*   PROT  --  5.7*  --    ALBUMIN  --  3.3*  --    BILITOT  --  0.6  --    ALKPHOS  --  65  --    AST  --  28  --    ALT  --  13  --    ANIONGAP 9 12 11   EGFRNONAA >60.0 >60.0 >60.0    and Coagulation: No results for input(s): INR, APTT in the last 48 hours.    Invalid input(s): PT    Diagnostic Results:  I have reviewed all pertinent imaging results/findings within the past 24 hours.    Assessment/Plan:     * Small bowel obstruction  - adheions and ascites seen during Ex Lap with thickening of peritoneum and omental caking.  - biopsy sent  - Gt tube to gravity at present  - TPN and NPO per gen surg at present    Metastatic renal cell carcinoma  - will need to follow up with Dr. Connelly in clinic after discharge for further management of cancer   - Ex lap revealed omental caking, ascites concerning for carcinomatosis   - G tube placed  - have discussed potential for hospice if further chemo not done  - Palliative care consulted, await further recs    Lung metastases  - patient with fluid overload on CXR  - will get 2D echo   - given 20mg IV lasix yesterday but still on O2, was not previously on oxygen  - incentive spirometry and PT/OT for atelectasis    Dispo: pending post surgical course, await PT/OT recs    Tricia Charles MD  Hematology/Oncology  [facility]    Attending Addendum:  The patient was seen, examined and discussed on rounds with the team.  I agree with the  assessment and plan as outlined above for Nikia Lamas.  I have had a very long discussion with Mrs. Lamas and multiple family members concerning her disease.  It does appear that she has progressive disease and no longer responding to treatment.  She is currently hospitalized and recovering from gastrostomy tube placement.  I will need to clarify whether she has a total obstruction or whether or not she is able to eat.  She is been placed on TPN which I do think is a good idea temporarily in order to maintain her nutritional status.  We have discussed various options for treatment versus no treatment moving forward.  At this point she is not currently a candidate for further treatment due to her performance status, however, if we are able to improve this we may be able to discuss next line of treatment.  I would recommend physical therapy, occupational therapy for consideration of rehabilitation.  I would also discuss with nutrition and ideal TPN regimen and also potential for her to eat.  I will also consult palliative care for discussion of pain management and symptom control.  She also had multiple questions about what it would look like if she did note therapy and went to hospice instead.  We have discussed progressive disease symptoms and what would likely happen and what hospice can provide and I have told her the palliative care service would also be able to further discuss this.  At this point, it would appear that she may be a candidate for further treatment but will defer this decision to Dr. Connelly after she recovers from this hospitalization.  Multiple questions were answered concerning treatment, prognosis, disease process, hospice, vitamin supplementation, alkalinization.    Gurpreet Haynes DO, FACP  Hematology & Oncology  [address] [place] [address]  ph. [phone]  Fax. [phone]

## 2017-04-06 NOTE — PROGRESS NOTES
Pt occasionally desats to 90% on 4L/NC, hears monitor alarm, wakes and takes breaths to correct. Dr. Cao notified and responded with new order for cont pulse ox on floor and high flow nasal cannula. Will follow through and continue to monitor.

## 2017-04-06 NOTE — PROGRESS NOTES
Nursing Transfer Note      4/5/2017     Transfer To: 502    Transfer via stretcher    Transfer with  5L/NC and cardiac monitoring    Transported by RN & PCT    Medicines sent: IVF, PCA    Chart send with patient: Yes    Notified: family in room    Patient reassessed at: to be done by receiving RN (date, time)    Upon arrival to floor: cardiac monitor applied, patient oriented to room, call bell in reach and bed in lowest position

## 2017-04-06 NOTE — SUBJECTIVE & OBJECTIVE
Interval history:  - fentanyl patch 100 mcg/hr was started on 17 in the evening  - today, patient states that she is using the hydromorphone pump less (she used 18mg over past 24 hours). She plans to get out of bed today. She is sipping water. She is using the incentive spirometer.  - She endorses abdominal pain, shortness of breath, generalized weakness.    Oncology Treatment Plan:   [No treatment plan]    Medications:  Continuous Infusions:   sodium chloride 0.9% 100 mL/hr at 17 1344    Amino acid 5% - dextrose 20% (CLINIMIX-E) solution with additives (1L provides 50 gm AA, 200 gm CHO (680 kcal/L dextrose), Na 35, K 30, Mg 5, Ca 4.5, Acetate 80, Cl 39, Phos 15)      hydromorphone in 0.9 % NaCl 6 mg/30 ml       Scheduled Meds:   acetaminophen  650 mg Oral Q8H    amlodipine  10 mg Oral Daily    enoxaparin  40 mg Subcutaneous Q24H    fat emulsion 20%  250 mL Intravenous Daily    pantoprazole  40 mg Intravenous Daily    sodium chloride 0.9%  10 mL Intravenous Q6H    sodium chloride 0.9%  3 mL Intravenous Q8H     PRN Meds:naloxone, ondansetron, promethazine (PHENERGAN) IVPB, Flushing PICC Protocol **AND** sodium chloride 0.9% **AND** sodium chloride 0.9%     Review of patient's allergies indicates:   Allergen Reactions    No known drug allergies         Past Medical History:   Diagnosis Date    Abnormal Pap smear of cervix 10/2015    + HR HPV    Anemia     hx chronic anemia, improved after ablation    Herpes simplex without mention of complication      Past Surgical History:   Procedure Laterality Date     SECTION  ,     DILATION AND CURETTAGE OF UTERUS      ENDOMETRIAL ABLATION  2014    Novasure endometrial ablation with D&C    laparoscopy for endometriosis      TUBAL LIGATION  2006    Laparoscopic     Family History     Problem Relation (Age of Onset)    Alzheimer's disease Maternal Grandmother    Hypertension Mother        Social History Main Topics    Smoking  status: Never Smoker    Smokeless tobacco: Never Used    Alcohol use 0.6 oz/week     1 Glasses of wine per week      Comment: social    Drug use: No    Sexual activity: Not Currently     Partners: Male     Birth control/ protection: Surgical, None      Comment: No partner x 1 year       Review of Systems   Constitutional: Positive for appetite change and unexpected weight change. Negative for chills and fever.   HENT: Negative for congestion, rhinorrhea and sore throat.    Respiratory: Negative for cough and shortness of breath.    Cardiovascular: Negative for chest pain, palpitations and leg swelling.   Gastrointestinal: Positive for abdominal pain, constipation, nausea and vomiting. Negative for abdominal distention and blood in stool.   Genitourinary: Negative for dysuria and hematuria.   Musculoskeletal: Negative for arthralgias and myalgias.   Skin: Negative for rash and wound.   Neurological: Positive for weakness. Negative for dizziness and numbness.   Hematological: Negative for adenopathy. Does not bruise/bleed easily.   Psychiatric/Behavioral: Negative for agitation and confusion.     Objective:     Vital Signs (Most Recent):  Temp: 97.4 °F (36.3 °C) (04/06/17 1105)  Pulse: 108 (04/06/17 1105)  Resp: 16 (04/06/17 1105)  BP: 127/80 (04/06/17 1105)  SpO2: (!) 90 % (04/06/17 1105) Vital Signs (24h Range):  Temp:  [97.4 °F (36.3 °C)-99.2 °F (37.3 °C)] 97.4 °F (36.3 °C)  Pulse:  [] 108  Resp:  [8-28] 16  SpO2:  [87 %-96 %] 90 %  BP: (114-152)/(70-97) 127/80     Weight: 40.8 kg (90 lb)  Body mass index is 15.45 kg/(m^2).  Body surface area is 1.36 meters squared.      Intake/Output Summary (Last 24 hours) at 04/06/17 1336  Last data filed at 04/06/17 0615   Gross per 24 hour   Intake             1961 ml   Output             1900 ml   Net               61 ml       Physical Exam   Constitutional: She is oriented to person, place, and time. She appears well-developed.   Fatigued, malnourished   HENT:    Head: Normocephalic.   Eyes: Pupils are equal, round, and reactive to light.   Neck: Neck supple. No JVD present.   Cardiovascular: Normal rate, regular rhythm and normal heart sounds.    No murmur heard.  Pulmonary/Chest: Effort normal. No respiratory distress. She has decreased breath sounds. She has no wheezes. She has no rhonchi. She has rales.   Abdominal:   middle vertical abdominal incision with staples. Venting gastrostomy tube noted in left abdominal quadrant. Hypoactive bowel sounds.   Musculoskeletal: She exhibits no edema.   Neurological: She is oriented to person, place, and time.   Skin: Skin is warm.   Psychiatric: She has a normal mood and affect.     Significant Labs:   Labs have been reviewed.    Diagnostic Results:  I have reviewed all pertinent imaging results/findings within the past 24 hours.

## 2017-04-06 NOTE — CONSULTS
Palliative Care Acknowledgement of Consult - .date    Consult received.  Will touch base with team prior to seeing patient.  Full consult to follow.    Thank you for allowing us to be a part of the care of this patient.    Viv Garcia LCSW, [facility] Hospital[facility]-SW

## 2017-04-06 NOTE — ADDENDUM NOTE
Addendum  created 04/06/17 1519 by Stephanie Sellers, CRNA    Anesthesia Intra Meds edited, Orders acknowledged in Narrator

## 2017-04-06 NOTE — PROCEDURES
"Nikia Lamas is a 51 y.o. female patient.    Temp: 98 °F (36.7 °C) (04/06/17 0705)  Pulse: 102 (04/06/17 0705)  Resp: 16 (04/06/17 0705)  BP: 121/82 (04/06/17 0705)  SpO2: (!) 94 % (04/06/17 0705)  Weight: 40.8 kg (90 lb) (04/05/17 1015)  Height: 5' 4" (162.6 cm) (04/05/17 1015)    PICC  Date/Time: 4/6/2017 9:54 AM  Performed by: JERRI PEREZ  Consent Done: Yes  Time out: Immediately prior to procedure a time out was called to verify the correct patient, procedure, equipment, support staff and site/side marked as required  Indications: med administration and vascular access  Anesthesia: local infiltration  Local anesthetic: lidocaine 1% without epinephrine  Anesthetic Total (mL): 2  Preparation: skin prepped with ChloraPrep  Skin prep agent dried: skin prep agent completely dried prior to procedure  Sterile barriers: all five maximum sterile barriers used - cap, mask, sterile gown, sterile gloves, and large sterile sheet  Hand hygiene: hand hygiene performed prior to central venous catheter insertion  Location details: right brachial  Catheter type: double lumen  Catheter size: 5 Fr  Catheter Length: 30cm    Ultrasound guidance: yes  Vessel Caliber: medium and patent, compressibility normal  Needle advanced into vessel with real time Ultrasound guidance.  Guidewire confirmed in vessel.  Image recorded and saved.  Sterile sheath used.  Number of attempts: 1  Post-procedure: blood return through all ports  Technical procedures used: 3CG  Specimens: No  Implants: No  Assessment: placement verified by x-ray  Complications: none        Jerri Perez  4/6/2017  "

## 2017-04-06 NOTE — PLAN OF CARE
Problem: Patient Care Overview  Goal: Plan of Care Review  Outcome: Ongoing (interventions implemented as appropriate)  Recommendations  1. To better meet needs, recommend modifying TPN to Clinimix E 5/20 at 40 mL/hr with 250 mL 20% lipids - to provide 1345 kcal/day, 48 g protein/day, and 1210 mL fluid/day (GIR = 3.3 mg/kg/min).   2. As medically appropriate, recommend advancing PO diet as tolerated.      RD to monitor. Full assessment completed. See RD Consult Note 4/6/17.

## 2017-04-06 NOTE — ANESTHESIA RELEASE NOTE
"Anesthesia Release from PACU Note    Patient: Nikia Lamas    Procedure(s) Performed: Procedure(s) (LRB):  LAPAROTOMY-EXPLORATORY (N/A)  GASTROSTOMY (N/A)    Anesthesia type: general    Post pain: Adequate analgesia    Post assessment: no apparent anesthetic complications, tolerated procedure well and no evidence of recall    Last Vitals:   Visit Vitals    /77    Pulse 102    Temp 36.5 °C (97.7 °F) (Axillary)    Resp (!) 8    Ht 5' 4" (1.626 m)    Wt 40.8 kg (90 lb)    LMP 01/03/2017    SpO2 (!) 92%    Breastfeeding No    BMI 15.45 kg/m2       Post vital signs: stable    Level of consciousness: awake, alert  and oriented    Nausea/Vomiting: no nausea/no vomiting    Complications: none    Airway Patency: patent    Respiratory: nasal cannula    Cardiovascular: stable and blood pressure at baseline    Hydration: euvolemic  "

## 2017-04-06 NOTE — SUBJECTIVE & OBJECTIVE
Oncology Treatment Plan:   [No treatment plan]    Medications:  Continuous Infusions:   sodium chloride 0.9% 100 mL/hr at 17 1344    Amino acid 5% - dextrose 15% (CLINIMIX-E) solution with additives (1L  provides 510 kcal/L dextrose, with 50 gm AA, 150 gm CHO, Na 35, K 30, Mg 5, Ca 4.5, Acetate 80, Cl 39, Phos 15)      hydromorphone in 0.9 % NaCl 6 mg/30 ml       Scheduled Meds:   acetaminophen  650 mg Oral Q8H    amlodipine  10 mg Oral Daily    enoxaparin  40 mg Subcutaneous Q24H    fat emulsion 20%  250 mL Intravenous Daily    pantoprazole  40 mg Intravenous Daily    sodium chloride 0.9%  10 mL Intravenous Q6H    sodium chloride 0.9%  3 mL Intravenous Q8H     PRN Meds:naloxone, ondansetron, promethazine (PHENERGAN) IVPB, Flushing PICC Protocol **AND** sodium chloride 0.9% **AND** sodium chloride 0.9%     Review of patient's allergies indicates:   Allergen Reactions    No known drug allergies         Past Medical History:   Diagnosis Date    Abnormal Pap smear of cervix 10/2015    + HR HPV    Anemia     hx chronic anemia, improved after ablation    Herpes simplex without mention of complication      Past Surgical History:   Procedure Laterality Date     SECTION  ,     DILATION AND CURETTAGE OF UTERUS      ENDOMETRIAL ABLATION  2014    Novasure endometrial ablation with D&C    laparoscopy for endometriosis      TUBAL LIGATION  2006    Laparoscopic     Family History     Problem Relation (Age of Onset)    Alzheimer's disease Maternal Grandmother    Hypertension Mother        Social History Main Topics    Smoking status: Never Smoker    Smokeless tobacco: Never Used    Alcohol use 0.6 oz/week     1 Glasses of wine per week      Comment: social    Drug use: No    Sexual activity: Not Currently     Partners: Male     Birth control/ protection: Surgical, None      Comment: No partner x 1 year       Review of Systems   Constitutional: Positive for appetite change and  unexpected weight change. Negative for chills and fever.   HENT: Negative for congestion, rhinorrhea and sore throat.    Respiratory: Negative for cough and shortness of breath.    Cardiovascular: Negative for chest pain, palpitations and leg swelling.   Gastrointestinal: Positive for abdominal pain, constipation, nausea and vomiting. Negative for abdominal distention and blood in stool.   Genitourinary: Negative for dysuria and hematuria.   Musculoskeletal: Negative for arthralgias and myalgias.   Skin: Negative for rash and wound.   Neurological: Positive for weakness. Negative for dizziness and numbness.   Hematological: Negative for adenopathy. Does not bruise/bleed easily.   Psychiatric/Behavioral: Negative for agitation and confusion.     Objective:     Vital Signs (Most Recent):  Temp: 97.4 °F (36.3 °C) (04/06/17 1105)  Pulse: 108 (04/06/17 1105)  Resp: 16 (04/06/17 1105)  BP: 127/80 (04/06/17 1105)  SpO2: (!) 90 % (04/06/17 1105) Vital Signs (24h Range):  Temp:  [97.4 °F (36.3 °C)-99.2 °F (37.3 °C)] 97.4 °F (36.3 °C)  Pulse:  [] 108  Resp:  [8-28] 16  SpO2:  [87 %-96 %] 90 %  BP: (114-152)/(70-97) 127/80     Weight: 40.8 kg (90 lb)  Body mass index is 15.45 kg/(m^2).  Body surface area is 1.36 meters squared.      Intake/Output Summary (Last 24 hours) at 04/06/17 1303  Last data filed at 04/06/17 0615   Gross per 24 hour   Intake             5066 ml   Output             1900 ml   Net             3166 ml       Physical Exam   Constitutional: She appears well-developed.   HENT:   Head: Normocephalic.   Eyes: Pupils are equal, round, and reactive to light.   Neck: Neck supple. No JVD present.   Cardiovascular: Normal rate, regular rhythm and normal heart sounds.    No murmur heard.  Pulmonary/Chest: Effort normal. No respiratory distress. She has no decreased breath sounds. She has no wheezes. She has no rhonchi. She has rales.       Significant Labs:   CBC:   Recent Labs  Lab 04/05/17  9515  04/05/17  1247 04/05/17  1336 04/06/17  0352   WBC 6.35  --  6.59 10.23   HGB 10.0*  --  10.4* 13.0   HCT 31.0* 20* 32.1* 38.7   *  --  336 361*   , CMP:   Recent Labs  Lab 04/05/17  0442 04/05/17  1336 04/06/17  0352   * 133* 139   K 4.3 4.2 4.8    103 104   CO2 21* 18* 24   GLU 69* 115* 103   BUN 12 10 9   CREATININE 0.6 0.7 0.7   CALCIUM 7.3* 8.1* 8.1*   PROT  --  5.7*  --    ALBUMIN  --  3.3*  --    BILITOT  --  0.6  --    ALKPHOS  --  65  --    AST  --  28  --    ALT  --  13  --    ANIONGAP 9 12 11   EGFRNONAA >60.0 >60.0 >60.0    and Coagulation: No results for input(s): INR, APTT in the last 48 hours.    Invalid input(s): PT    Diagnostic Results:  I have reviewed all pertinent imaging results/findings within the past 24 hours.

## 2017-04-06 NOTE — ASSESSMENT & PLAN NOTE
- adheions and ascites seen during Ex Lap with thickening of peritoneum and omental caking.  - biopsy sent  - Gt tube to gravity at present  - TPN and NPO per gen surg at present

## 2017-04-06 NOTE — CONSULTS
[facility]  Adult Nutrition  Consult Note    SUMMARY     Recommendations  Recommendation/Intervention:   1. To better meet needs, recommend modifying TPN to Clinimix E 5/20 at 40 mL/hr with 250 mL 20% lipids - to provide 1345 kcal/day, 48 g protein/day, and 1210 mL fluid/day (GIR = 3.3 mg/kg/min).   2. As medically appropriate, recommend advancing PO diet as tolerated.   RD to monitor.    Goals: Patient to receive nutrition within 48 hours  Nutrition Goal Status: new  Communication of RD Recs: discussed on rounds    Reason for Assessment  Reason for Assessment: physician consult  Diagnosis:  (SBO)  Relevent Medical History: metastatic renal cell carcinoma s/p radical L nephrectomy 1/30/17   Interdisciplinary Rounds: attended   General Information Comments: POD#1 s/p ex lap and G-tube placement. Patient NPO and TPN ordered but not started yet. LBM 4/1. Nutrition D/C Planning: unable to determine at this time.    Nutrition Prescription Ordered  Current Diet Order: NPO  Nutrition Order Comments: TPN order but not yet started  Current Nutrition Support Formula Ordered: Clinimax E 5/15 (+ lipids)  Current Nutrition Support Rate Ordered: 60 (ml)  Current Nutrition Support Frequency Ordered: mL/hr      Evaluation of Received Nutrients/Fluid Intake  Parenteral Calories (kcal): 1522  Parenteral Protein (gm): 72  Parenteral Fluid (mL): 1690  Energy Calories Required: exceed needs  % Kcal Needs: 115  Protein Required: exceed needs  % Protein Needs: 160   IV Fluid (mL): 2400   Fluid Required: exceed needs  Comments: GIR = 3.7 mg/kg/min      Nutrition Risk Screen   Nutrition Risk Screen: reduced oral intake over the last month    Nutrition/Diet History  Patient Reported Diet/Restrictions/Preferences: general  Typical Food/Fluid Intake: Patient unable to hold down food or liquids PTA per MD note.  Food Preferences: No cultural or Evangelical needs identified at this time.   Factors Affecting Nutritional Intake:  NPO, abdominal pain, nausea/vomiting     Labs/Tests/Procedures/Meds   Pertinent Labs Reviewed: reviewed  Pertinent Labs Comments: Ca 8.1, Mag 1.5, Alb 3.3  Pertinent Medications Reviewed: reviewed  Pertinent Medications Comments: furosemide, pantoprazole, IVF    Physical Findings  Overall Physical Appearance: underweight, on oxygen therapy  Tubes:  (none)  Oral/Mouth Cavity: WDL  Skin:  (incision)    Anthropometrics   Height (inches): 64.02 in  Weight Method: Stated  Weight (kg): 40.8 kg   Ideal Body Weight (IBW), Female: 120.1 lb   % Ideal Body Weight, Female (lb): 74.9 lb  BMI (kg/m2): 15.43  BMI Grade: less than 16 protein-energy malnutrition grade III     Estimated/Assessed Needs  Weight Used For Calorie Calculations: 40.8 kg (89 lb 15.2 oz)   Height (cm): 162.6 cm   Energy Need Method: [place]-St Jeor (1322 kcal/day (1.3))  RMR ([place]-St. Jeor Equation): 1016.84   Weight Used For Protein Calculations: 40.8 kg (89 lb 15.2 oz)  Protein Requirements: 45-53 g/day (1.1-1.3 g/kg)  Fluid Need Method: RDA Method (1 mL/kcal or per MD)     Monitor and Evaluation  Food and Nutrient Intake: energy intake, parenteral nutrition intake  Food and Nutrient Adminstration: diet order, enteral and parenteral nutrition administration   Physical Activity and Function: nutrition-related ADLs and IADLs  Anthropometric Measurements: weight, weight change  Biochemical Data, Medical Tests and Procedures: electrolyte and renal panel, gastrointestinal profile, glucose/endocrine profile, lipid profile  Nutrition-Focused Physical Findings: overall appearance    Nutrition Risk  Level of Risk:  (2x/week)    Nutrition Follow-Up  RD Follow-up?: Yes    Nutrition Diagnosis  Problem: Inadequate energy intake  Etiology: decreased ability to consume sufficient energy  Signs/Symptoms: NPO with no alternate means of nutrition  Nutrition Diagnosis Status: New

## 2017-04-06 NOTE — PT/OT/SLP PROGRESS
Physical Therapy      Nikia Lamas  MRN: [id number]    PT orders received and acknowledged.  Rn notified therapist that patient received new CA diagnosis this morning.  Therapist attempted initial eval.  Patient in room with multiple family members present.  She politely informed the therapist that she would like to discuss her diagnosis with her family and the oncologist and make some decisions today before attempting therapy.  Patient requested therapy to return tomorrow. Will follow-up tomorrow.    Zoe Friedman, PT   3/6/2017

## 2017-04-06 NOTE — ASSESSMENT & PLAN NOTE
- will need to follow up with Dr. Connelly in clinic after discharge for further management of cancer   - Ex lap revealed omental caking, ascites concerning for carcinomatosis   - G tube placed  - have discussed potential for hospice if further chemo not done  - Palliative care consulted, await further recs

## 2017-04-06 NOTE — ANESTHESIA POSTPROCEDURE EVALUATION
"Anesthesia Post Evaluation    Patient: Nikia Lamas    Procedure(s) Performed: Procedure(s) (LRB):  LAPAROTOMY-EXPLORATORY (N/A)  GASTROSTOMY (N/A)    Final Anesthesia Type: general  Patient location during evaluation: PACU  Patient participation: Yes- Able to Participate  Level of consciousness: awake and alert and oriented  Post-procedure vital signs: reviewed and stable  Pain management: adequate  Airway patency: patent  PONV status at discharge: No PONV  Anesthetic complications: no      Cardiovascular status: blood pressure returned to baseline, hemodynamically stable and stable  Respiratory status: spontaneous ventilation and nasal cannula  Hydration status: euvolemic  Follow-up not needed.        Visit Vitals    /77    Pulse 102    Temp 36.5 °C (97.7 °F) (Axillary)    Resp (!) 8    Ht 5' 4" (1.626 m)    Wt 40.8 kg (90 lb)    LMP 01/03/2017    SpO2 (!) 92%    Breastfeeding No    BMI 15.45 kg/m2       Pain/Christian Score: Pain Assessment Performed: Yes (4/5/2017  7:15 PM)  Presence of Pain: complains of pain/discomfort (4/5/2017  7:15 PM)  Pain Rating Prior to Med Admin: 5 (4/5/2017  7:37 PM)  Pain Rating Post Med Admin: 6 (4/5/2017  2:30 PM)  Christian Score: 9 (4/5/2017  7:15 PM)      "

## 2017-04-06 NOTE — CONSULTS
Double lumen PICC placed in right brachial vein, 30cm in length, 0cm exposed with arm circumference 23 cm. Lot# [id number]

## 2017-04-06 NOTE — PLAN OF CARE
Problem: Patient Care Overview  Goal: Plan of Care Review  Outcome: Ongoing (interventions implemented as appropriate)  Patient resting in bed comfortably. IV intact and infusing with no signs of irritation. Fall precautions maintained with no falls noted. Call light in reach bed locked and in lowest position. Non-skid socks on while out of bed. Patient instructed to call for assistance. Skin integrity maintained as patient is assisted with frequent positioning. C/o pain managed with PCA. No other complaints or concerns. Progressing towards goals. Will continue to monitor and follow plan of care.

## 2017-04-06 NOTE — PROGRESS NOTES
Progress Note  General Surgery    Admit Date: 3/31/2017  Post-operative Day: 1 Day Post-Op  Hospital Day: 7    SUBJECTIVE:     Follow-up For:  Procedure(s) (LRB):  LAPAROTOMY-EXPLORATORY (N/A)  GASTROSTOMY (N/A)    Taken to OR yesterday with omental caking, large amount of ascites found. Biopsies taken. And G tube placed. Some emesis after the OR yesterday but pain and nausea controlled this am. Good uop. Vss. Afebrile. g tube to gravity     Scheduled Meds:   acetaminophen  650 mg Oral Q8H    amlodipine  10 mg Oral Daily    enoxaparin  40 mg Subcutaneous Q24H    fat emulsion 20%  250 mL Intravenous Daily    Lactobacillus rhamnosus GG  1 capsule Oral Daily    pantoprazole  40 mg Intravenous Daily    sodium chloride 0.9%  3 mL Intravenous Q8H     Continuous Infusions:   sodium chloride 0.9% 100 mL/hr at 04/05/17 1344    Amino acid 5% - dextrose 15% (CLINIMIX-E) solution with additives (1L  provides 510 kcal/L dextrose, with 50 gm AA, 150 gm CHO, Na 35, K 30, Mg 5, Ca 4.5, Acetate 80, Cl 39, Phos 15)      hydromorphone in 0.9 % NaCl 6 mg/30 ml       PRN Meds:naloxone, ondansetron, promethazine (PHENERGAN) IVPB    Review of patient's allergies indicates:   Allergen Reactions    No known drug allergies        Review of Systems  See prior notes for additional details not listed in HPI      OBJECTIVE:     Vital Signs (Most Recent)  Temp: 98.3 °F (36.8 °C) (04/05/17 2130)  Pulse: 104 (04/06/17 0700)  Resp: 12 (04/05/17 2130)  BP: 129/75 (04/05/17 2130)  SpO2: (!) 93 % (04/06/17 0700)    Vital Signs Range (Last 24H):  Temp:  [97.6 °F (36.4 °C)-98.8 °F (37.1 °C)]   Pulse:  []   Resp:  [8-28]   BP: (114-152)/(70-97)   SpO2:  [87 %-96 %]     I & O (Last 24H):    Intake/Output Summary (Last 24 hours) at 04/06/17 0732  Last data filed at 04/05/17 2042   Gross per 24 hour   Intake             4825 ml   Output             2900 ml   Net             1925 ml       Physical Exam:  General: NAD,   Neuro: aaox4, no  obvious focal deficit   Respiratory: resps even unlabored  Cardiac: cap refill <2 sec, RRR  Abdomen: scaphoid abdomen, milo tender, midline incision. Decreased BS.  G tube to gravity.   Extremities: Warm dry and intact      Lab Results   Component Value Date    WBC 10.23 04/06/2017    HGB 13.0 04/06/2017    HCT 38.7 04/06/2017    MCV 79 (L) 04/06/2017     (H) 04/06/2017     Lab Results   Component Value Date    CREATININE 0.7 04/06/2017    BUN 9 04/06/2017     04/06/2017    K 4.8 04/06/2017     04/06/2017    CO2 24 04/06/2017    CALCIUM 8.1 (L) 04/06/2017    PHOS 3.2 04/06/2017    MG 1.5 (L) 04/06/2017         Diagnostic Results:  Labs: Reviewed    ASSESSMENT/PLAN:     Nikia Lamas is a 51 y.o. female with metastatic renal cell carcinoma s/p left radical nephrectomy currently on cabozantinib, presenting with pSBO POD1 ex lap, omental caking, ascites concerning for carcinomatosis and G tube placement    - f/u biopsies   - G tube to gravity   - Ambulate  - NPO. IVF - NS @ 100   - dilaudid PCA. Tylenol scheduled   - will place PICC for TPN and lipids  - protonix daily  - Home amlodipine  - OOBTC/Ambulate  - Strict I & O's  - DVT prophylaxis  - Holding cabozantinib per hematology recs  - cr wnl, good UOP. Will d/c bobby   - hypomagnesemia - replace   - heme/onc re-consulted. Will speak with them about possible transfer to their service now that she is adequate decompressed with G tube

## 2017-04-06 NOTE — NURSING
Patient refused removal of bobby catheter despite education. Patient wishes to be seen by oncology first before removal. MD hoover. Will continue to monitor.

## 2017-04-06 NOTE — NURSING
Patient off floor to echo via wheelchair with RN. Patient sent with 8L high flow nasal cannula, G tube to bobby bag,  bobby bag, and PCA.

## 2017-04-07 PROBLEM — J96.01 ACUTE RESPIRATORY FAILURE WITH HYPOXIA: Status: ACTIVE | Noted: 2017-04-07

## 2017-04-07 PROBLEM — E43 PROTEIN-CALORIE MALNUTRITION, SEVERE: Status: ACTIVE | Noted: 2017-04-07

## 2017-04-07 LAB
ANION GAP SERPL CALC-SCNC: 5 MMOL/L
BASOPHILS # BLD AUTO: 0.01 K/UL
BASOPHILS NFR BLD: 0.1 %
BUN SERPL-MCNC: 11 MG/DL
CALCIUM SERPL-MCNC: 7.8 MG/DL
CHLORIDE SERPL-SCNC: 101 MMOL/L
CO2 SERPL-SCNC: 29 MMOL/L
CREAT SERPL-MCNC: 0.6 MG/DL
DIFFERENTIAL METHOD: ABNORMAL
EOSINOPHIL # BLD AUTO: 0.8 K/UL
EOSINOPHIL NFR BLD: 9.4 %
ERYTHROCYTE [DISTWIDTH] IN BLOOD BY AUTOMATED COUNT: 22.6 %
EST. GFR  (AFRICAN AMERICAN): >60 ML/MIN/1.73 M^2
EST. GFR  (NON AFRICAN AMERICAN): >60 ML/MIN/1.73 M^2
GLUCOSE SERPL-MCNC: 152 MG/DL
HCT VFR BLD AUTO: 37.4 %
HGB BLD-MCNC: 12.4 G/DL
LYMPHOCYTES # BLD AUTO: 0.6 K/UL
LYMPHOCYTES NFR BLD: 6.7 %
MAGNESIUM SERPL-MCNC: 2.1 MG/DL
MCH RBC QN AUTO: 26.8 PG
MCHC RBC AUTO-ENTMCNC: 33.2 %
MCV RBC AUTO: 81 FL
MONOCYTES # BLD AUTO: 0.8 K/UL
MONOCYTES NFR BLD: 8.9 %
NEUTROPHILS # BLD AUTO: 6.5 K/UL
NEUTROPHILS NFR BLD: 74.7 %
PHOSPHATE SERPL-MCNC: 1.5 MG/DL
PLATELET # BLD AUTO: 303 K/UL
PMV BLD AUTO: 9.7 FL
POTASSIUM SERPL-SCNC: 3.8 MMOL/L
PREALB SERPL-MCNC: 4 MG/DL
RBC # BLD AUTO: 4.62 M/UL
SODIUM SERPL-SCNC: 135 MMOL/L
WBC # BLD AUTO: 8.66 K/UL

## 2017-04-07 PROCEDURE — 25000003 PHARM REV CODE 250: Performed by: STUDENT IN AN ORGANIZED HEALTH CARE EDUCATION/TRAINING PROGRAM

## 2017-04-07 PROCEDURE — 63600175 PHARM REV CODE 636 W HCPCS: Performed by: STUDENT IN AN ORGANIZED HEALTH CARE EDUCATION/TRAINING PROGRAM

## 2017-04-07 PROCEDURE — 25000003 PHARM REV CODE 250

## 2017-04-07 PROCEDURE — 83735 ASSAY OF MAGNESIUM: CPT

## 2017-04-07 PROCEDURE — 99233 SBSQ HOSP IP/OBS HIGH 50: CPT | Mod: ,,, | Performed by: INTERNAL MEDICINE

## 2017-04-07 PROCEDURE — 84100 ASSAY OF PHOSPHORUS: CPT

## 2017-04-07 PROCEDURE — 80048 BASIC METABOLIC PNL TOTAL CA: CPT

## 2017-04-07 PROCEDURE — 63600175 PHARM REV CODE 636 W HCPCS: Performed by: INTERNAL MEDICINE

## 2017-04-07 PROCEDURE — C9113 INJ PANTOPRAZOLE SODIUM, VIA: HCPCS | Performed by: PHYSICIAN ASSISTANT

## 2017-04-07 PROCEDURE — 85025 COMPLETE CBC W/AUTO DIFF WBC: CPT

## 2017-04-07 PROCEDURE — 63600175 PHARM REV CODE 636 W HCPCS: Performed by: SURGERY

## 2017-04-07 PROCEDURE — 97530 THERAPEUTIC ACTIVITIES: CPT

## 2017-04-07 PROCEDURE — 97535 SELF CARE MNGMENT TRAINING: CPT

## 2017-04-07 PROCEDURE — 84134 ASSAY OF PREALBUMIN: CPT

## 2017-04-07 PROCEDURE — 63600175 PHARM REV CODE 636 W HCPCS: Performed by: PHYSICIAN ASSISTANT

## 2017-04-07 PROCEDURE — 97161 PT EVAL LOW COMPLEX 20 MIN: CPT

## 2017-04-07 PROCEDURE — 25000003 PHARM REV CODE 250: Performed by: INTERNAL MEDICINE

## 2017-04-07 PROCEDURE — 97165 OT EVAL LOW COMPLEX 30 MIN: CPT

## 2017-04-07 PROCEDURE — 99356 PR PROLONGED SERV,INPATIENT,1ST HR: CPT | Mod: ,,, | Performed by: INTERNAL MEDICINE

## 2017-04-07 PROCEDURE — 11000001 HC ACUTE MED/SURG PRIVATE ROOM

## 2017-04-07 RX ADMIN — Medication 3 ML: at 10:04

## 2017-04-07 RX ADMIN — Medication 10 ML: at 12:04

## 2017-04-07 RX ADMIN — ACETAMINOPHEN 650 MG: 325 TABLET ORAL at 03:04

## 2017-04-07 RX ADMIN — PANTOPRAZOLE SODIUM 40 MG: 40 INJECTION, POWDER, FOR SOLUTION INTRAVENOUS at 10:04

## 2017-04-07 RX ADMIN — ACYCLOVIR SODIUM 410 MG: 50 INJECTION, SOLUTION INTRAVENOUS at 07:04

## 2017-04-07 RX ADMIN — AMLODIPINE BESYLATE 10 MG: 10 TABLET ORAL at 10:04

## 2017-04-07 RX ADMIN — ACYCLOVIR SODIUM 410 MG: 50 INJECTION, SOLUTION INTRAVENOUS at 12:04

## 2017-04-07 RX ADMIN — ACETAMINOPHEN 650 MG: 325 TABLET ORAL at 06:04

## 2017-04-07 RX ADMIN — ENOXAPARIN SODIUM 40 MG: 100 INJECTION SUBCUTANEOUS at 06:04

## 2017-04-07 RX ADMIN — ACETAMINOPHEN 650 MG: 325 TABLET ORAL at 10:04

## 2017-04-07 RX ADMIN — Medication: at 06:04

## 2017-04-07 RX ADMIN — Medication: at 08:04

## 2017-04-07 NOTE — PLAN OF CARE
Problem: Occupational Therapy Goal  Goal: Occupational Therapy Goal  Outcome: Outcome(s) achieved Date Met:  04/07/17  Pt was agreeable to OT evaluation.  Pt's PLOF was I with mobility and self care skills. Pt performed ADLs and func mobility with mod I for increased time to complete tasks as a result of pain.  As pt is near baseline and will have support in place upon discharge, OT services are not needed at this time.  OT recommends a BSC for DME for safe transfers and to facilitate toileting at discharge due to pt's increased pain.      Amy Pedraza, OT  4/7/2017

## 2017-04-07 NOTE — PROGRESS NOTES
"[facility]  Hematology/Oncology  Progress Note    Patient Name: Nikia Lamas  Admission Date: 3/31/2017  Hospital Length of Stay: 7 days  Code Status: Full Code     Subjective:     HPI:  50 yo F with PMHx renal cell carcinoma with mets to liver and bone s/p L nephrectomy 01/2017 presents with 8 d constipation with associated nausea/vomiting intermittently for past 7 days.  She notes difficulty keeping down any food originally but had been using some Ensure supplements.  Had been doing daily PEG 17 g but decided to try a magnesium citrate due to claims to help with abdominal cramping.  She had no relief with this.  Tried lactulose with increase in abd pain and cramping and a couple limited watery bowel movements after using this.  Notes approx 4 lb weight loss over past week or so.  She has been on cabozantinib 60 mg po qd since 01/25/17 with no issues with bowel obstruction since starting chemo.  Has abdominal procedures in [facility] including recent L nephrectomy, 2 C sections, and tubal ligation.       Oncologic History (per Dr. Connelly's note dated 3/24/17):  Nikia Lamas is a 51 y.o. White female, referred by Dr. German, for management of metastatic renal cell carcinoma. Pt reports left mass was found incidentally while being worked up for a persistent dry cough that began in October 2016. She was evaluated with a CXR and a non-contrast chest CT.   12/14/16- CT of the chest without contrast showed multiple pulmonary nodes and a large left renal mass present. The L kidney was not fully evaluated as only the superior aspect was in the CT, but the mass was ~ 11 cm in diameter.   12/19/16- CT CAP reveals "Large heterogeneous left renal mass, consistent with renal cell carcinoma.  This mass does not appear to invade the renal vein or IVC.  Innumerable pulmonary nodules, retroperitoneal lymphadenopathy and osseous lytic lesions, concerning for metastatic disease. Indeterminate hepatic lesions, " "possibly metastatic disease."  1/3/17- Radical left nephrectomy      Interval history:  - fentanyl patch 100 mcg/hr was started on 17 in the evening  - today, patient states that she is using the hydromorphone pump less (she used 18mg over past 24 hours). She plans to get out of bed today. She is sipping water. She is using the incentive spirometer.  - She endorses abdominal pain, shortness of breath, generalized weakness.    Oncology Treatment Plan:   [No treatment plan]    Medications:  Continuous Infusions:   sodium chloride 0.9% 100 mL/hr at 17 1344    Amino acid 5% - dextrose 20% (CLINIMIX-E) solution with additives (1L provides 50 gm AA, 200 gm CHO (680 kcal/L dextrose), Na 35, K 30, Mg 5, Ca 4.5, Acetate 80, Cl 39, Phos 15)      hydromorphone in 0.9 % NaCl 6 mg/30 ml       Scheduled Meds:   acetaminophen  650 mg Oral Q8H    amlodipine  10 mg Oral Daily    enoxaparin  40 mg Subcutaneous Q24H    fat emulsion 20%  250 mL Intravenous Daily    pantoprazole  40 mg Intravenous Daily    sodium chloride 0.9%  10 mL Intravenous Q6H    sodium chloride 0.9%  3 mL Intravenous Q8H     PRN Meds:naloxone, ondansetron, promethazine (PHENERGAN) IVPB, Flushing PICC Protocol **AND** sodium chloride 0.9% **AND** sodium chloride 0.9%     Review of patient's allergies indicates:   Allergen Reactions    No known drug allergies         Past Medical History:   Diagnosis Date    Abnormal Pap smear of cervix 10/2015    + HR HPV    Anemia     hx chronic anemia, improved after ablation    Herpes simplex without mention of complication      Past Surgical History:   Procedure Laterality Date     SECTION  ,     DILATION AND CURETTAGE OF UTERUS      ENDOMETRIAL ABLATION  2014    Novasure endometrial ablation with D&C    laparoscopy for endometriosis      TUBAL LIGATION  2006    Laparoscopic     Family History     Problem Relation (Age of Onset)    Alzheimer's disease Maternal Grandmother "    Hypertension Mother        Social History Main Topics    Smoking status: Never Smoker    Smokeless tobacco: Never Used    Alcohol use 0.6 oz/week     1 Glasses of wine per week      Comment: social    Drug use: No    Sexual activity: Not Currently     Partners: Male     Birth control/ protection: Surgical, None      Comment: No partner x 1 year       Review of Systems   Constitutional: Positive for appetite change and unexpected weight change. Negative for chills and fever.   HENT: Negative for congestion, rhinorrhea and sore throat.    Respiratory: Negative for cough and shortness of breath.    Cardiovascular: Negative for chest pain, palpitations and leg swelling.   Gastrointestinal: Positive for abdominal pain, constipation, nausea and vomiting. Negative for abdominal distention and blood in stool.   Genitourinary: Negative for dysuria and hematuria.   Musculoskeletal: Negative for arthralgias and myalgias.   Skin: Negative for rash and wound.   Neurological: Positive for weakness. Negative for dizziness and numbness.   Hematological: Negative for adenopathy. Does not bruise/bleed easily.   Psychiatric/Behavioral: Negative for agitation and confusion.     Objective:     Vital Signs (Most Recent):  Temp: 97.4 °F (36.3 °C) (04/06/17 1105)  Pulse: 108 (04/06/17 1105)  Resp: 16 (04/06/17 1105)  BP: 127/80 (04/06/17 1105)  SpO2: (!) 90 % (04/06/17 1105) Vital Signs (24h Range):  Temp:  [97.4 °F (36.3 °C)-99.2 °F (37.3 °C)] 97.4 °F (36.3 °C)  Pulse:  [] 108  Resp:  [8-28] 16  SpO2:  [87 %-96 %] 90 %  BP: (114-152)/(70-97) 127/80     Weight: 40.8 kg (90 lb)  Body mass index is 15.45 kg/(m^2).  Body surface area is 1.36 meters squared.      Intake/Output Summary (Last 24 hours) at 04/06/17 1336  Last data filed at 04/06/17 0615   Gross per 24 hour   Intake             1961 ml   Output             1900 ml   Net               61 ml       Physical Exam   Constitutional: She is oriented to person, place, and  time. She appears well-developed.   Fatigued, malnourished   HENT:   Head: Normocephalic.   Eyes: Pupils are equal, round, and reactive to light.   Neck: Neck supple. No JVD present.   Cardiovascular: Normal rate, regular rhythm and normal heart sounds.    No murmur heard.  Pulmonary/Chest: Effort normal. No respiratory distress. She has decreased breath sounds. She has no wheezes. She has no rhonchi. She has rales.   Abdominal:   middle vertical abdominal incision with staples. Venting gastrostomy tube noted in left abdominal quadrant. Hypoactive bowel sounds.   Musculoskeletal: She exhibits no edema.   Neurological: She is oriented to person, place, and time.   Skin: Skin is warm.   Psychiatric: She has a normal mood and affect.     Significant Labs:   Labs have been reviewed.    Diagnostic Results:  I have reviewed all pertinent imaging results/findings within the past 24 hours.    Assessment/Plan:     * Small bowel obstruction  - adhesions and ascites seen during Ex Lap with thickening of peritoneum and omental caking.  - biopsy sent, likely metastatic renal cell carcinoma  - venting gastrostomy tube has been placed  - continue pain control  - TPN per surgery    Metastatic renal cell carcinoma  - will need to follow up with Dr. Connelly in clinic after discharge for further management of cancer   - Ex lap revealed omental caking, ascites concerning for carcinomatosis   - venting gastrostomy tube has been placed.  - have discussed potential for hospice if further chemo not done  - appreciate palliative care's assistance with this patient.    Bone metastases  Continue pain control    Retroperitoneal lymphadenopathy  See above    Acute respiratory failure with hypoxia  - secondary to pulmonary metastases, volume overload/pulmonary edema, and inability to take deep inspirations due to abdominal pain  - continue supplemental oxygen   - I will re-dose furosemide today  - incentive spirometry and PT/OT for  atelectasis    Lung metastases  - see above    Protein-calorie malnutrition, severe  - secondary to advanced cancer, bowel obstruction, and peritoneal carcinomatosis with omental caking  - venting gastrostomy tube has been placed  - continue total parenteral nutrition per surgery.    DVT prophylaxis: enoxaparin    Disposition: pain control, wean oxygen, PT/OT, palliative care     Aki Munoz MD  Hematology/Oncology  [facility]        ATTENDING NOTE, ONCOLOGY INPATIENT TEAM    As above; events of last 24 hours noted.  Patient seen and examined, chart reviewed.  Appears uncomfortable, in mild distress from pain..  Lungs have few scattered ronchi.  No wheezes.   Abdomen is soft, with mild diffuse tendenress.  There is minimal erythema around her incision.  Bowel sounds are diminsihed.  Labs reviewed.    PLAN  Follow CBC and electrolytes daily.  Continue fentanyl and the PCA pump for now.  DVT prophylaxis.  PT/OT   to help patient with disability papers.  We answered all her questions regarding her prognosis.  She understands that this is an incurable situation and that her life expectancy is measured in months rather than years.  We will follow.    Cecilio Monsalve MD

## 2017-04-07 NOTE — PROGRESS NOTES
Progress Note  General Surgery    Admit Date: 3/31/2017  Post-operative Day: 2 Days Post-Op  Hospital Day: 8    SUBJECTIVE:     Follow-up For:  Procedure(s) (LRB):  LAPAROTOMY-EXPLORATORY (N/A)  GASTROSTOMY (N/A)    Pain controlled. g-tube in place draining. No BM/flatus. On TPN.     Scheduled Meds:   acetaminophen  650 mg Oral Q8H    amlodipine  10 mg Oral Daily    enoxaparin  40 mg Subcutaneous Q24H    fat emulsion 20%  250 mL Intravenous Daily    fentaNYL  1 patch Transdermal Q72H    pantoprazole  40 mg Intravenous Daily    sodium chloride 0.9%  10 mL Intravenous Q6H    sodium chloride 0.9%  3 mL Intravenous Q8H     Continuous Infusions:   sodium chloride 0.9% 100 mL/hr at 04/05/17 1344    Amino acid 5% - dextrose 20% (CLINIMIX-E) solution with additives (1L provides 50 gm AA, 200 gm CHO (680 kcal/L dextrose), Na 35, K 30, Mg 5, Ca 4.5, Acetate 80, Cl 39, Phos 15) 40 mL/hr at 04/06/17 2330    hydromorphone in 0.9 % NaCl 6 mg/30 ml       PRN Meds:naloxone, ondansetron, promethazine (PHENERGAN) IVPB, Flushing PICC Protocol **AND** sodium chloride 0.9% **AND** sodium chloride 0.9%    Review of patient's allergies indicates:   Allergen Reactions    No known drug allergies        Review of Systems  See prior notes for additional details not listed in HPI      OBJECTIVE:     Vital Signs (Most Recent)  Temp: 97.9 °F (36.6 °C) (04/07/17 0340)  Pulse: 109 (04/07/17 0700)  Resp: 18 (04/07/17 0340)  BP: 121/76 (04/07/17 0340)  SpO2: (!) 93 % (04/07/17 0700)    Vital Signs Range (Last 24H):  Temp:  [96.9 °F (36.1 °C)-98.6 °F (37 °C)]   Pulse:  []   Resp:  [16-20]   BP: (121-128)/(72-81)   SpO2:  [90 %-97 %]     I & O (Last 24H):    Intake/Output Summary (Last 24 hours) at 04/07/17 0740  Last data filed at 04/06/17 1909   Gross per 24 hour   Intake                0 ml   Output              200 ml   Net             -200 ml       Physical Exam:  General: NAD,   Neuro: aaox4, no obvious focal deficit    Respiratory: resps even unlabored  Cardiac: cap refill <2 sec, RRR  Abdomen: scaphoid abdomen, milo tender, midline incision. Incision c/d/i. Decreased BS.  G tube to gravity.   Extremities: Warm dry and intact      Lab Results   Component Value Date    WBC 10.23 04/06/2017    HGB 13.0 04/06/2017    HCT 38.7 04/06/2017    MCV 79 (L) 04/06/2017     (H) 04/06/2017     Lab Results   Component Value Date    CREATININE 0.6 04/07/2017    BUN 11 04/07/2017     (L) 04/07/2017    K 3.8 04/07/2017     04/07/2017    CO2 29 04/07/2017    CALCIUM 7.8 (L) 04/07/2017    PHOS 1.5 (L) 04/07/2017    MG 2.1 04/07/2017         Diagnostic Results:  Labs: Reviewed    ASSESSMENT/PLAN:     Nikia Lamas is a 51 y.o. female with metastatic renal cell carcinoma s/p left radical nephrectomy currently on cabozantinib, presenting with pSBO POD2 ex lap, omental caking, ascites concerning for carcinomatosis and G tube placement    - G tube to gravity   - Ambulate  - NPO  - TPN  - dilaudid PCA. Tylenol scheduled   - protonix daily  - Home amlodipine  - OOBTC/Ambulate  - Strict I & O's  - DVT prophylaxis  - cont care per heme/onc    Tyrone Parra PGY5

## 2017-04-07 NOTE — ASSESSMENT & PLAN NOTE
- secondary to pulmonary metastases, volume overload/pulmonary edema, and inability to take deep inspirations due to abdominal pain  - continue supplemental oxygen   - I will re-dose furosemide today  - incentive spirometry and PT/OT for atelectasis

## 2017-04-07 NOTE — PROGRESS NOTES
" Admit Assessment    Patient Identification  Nikia Lamas   :  1965  Admit Date:  3/31/2017  Attending Provider:  Gurpreet Haynes DO, F*              Referral: Patient was transferred to the Oncology Service today.   Pt has a diagnosis of Metastatic Renal Cell Carcinoma and was admitted this hospital stay due to Small bowel obstruction [K56.69]  Encounter for nasogastric (NG) tube placement [Z46.59]  Nausea and vomiting [R11.2]  Small bowel obstruction [K56.69]  Nausea and vomiting [R11.2]  Small bowel obstruction [K56.69]. Oncology social worker is involved for psychosocial services.  Patient presents as a 51 y.o. year old  female.    Persons interviewed: Patient and her daughter    Living Situation:    Will stay with her son at [address] [place] [address]  phone: [phone] (home); daughter: Alvaro Conde ()  (patient was living alone in [place] before her hospitalization)         Current or Past Agencies and Description of Services/Supplies    DME  None      Home Health  None    IV Infusion  None    Nutrition: Oral    Outpatient Pharmacy:     [facility]/pharmacy #[id number] [place] [address] [address] [place] [address]  Phone: [phone] Fax: [phone]      Patient Preference of agencies include: As stated above    Patient/Caregiver informed of right to choose providers or agencies.  Patient provides permission to release any necessary information to [facility] and to Non-[facility] agencies as needed to facilitate patient care, treatment planning, and patient discharge planning.  Written and verbal resources provided.                Adjustment to Diagnosis and Treatment  The patient stated "this is like a job at the moment." She was preoccupied with practical arrangements for disability at work, Social Security benefits and health care coverage. She has good support from her son and daughter living close to her as well as her " "stepfather. The patient was working full time at time of her hospitalization and was functioning independently. Her goal was "to be able to sit and fish a few more times"            History/Current Symptoms of Anxiety/Depression: No  History/Current Substance Use:   Social History     Social History Main Topics    Smoking status: Never Smoker    Smokeless tobacco: Never Used    Alcohol use 0.6 oz/week     1 Glasses of wine per week      Comment: social    Drug use: No    Sexual activity: Not Currently     Partners: Male     Birth control/ protection: Surgical, None      Comment: No partner x 1 year       Indications of Abuse/Neglect: No      Financial:  Payor/Plan Subscr  Sex Relation Sub. Ins. 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 [address]                            Other identified concerns/needs: Advance Directives  - discussed and provided to the patient. Also provided her with information on disability, SS disability and health care benefits (COBRA)    Plan:    Interventions/Referrals: Offered services to the patient and her daughter and provided them with contact information. Will follow as needed for supportive counseling and discharge arrangements.   Patient/caregiver engaged in treatment planning process.     providing psychosocial and supportive counseling, resources, education, assistance and discharge planning as appropriate.  Patient/caregiver state understanding of  available resources,  following, remains available.                           "

## 2017-04-07 NOTE — PT/OT/SLP EVAL
Physical Therapy  Evaluation and Treatment    Nikia Lamas   MRN: [id number]   Admitting Diagnosis: Small bowel obstruction s/p ex-lap, gastrotomy    PT Received On: 17  PT Start Time: 1045     PT Stop Time: 1118    PT Total Time (min): 33 min       Billable Minutes:  Evaluation 23 Co-eval for portion of time  and Therapeutic Activity 10    Diagnosis: Small bowel obstruction  Comorbidities that affect PT POC:  · Metastatic renal CA  · Liver and bone mets  · Pulmonary nodules     Past Medical History:   Diagnosis Date    Abnormal Pap smear of cervix 10/2015    + HR HPV    Anemia     hx chronic anemia, improved after ablation    Herpes simplex without mention of complication       Past Surgical History:   Procedure Laterality Date     SECTION  ,     DILATION AND CURETTAGE OF UTERUS      ENDOMETRIAL ABLATION  2014    Novasure endometrial ablation with D&C    laparoscopy for endometriosis      TUBAL LIGATION      Laparoscopic       Referring physician: Mena  Date referred to PT: 2017    General Precautions: Standard, fall      Patient History:  Living Environment Comment: Patient was living alone.  She will go to her son's house, 1 story with threshold step and tub/shower combo.  Pt was independent prior to admit, driving, and working.  She will have assistance upon d/c if needed.   Equipment Currently Used at Home: none    Subjective:  Communicated with OT prior to session.  Patient involved in therapy session today.    Chief Complaint: concerns about life and prognosis  Patient goals: return to home without assistance    Objective:    Patient found toileting on BSC with OT present.  She agreed to therapy evaluation.  After patient completed toileting and dressing with OT, she demonstrated dynamic standing balance by rearranging sheets and pillows on bed.  Patient also ambulated in the [place] with PT.      Cognitive Exam:  Oriented to: Person, Place, Time and  Situation    Follows Commands/attention: Follows multistep  commands  Communication: clear/fluent  Safety awareness/insight to disability: intact    Physical Exam:  Postural examination/scapula alignment: No postural abnormalities identified    Skin integrity: midline incision with staples intact.  Large bandage over sacrum.   Edema: pitting edema bilateral LEs with TEDs present     Sensation:   Intact LE bilateral LEs    Lower Extremity Range of Motion:  Right Lower Extremity: WFL  Left Lower Extremity: WFL    Lower Extremity Strength:  Right Lower Extremity: WFL  Left Lower Extremity: WFL     Gross motor coordination: WFL    Functional Mobility:  Bed Mobility:   NT    Transfers:  Sit <> Stand Assistance: Stand By Assistance  Bed <> Chair Assistance:  (SBA with assistance for line management only)  Toilet Transfer Assistance:  (SBA with assistance for line management only)    Gait:   Gait Distance: Pt ambulated 200 feet SBA with RW and therapist managing multiple lines and oxygen tank.onstrates forward flexed posture and decreased carson 2* post-op pain.  No other significant gait deviations.      Balance:   Static Sit: GOOD: Takes MODERATE challenges from all directions  Dynamic Sit: GOOD: Maintains balance through MODERATE excursions of active trunk movement  Static Stand: FAIR: Maintains without assist but unable to take challenges  Dynamic stand: GOOD-: Needs SUPERVISION only during gait and able to self right with moderate     Therapeutic Activities and Exercises:  Therapist educated the patient on the following:  · Role of PT, POC  · Importance of OOB mobilization   · Ambulation 3x/day with assist.    · D/c planning  · DME recs    Patient performed dynamic standing balance activities.  She was able to stand unassisted and reposition bed sheets and pillows without LOB.      Therapist had to educate patient several times on recommended OOB and ambulation schedule.  Patient very cautious with mobility.       AM-PAC 6 CLICK MOBILITY  How much help from another person does this patient currently need?   1 = Unable, Total/Dependent Assistance  2 = A lot, Maximum/Moderate Assistance  3 = A little, Minimum/Contact Guard/Supervision  4 = None, Modified [place]/Independent    Turning over in bed (including adjusting bedclothes, sheets and blankets)?: 3  Sitting down on and standing up from a chair with arms (e.g., wheelchair, bedside commode, etc.): 3  Moving from lying on back to sitting on the side of the bed?: 3  Moving to and from a bed to a chair (including a wheelchair)?: 3  Need to walk in hospital room?: 3  Climbing 3-5 steps with a railing?: 3  Total Score: 18     AM-PAC Raw Score CMS G-Code Modifier Level of Impairment Assistance   6 % Total / Unable   7 - 9 CM 80 - 100% Maximal Assist   10 - 14 CL 60 - 80% Moderate Assist   15 - 19 CK 40 - 60% Moderate Assist   20 - 22 CJ 20 - 40% Minimal Assist   23 CI 1-20% SBA / CGA   24 CH 0% Independent/ Mod I     Patient left up in chair with all lines intact, call bell in reach, and RN notified of patient's need for RW in room.     Assessment:   Nikia Lamas is a 51 y.o. female with a medical diagnosis of Small bowel obstruction and presents with decreased functional mobility and post-op pain.  She participated well, but remains cautious during mobilization.  Pt was able to ambulate 200 feet, but requested RW to increase comfort.  She did not require assistance with mobility prior to admit.  Pt expected to progress well with additional therapy and return to [facility]. She would benefit from skilled PT services to mobilize and progress to ambulation without an assistive device.    Rehab identified problem list/impairments: Rehab identified problem list/impairments: impaired endurance, pain, impaired functional mobilty, edema    Rehab potential is good.    Activity tolerance: Good    Discharge recommendations: Discharge Facility/Level Of Care Needs: home with  home health     Barriers to discharge: Barriers to Discharge: None    Equipment recommendations: Equipment Needed After Discharge:  (BSC)     GOALS:   Physical Therapy Goals        Problem: Physical Therapy Goal    Goal Priority Disciplines Outcome Goal Variances Interventions   Physical Therapy Goal     PT/OT, PT Ongoing (interventions implemented as appropriate)     Description:  Goals to be met by: 2017     Patient will increase functional independence with mobility by performin. Supine to sit with Stand-by Assistance  2. Sit to supine with Stand-by Assistance  3. Sit to stand transfer with Modified [place]  4. Bed to chair transfer with Modified [place] using no AD  5. Gait  x 300 feet with Stand-by Assistance using no assistive device.                 PLAN:    Patient to be seen 4 x/week to address the above listed problems via gait training, therapeutic activities, therapeutic exercises  Plan of Care expires: 17  Plan of Care reviewed with: patient, father, mother          Zoe Friedman, PT  2017

## 2017-04-07 NOTE — PLAN OF CARE
Problem: Physical Therapy Goal  Goal: Physical Therapy Goal  Goals to be met by: 2017     Patient will increase functional independence with mobility by performin. Supine to sit with Stand-by Assistance  2. Sit to supine with Stand-by Assistance  3. Sit to stand transfer with Modified [place]  4. Bed to chair transfer with Modified [place] using no AD  5. Gait x 300 feet with Stand-by Assistance using no assistive device.   Outcome: Ongoing (interventions implemented as appropriate)  Initial eval completed. Results, POC and goals discussed with patient.

## 2017-04-07 NOTE — ASSESSMENT & PLAN NOTE
- secondary to advanced cancer, bowel obstruction, and peritoneal carcinomatosis with omental caking  - venting gastrostomy tube has been placed  - continue total parenteral nutrition per surgery.

## 2017-04-07 NOTE — ASSESSMENT & PLAN NOTE
- will need to follow up with Dr. Connelly in clinic after discharge for further management of cancer   - Ex lap revealed omental caking, ascites concerning for carcinomatosis   - venting gastrostomy tube has been placed.  - have discussed potential for hospice if further chemo not done  - appreciate palliative care's assistance with this patient.

## 2017-04-07 NOTE — ASSESSMENT & PLAN NOTE
- adhesions and ascites seen during Ex Lap with thickening of peritoneum and omental caking.  - biopsy sent, likely metastatic renal cell carcinoma  - venting gastrostomy tube has been placed  - continue pain control  - TPN per surgery

## 2017-04-07 NOTE — PT/OT/SLP EVAL
"Occupational Therapy  Evaluation & Discharge    Nikia Lamas   MRN: [id number]   Admitting Diagnosis: Small bowel obstruction    OT Date of Treatment: 17   OT Start Time: 1026  OT Stop Time: 1118  OT Total Time (min): 52 min    Billable Minutes:  Evaluation 36 min  Self Care/Home Management 8 min  Therapeutic Activity 8 min    Diagnosis: Small bowel obstruction       Past Medical History:   Diagnosis Date    Abnormal Pap smear of cervix 10/2015    + HR HPV    Anemia     hx chronic anemia, improved after ablation    Herpes simplex without mention of complication       Past Surgical History:   Procedure Laterality Date     SECTION  ,     DILATION AND CURETTAGE OF UTERUS      ENDOMETRIAL ABLATION  2014    Novasure endometrial ablation with D&C    laparoscopy for endometriosis      TUBAL LIGATION      Laparoscopic       Referring physician: Dr. Haynes  Date referred to OT: 17    General Precautions: Standard, fall  Orthopedic Precautions: N/A  Braces: N/A          Patient History:  Living Environment  Lives With: alone  Transportation Available: family or friend will provide  Living Environment Comment:  (Pt lives alone but will be staying with her son in his H with 1 step to enter once discharged from hospital - son works during the day, but pt has family to assist during day if needed - PLOF was I without ADl)  Equipment Currently Used at Home: none    Prior level of function:   Bed Mobility/Transfers: independent  Grooming: independent  Bathing: independent  Upper Body Dressing: independent  Lower Body Dressing: independent  Toileting: independent  Home Management Skills: independent  Homemaking Responsibilities: Yes  Driving License: Yes  Mode of Transportation: Car  Occupation: Full time employment  Type of Occupation:  (aparment manager)     Dominant hand: right    Subjective:  Communicated with nurse prior to session.  "Let me try to do it myself.  That's what " "I'm supposed to do, right?"  Chief Complaint: pain  Patient/Family stated goals: pain management - return home    Pain Ratin/10     Objective:  Patient found with: telemetry, peripheral IV, oxygen    Cognitive Exam:  Oriented to: Person, Place, Time and Situation  Follows Commands/attention: Easily distracted and Follows two-step commands  Communication: clear/fluent  Memory:  No Deficits noted  Safety awareness/insight to disability: intact  Coping skills/emotional control: pt with numerous questions throughout session - pt with specific instructions on how to perform set up for ADL tasks    Visual/perceptual:  Intact    Physical Exam:  Postural examination/scapula alignment: No postural abnormalities identified  Skin integrity: dressing over sacrum  Edema: None noted in UEs     Sensation:   Intact    Upper Extremity Range of Motion:  Right Upper Extremity: WFL  Left Upper Extremity: WFL    Upper Extremity Strength:  Right Upper Extremity: WFL  Left Upper Extremity: WFL   Strength: good    Fine motor coordination:   Intact    Gross motor coordination: WFL    Functional Mobility:  Bed Mobility:  Rolling/Turning to Left: Modified independent  Scooting/Bridging: Modified Independent  Supine to Sit: Modified Independent    Transfers:  Sit <> Stand Assistance: Stand By Assistance  Sit <> Stand Assistive Device: No Assistive Device  Bed <> Chair Technique: Stand Pivot  Bed <> Chair Transfer Assistance: Stand By Assistance  Bed <> Chair Assistive Device: No Assistive Device  Toilet Transfer Technique: Stand Pivot  Toilet Transfer Assistance: Stand By Assistance  Toilet Transfer Assistive Device: bedside commode    Functional Ambulation: Pt walked with PT in hallways using RW - refer to PT assessment for further information    Activities of Daily Living:  Feeding Level of Assistance: Modified independent    UE Dressing Level of Assistance: Modified independent - t-shirt    LE Dressing Level of Assistance: Modified " "independent -underwear, pants, shoes    Grooming Position: Seated, EOB  Grooming Level of Assistance: Modified independent     Toileting Where Assessed: Bedside commode  Toileting Level of Assistance: Supervision     Bathing Level of Assistance: Activity did not occur      Balance:   Static Sit: GOOD: Takes MODERATE challenges from all directions  Dynamic Sit: GOOD: Maintains balance through MODERATE excursions of active trunk movement  Static Stand: FAIR+: Takes MINIMAL challenges from all directions  Dynamic stand: GOOD-: Needs SUPERVISION only during gait and able to self right with moderate     Therapeutic Activities and Exercises:  · ADLs and func mobility completed above  · Discussed safe techniques for ADLs prior to performing tasks - discussed DME for ADL with recommendation for BSC  · Pt and family educated on role of OT in acute setting  · Educated on positioning of extremities for edema mangement    AM-[facility] 6 CLICK ADL  How much help from another person does this patient currently need?  1 = Unable, Total/Dependent Assistance  2 = A lot, Maximum/Moderate Assistance  3 = A little, Minimum/Contact Guard/Supervision  4 = None, Modified [place]/Independent    Putting on and taking off regular lower body clothing? : 4  Bathing (including washing, rinsing, drying)?: 3  Toileting, which includes using toilet, bedpan, or urinal? : 4  Putting on and taking off regular upper body clothing?: 4  Taking care of personal grooming such as brushing teeth?: 4  Eating meals?: 4  Total Score: 23    AM-PAC Raw Score CMS "G-Code Modifier Level of Impairment Assistance   6 % Total / Unable   7 - 9 CM 80 - 100% Maximal Assist   10 - 14 CL 60 - 80% Moderate Assist   15 - 19 CK 40 - 60% Moderate Assist   20 - 22 CJ 20 - 40% Minimal Assist   23 CI 1-20% SBA / CGA   24 CH 0% Independent/ Mod I       Patient left in bedside chair with all lines intact, call button in reach and family present    Assessment:  Nikia HEART" Cydney is a 51 y.o. female with a medical diagnosis of Small bowel obstruction and presents with decreased endurance, pain, and gait instability.  Pt's PLOF was I with mobility and self care skills. Pt performed ADLs and func mobility with mod I for increased time to complete tasks as a result of pain.  As pt is near baseline and will have support in place upon discharge, OT services are not needed at this time.  OT recommends a BSC for DME for safe transfers and to facilitate toileting at discharge due to pt's increased pain.   .    Rehab identified problem list/impairments: Rehab identified problem list/impairments: weakness, impaired endurance, gait instability, pain, edema, impaired skin    Rehab potential is good.    Activity tolerance: Good    Discharge recommendations: Discharge Facility/Level Of Care Needs: home (with family support)     Barriers to discharge: Barriers to Discharge: None    Equipment recommendations: bedside commode     GOALS:   Occupational Therapy Goals     Not on file      Multidisciplinary Problems (Resolved)        Problem: Occupational Therapy Goal    Goal Priority Disciplines Outcome Interventions   Occupational Therapy Goal   (Resolved)     OT, PT/OT Outcome(s) achieved              PLAN:  Patient to be seen   to address the above listed problems via    Plan of Care expires:    Plan of Care reviewed with: patient         Amy HEART Milo, OT  04/07/2017

## 2017-04-08 LAB
ANION GAP SERPL CALC-SCNC: 8 MMOL/L
BASOPHILS # BLD AUTO: 0.02 K/UL
BASOPHILS NFR BLD: 0.3 %
BUN SERPL-MCNC: 10 MG/DL
CALCIUM SERPL-MCNC: 7.9 MG/DL
CHLORIDE SERPL-SCNC: 100 MMOL/L
CO2 SERPL-SCNC: 30 MMOL/L
CREAT SERPL-MCNC: 0.5 MG/DL
DIFFERENTIAL METHOD: ABNORMAL
EOSINOPHIL # BLD AUTO: 0.5 K/UL
EOSINOPHIL NFR BLD: 7.3 %
ERYTHROCYTE [DISTWIDTH] IN BLOOD BY AUTOMATED COUNT: 21.9 %
EST. GFR  (AFRICAN AMERICAN): >60 ML/MIN/1.73 M^2
EST. GFR  (NON AFRICAN AMERICAN): >60 ML/MIN/1.73 M^2
GLUCOSE SERPL-MCNC: 120 MG/DL
HCT VFR BLD AUTO: 36.7 %
HGB BLD-MCNC: 12.2 G/DL
LYMPHOCYTES # BLD AUTO: 0.5 K/UL
LYMPHOCYTES NFR BLD: 7.4 %
MAGNESIUM SERPL-MCNC: 1.8 MG/DL
MCH RBC QN AUTO: 26.8 PG
MCHC RBC AUTO-ENTMCNC: 33.2 %
MCV RBC AUTO: 81 FL
MONOCYTES # BLD AUTO: 0.8 K/UL
MONOCYTES NFR BLD: 10.7 %
NEUTROPHILS # BLD AUTO: 5.3 K/UL
NEUTROPHILS NFR BLD: 73.9 %
PHOSPHATE SERPL-MCNC: 1 MG/DL
PLATELET # BLD AUTO: 265 K/UL
PMV BLD AUTO: 9.6 FL
POTASSIUM SERPL-SCNC: 3.4 MMOL/L
RBC # BLD AUTO: 4.56 M/UL
SODIUM SERPL-SCNC: 138 MMOL/L
WBC # BLD AUTO: 7.12 K/UL

## 2017-04-08 PROCEDURE — 63600175 PHARM REV CODE 636 W HCPCS: Performed by: INTERNAL MEDICINE

## 2017-04-08 PROCEDURE — 84100 ASSAY OF PHOSPHORUS: CPT

## 2017-04-08 PROCEDURE — 63600175 PHARM REV CODE 636 W HCPCS: Performed by: PHYSICIAN ASSISTANT

## 2017-04-08 PROCEDURE — 63600175 PHARM REV CODE 636 W HCPCS: Performed by: STUDENT IN AN ORGANIZED HEALTH CARE EDUCATION/TRAINING PROGRAM

## 2017-04-08 PROCEDURE — 25000003 PHARM REV CODE 250: Performed by: HOSPITALIST

## 2017-04-08 PROCEDURE — 25000003 PHARM REV CODE 250: Performed by: STUDENT IN AN ORGANIZED HEALTH CARE EDUCATION/TRAINING PROGRAM

## 2017-04-08 PROCEDURE — 85025 COMPLETE CBC W/AUTO DIFF WBC: CPT

## 2017-04-08 PROCEDURE — 25000003 PHARM REV CODE 250: Performed by: INTERNAL MEDICINE

## 2017-04-08 PROCEDURE — 97116 GAIT TRAINING THERAPY: CPT

## 2017-04-08 PROCEDURE — 25000003 PHARM REV CODE 250

## 2017-04-08 PROCEDURE — 11000001 HC ACUTE MED/SURG PRIVATE ROOM

## 2017-04-08 PROCEDURE — 99233 SBSQ HOSP IP/OBS HIGH 50: CPT | Mod: ,,, | Performed by: INTERNAL MEDICINE

## 2017-04-08 PROCEDURE — 83735 ASSAY OF MAGNESIUM: CPT

## 2017-04-08 PROCEDURE — 63600175 PHARM REV CODE 636 W HCPCS: Performed by: SURGERY

## 2017-04-08 PROCEDURE — 80048 BASIC METABOLIC PNL TOTAL CA: CPT

## 2017-04-08 PROCEDURE — C9113 INJ PANTOPRAZOLE SODIUM, VIA: HCPCS | Performed by: PHYSICIAN ASSISTANT

## 2017-04-08 RX ORDER — POTASSIUM CHLORIDE 7.45 MG/ML
40 INJECTION INTRAVENOUS ONCE
Status: COMPLETED | OUTPATIENT
Start: 2017-04-08 | End: 2017-04-08

## 2017-04-08 RX ORDER — FUROSEMIDE 10 MG/ML
20 INJECTION INTRAMUSCULAR; INTRAVENOUS ONCE
Status: COMPLETED | OUTPATIENT
Start: 2017-04-08 | End: 2017-04-08

## 2017-04-08 RX ORDER — HYDROMORPHONE HYDROCHLORIDE 1 MG/ML
0.2 INJECTION, SOLUTION INTRAMUSCULAR; INTRAVENOUS; SUBCUTANEOUS ONCE AS NEEDED
Status: COMPLETED | OUTPATIENT
Start: 2017-04-08 | End: 2017-04-08

## 2017-04-08 RX ADMIN — ACETAMINOPHEN 650 MG: 325 TABLET ORAL at 06:04

## 2017-04-08 RX ADMIN — Medication 10 ML: at 06:04

## 2017-04-08 RX ADMIN — Medication 10 ML: at 11:04

## 2017-04-08 RX ADMIN — Medication 10 ML: at 12:04

## 2017-04-08 RX ADMIN — ENOXAPARIN SODIUM 40 MG: 100 INJECTION SUBCUTANEOUS at 06:04

## 2017-04-08 RX ADMIN — Medication: at 03:04

## 2017-04-08 RX ADMIN — ASCORBIC ACID, VITAMIN A PALMITATE, CHOLECALCIFEROL, THIAMINE HYDROCHLORIDE, RIBOFLAVIN-5 PHOSPHATE SODIUM, PYRIDOXINE HYDROCHLORIDE, NIACINAMIDE, DEXPANTHENOL, ALPHA-TOCOPHEROL ACETATE, VITAMIN K1, FOLIC ACID, BIOTIN, CYANOCOBALAMIN: 200; 3300; 200; 6; 3.6; 6; 40; 15; 10; 150; 600; 60; 5 INJECTION, SOLUTION INTRAVENOUS at 12:04

## 2017-04-08 RX ADMIN — HYDROMORPHONE HYDROCHLORIDE 0.2 MG: 1 INJECTION, SOLUTION INTRAMUSCULAR; INTRAVENOUS; SUBCUTANEOUS at 08:04

## 2017-04-08 RX ADMIN — FUROSEMIDE 20 MG: 10 INJECTION, SOLUTION INTRAVENOUS at 09:04

## 2017-04-08 RX ADMIN — POTASSIUM CHLORIDE 40 MEQ: 10 INJECTION, SOLUTION INTRAVENOUS at 09:04

## 2017-04-08 RX ADMIN — Medication 3 ML: at 10:04

## 2017-04-08 RX ADMIN — PANTOPRAZOLE SODIUM 40 MG: 40 INJECTION, POWDER, FOR SOLUTION INTRAVENOUS at 09:04

## 2017-04-08 RX ADMIN — ACYCLOVIR SODIUM 410 MG: 50 INJECTION, SOLUTION INTRAVENOUS at 03:04

## 2017-04-08 RX ADMIN — ONDANSETRON 4 MG: 2 INJECTION INTRAMUSCULAR; INTRAVENOUS at 08:04

## 2017-04-08 RX ADMIN — ACYCLOVIR SODIUM 410 MG: 50 INJECTION, SOLUTION INTRAVENOUS at 11:04

## 2017-04-08 RX ADMIN — ACYCLOVIR SODIUM 410 MG: 50 INJECTION, SOLUTION INTRAVENOUS at 06:04

## 2017-04-08 RX ADMIN — Medication 3 ML: at 06:04

## 2017-04-08 RX ADMIN — PROMETHAZINE HYDROCHLORIDE 6.25 MG: 25 INJECTION, SOLUTION INTRAMUSCULAR; INTRAVENOUS at 10:04

## 2017-04-08 RX ADMIN — SODIUM PHOSPHATE, MONOBASIC, MONOHYDRATE AND SODIUM PHOSPHATE, DIBASIC, ANHYDROUS 39.99 MMOL: 276; 142 INJECTION, SOLUTION INTRAVENOUS at 09:04

## 2017-04-08 RX ADMIN — ONDANSETRON 4 MG: 2 INJECTION INTRAMUSCULAR; INTRAVENOUS at 11:04

## 2017-04-08 RX ADMIN — AMLODIPINE BESYLATE 10 MG: 10 TABLET ORAL at 09:04

## 2017-04-08 NOTE — ASSESSMENT & PLAN NOTE
- secondary to pulmonary metastases, volume overload/pulmonary edema, and inability to take deep inspirations due to abdominal pain  - continue supplemental oxygen, wean off for SpO2 goal of >90%   - still with 9L requirement today  - incentive spirometry and PT/OT for atelectasis  - did not receive lasix yesterday, will give another 20mg IV today

## 2017-04-08 NOTE — ASSESSMENT & PLAN NOTE
- will need to follow up with Dr. Connelly in clinic after discharge for further management of cancer   - Ex lap revealed omental caking, ascites concerning for carcinomatosis   - venting gastrostomy tube has been placed.  - have discussed potential for hospice if further chemo not done   - patient stated she may not want further chemo at this stage but has not completely made up her mind yet   - short term goal to get out of hospital and get stronger  - appreciate palliative care's assistance with this patient.

## 2017-04-08 NOTE — PLAN OF CARE
Problem: Physical Therapy Goal  Goal: Physical Therapy Goal  Goals to be met by: 2017     Patient will increase functional independence with mobility by performin. Supine to sit with Stand-by Assistance  2. Sit to supine with Stand-by Assistance  3. Sit to stand transfer with Modified [place]  4. Bed to chair transfer with Modified [place] using no AD  5. Gait x 300 feet with Stand-by Assistance using no assistive device.    Outcome: Ongoing (interventions implemented as appropriate)  Patient progressing toward goals.  Goals remain appropriate.

## 2017-04-08 NOTE — PROGRESS NOTES
Progress Note  General Surgery    Admit Date: 3/31/2017  Post-operative Day: 3 Days Post-Op  Hospital Day: 9    SUBJECTIVE:     Follow-up For:  Procedure(s) (LRB):  LAPAROTOMY-EXPLORATORY (N/A)  GASTROSTOMY (N/A)    Pain controlled. g-tube in place draining. Still no BM/flatus. On TPN.     Scheduled Meds:   acetaminophen  650 mg Oral Q8H    acyclovir  10 mg/kg Intravenous Q8H    amlodipine  10 mg Oral Daily    enoxaparin  40 mg Subcutaneous Q24H    fentaNYL  1 patch Transdermal Q72H    furosemide  20 mg Intravenous Once    pantoprazole  40 mg Intravenous Daily    potassium chloride  40 mEq Intravenous Once    sodium chloride 0.9%  10 mL Intravenous Q6H    sodium chloride 0.9%  3 mL Intravenous Q8H    sodium phosphate IVPB  39.99 mmol Intravenous Once     Continuous Infusions:   sodium chloride 0.9% 75 mL/hr at 04/07/17 1023    Amino acid 5% - dextrose 20% (CLINIMIX-E) solution with additives (1L provides 50 gm AA, 200 gm CHO (680 kcal/L dextrose), Na 35, K 30, Mg 5, Ca 4.5, Acetate 80, Cl 39, Phos 15) 40 mL/hr at 04/08/17 0044    hydromorphone in 0.9 % NaCl 6 mg/30 ml       PRN Meds:naloxone, ondansetron, promethazine (PHENERGAN) IVPB, Flushing PICC Protocol **AND** sodium chloride 0.9% **AND** sodium chloride 0.9%    Review of patient's allergies indicates:   Allergen Reactions    No known drug allergies        Review of Systems  See prior notes for additional details not listed in HPI      OBJECTIVE:     Vital Signs (Most Recent)  Temp: 96.9 °F (36.1 °C) (04/08/17 0400)  Pulse: (!) 121 (04/08/17 0700)  Resp: 16 (04/08/17 0400)  BP: (!) 140/83 (04/08/17 0400)  SpO2: (!) 92 % (04/07/17 1158)    Vital Signs Range (Last 24H):  Temp:  [96.6 °F (35.9 °C)-98.7 °F (37.1 °C)]   Pulse:  [108-134]   Resp:  [12-16]   BP: (120-140)/(70-83)   SpO2:  [92 %]     I & O (Last 24H):    Intake/Output Summary (Last 24 hours) at 04/08/17 0906  Last data filed at 04/08/17 0600   Gross per 24 hour   Intake             2116  ml   Output             1100 ml   Net             1291 ml       Physical Exam:  General: NAD,   Neuro: aaox4, no obvious focal deficit   Respiratory: resps even unlabored  Cardiac: cap refill <2 sec, RRR  Abdomen: scaphoid abdomen, milo tender, midline incision. Incision c/d/i. Decreased BS.  G tube to gravity.   Extremities: Warm dry and intact      Lab Results   Component Value Date    WBC 7.12 04/08/2017    HGB 12.2 04/08/2017    HCT 36.7 (L) 04/08/2017    MCV 81 (L) 04/08/2017     04/08/2017     Lab Results   Component Value Date    CREATININE 0.5 04/08/2017    BUN 10 04/08/2017     04/08/2017    K 3.4 (L) 04/08/2017     04/08/2017    CO2 30 (H) 04/08/2017    CALCIUM 7.9 (L) 04/08/2017    PHOS 1.0 (LL) 04/08/2017    MG 1.8 04/08/2017         Diagnostic Results:  Labs: Reviewed    ASSESSMENT/PLAN:     Nikia Lamas is a 51 y.o. female with metastatic renal cell carcinoma s/p left radical nephrectomy currently on cabozantinib, presenting with pSBO POD3 ex lap, omental caking, ascites concerning for carcinomatosis and G tube placement    - G tube to gravity   - Ambulate  - NPO  - TPN  - dilaudid PCA. Tylenol scheduled   - protonix daily  - Home amlodipine  - OOBTC/Ambulate  - Strict I & O's  - DVT prophylaxis  - cont care per heme/onc    Geovanna Lopez PGY1

## 2017-04-08 NOTE — PT/OT/SLP PROGRESS
"Physical Therapy  Treatment    Nikia Lamas   MRN: [id number]   Admitting Diagnosis: Small bowel obstruction s/p ex-lap    PT Received On: 17  PT Start Time: 1359     PT Stop Time: 1415    PT Total Time (min): 16 min       Billable Minutes:  Gait [id number]    Treatment Type: Treatment  PT/PTA: PT             General Precautions: Standard, fall    Subjective:  Communicated with RN prior to session.  "I have walked once this morning.  I am ready to do it again"    Pain Ratin/10   Pain Addressed: Pre-medicate for activity  Pain Rating Post-Intervention: 9/10    Objective:   Patient found with:  (IV, oxygen, PCA, G tube)  Patient found sitting up in chair with parents at bedside.  She agreed to therapy despite pain.  PCA used prior to gait trial.  Pt demonstrated gait in the fall x300 feet, but still relies heavily on RW for support.  Patient limited from full erect posture by significant abdominal and back pain.  Patient also has diagnosis of cancer, pulmonary nodules, and bone mets.  Patient may require AD to assist with gait upon d/c.  Therapist discussed AD for d/c to assist with ambulation.      Functional Mobility:  Transfers:  Sit <> Stand Assistance: Supervision  Bed <> Chair Assistance: Supervision    Gait:   Gait Distance: 300 feet with RW and supervision/verbal cues for erect posture.  Patient demonstrates very slow gait and forward flexed posture 2* pain, but has equal, reciprocal gait pattern.     AM-PAC 6 CLICK MOBILITY  How much help from another person does this patient currently need?   1 = Unable, Total/Dependent Assistance  2 = A lot, Maximum/Moderate Assistance  3 = A little, Minimum/Contact Guard/Supervision  4 = None, Modified [place]/Independent    Turning over in bed (including adjusting bedclothes, sheets and blankets)?: 3  Sitting down on and standing up from a chair with arms (e.g., wheelchair, bedside commode, etc.): 3  Moving from lying on back to sitting on the side of the " bed?: 3  Moving to and from a bed to a chair (including a wheelchair)?: 3  Need to walk in hospital room?: 3  Climbing 3-5 steps with a railing?: 3  Total Score: 18    AM-PAC Raw Score CMS G-Code Modifier Level of Impairment Assistance   6 % Total / Unable   7 - 9 CM 80 - 100% Maximal Assist   10 - 14 CL 60 - 80% Moderate Assist   15 - 19 CK 40 - 60% Moderate Assist   20 - 22 CJ 20 - 40% Minimal Assist   23 CI 1-20% SBA / CGA   24 CH 0% Independent/ Mod I     Patient left up in chair with all lines intact and call button in reach.    Assessment:  Nikia Lamas is a 51 y.o. female with a medical diagnosis of Small bowel obstruction and presents with decreased functional mobility.  She was able to increase ambulation distance to 300 feet, but continues to rely heavily on RW for support. Patient limited from full erect posture by significant abdominal and back pain.  Due to comorbidities (diagnosis of cancer, pulmonary nodules, and bone mets) patient may require AD to assist with gait upon d/c.  Will continue to progress as tolerated and update d/c needs.     Rehab identified problem list/impairments: Rehab identified problem list/impairments: impaired endurance, impaired functional mobilty, pain, impaired cardiopulmonary response to activity    Rehab potential is good.    Activity tolerance: Fair    Discharge recommendations: Discharge Facility/Level Of Care Needs: home with home health     Barriers to discharge: Barriers to Discharge: None    Equipment recommendations: Equipment Needed After Discharge:  (BSC, Rollator walker(with potential to progress to no AD))     GOALS:   Physical Therapy Goals        Problem: Physical Therapy Goal    Goal Priority Disciplines Outcome Goal Variances Interventions   Physical Therapy Goal     PT/OT, PT Ongoing (interventions implemented as appropriate)     Description:  Goals to be met by: 4/21/2017     Patient will increase functional independence with mobility by  performin. Supine to sit with Stand-by Assistance  2. Sit to supine with Stand-by Assistance  3. Sit to stand transfer with Modified [place]  4. Bed to chair transfer with Modified [place] using no AD  5. Gait  x 300 feet with Stand-by Assistance using no assistive device.                 PLAN:    Patient to be seen 4 x/week  to address the above listed problems via gait training, therapeutic activities, therapeutic exercises  Plan of Care expires: 17  Plan of Care reviewed with: patient, parent         Zoe Friedman, PT  2017

## 2017-04-08 NOTE — PROGRESS NOTES
"[facility]  Hematology/Oncology  Progress Note    Patient Name: Nikia Lamas  Admission Date: 3/31/2017  Hospital Length of Stay: 8 days  Code Status: Full Code     Subjective:     HPI:  52 yo F with PMHx renal cell carcinoma with mets to liver and bone s/p L nephrectomy 01/2017 presents with 8 d constipation with associated nausea/vomiting intermittently for past 7 days.  She notes difficulty keeping down any food originally but had been using some Ensure supplements.  Had been doing daily PEG 17 g but decided to try a magnesium citrate due to claims to help with abdominal cramping.  She had no relief with this.  Tried lactulose with increase in abd pain and cramping and a couple limited watery bowel movements after using this.  Notes approx 4 lb weight loss over past week or so.  She has been on cabozantinib 60 mg po qd since 01/25/17 with no issues with bowel obstruction since starting chemo.  Has abdominal procedures in [facility] including recent L nephrectomy, 2 C sections, and tubal ligation.       Oncologic History (per Dr. Connelly's note dated 3/24/17):  Nikia Lamas is a 51 y.o. White female, referred by Dr. German, for management of metastatic renal cell carcinoma. Pt reports left mass was found incidentally while being worked up for a persistent dry cough that began in October 2016. She was evaluated with a CXR and a non-contrast chest CT.   12/14/16- CT of the chest without contrast showed multiple pulmonary nodes and a large left renal mass present. The L kidney was not fully evaluated as only the superior aspect was in the CT, but the mass was ~ 11 cm in diameter.   12/19/16- CT CAP reveals "Large heterogeneous left renal mass, consistent with renal cell carcinoma.  This mass does not appear to invade the renal vein or IVC.  Innumerable pulmonary nodules, retroperitoneal lymphadenopathy and osseous lytic lesions, concerning for metastatic disease. Indeterminate hepatic lesions, " "possibly metastatic disease."  1/3/17- Radical left nephrectomy      Interval history:  LAYLA. Patient worked with PT yesterday. Still with PCA but interval lock out time increased. Will try to transition off pump today. Pall care helping. She is c/o abdominal tightness/bloating. Still without BM      Oncology Treatment Plan:   [No treatment plan]    Medications:  Continuous Infusions:   sodium chloride 0.9% 75 mL/hr at 17 1023    Amino acid 5% - dextrose 20% (CLINIMIX-E) solution with additives (1L provides 50 gm AA, 200 gm CHO (680 kcal/L dextrose), Na 35, K 30, Mg 5, Ca 4.5, Acetate 80, Cl 39, Phos 15) 40 mL/hr at 17 0044    hydromorphone in 0.9 % NaCl 6 mg/30 ml       Scheduled Meds:   acetaminophen  650 mg Oral Q8H    acyclovir  10 mg/kg Intravenous Q8H    amlodipine  10 mg Oral Daily    enoxaparin  40 mg Subcutaneous Q24H    fentaNYL  1 patch Transdermal Q72H    pantoprazole  40 mg Intravenous Daily    potassium chloride  40 mEq Intravenous Once    sodium chloride 0.9%  10 mL Intravenous Q6H    sodium chloride 0.9%  3 mL Intravenous Q8H    sodium phosphate IVPB  39.99 mmol Intravenous Once     PRN Meds:naloxone, ondansetron, promethazine (PHENERGAN) IVPB, Flushing PICC Protocol **AND** sodium chloride 0.9% **AND** sodium chloride 0.9%     Review of patient's allergies indicates:   Allergen Reactions    No known drug allergies         Past Medical History:   Diagnosis Date    Abnormal Pap smear of cervix 10/2015    + HR HPV    Anemia     hx chronic anemia, improved after ablation    Herpes simplex without mention of complication      Past Surgical History:   Procedure Laterality Date     SECTION  ,     DILATION AND CURETTAGE OF UTERUS      ENDOMETRIAL ABLATION  2014    Novasure endometrial ablation with D&C    laparoscopy for endometriosis      TUBAL LIGATION  2006    Laparoscopic     Family History     Problem Relation (Age of Onset)    Alzheimer's " disease Maternal Grandmother    Hypertension Mother        Social History Main Topics    Smoking status: Never Smoker    Smokeless tobacco: Never Used    Alcohol use 0.6 oz/week     1 Glasses of wine per week      Comment: social    Drug use: No    Sexual activity: Not Currently     Partners: Male     Birth control/ protection: Surgical, None      Comment: No partner x 1 year       Review of Systems   Constitutional: Positive for appetite change and unexpected weight change. Negative for chills and fever.   HENT: Negative for congestion, rhinorrhea and sore throat.    Respiratory: Negative for cough and shortness of breath.    Cardiovascular: Negative for chest pain, palpitations and leg swelling.   Gastrointestinal: Positive for abdominal pain, constipation, nausea and vomiting. Negative for abdominal distention and blood in stool.   Genitourinary: Negative for dysuria and hematuria.   Musculoskeletal: Negative for arthralgias and myalgias.   Skin: Negative for rash and wound.   Neurological: Positive for weakness. Negative for dizziness and numbness.   Hematological: Negative for adenopathy. Does not bruise/bleed easily.   Psychiatric/Behavioral: Negative for agitation and confusion.     Objective:     Vital Signs (Most Recent):  Temp: 96.9 °F (36.1 °C) (04/08/17 0400)  Pulse: (!) 121 (04/08/17 0700)  Resp: 16 (04/08/17 0400)  BP: (!) 140/83 (04/08/17 0400)  SpO2: (!) 92 % (04/07/17 1158) Vital Signs (24h Range):  Temp:  [96.6 °F (35.9 °C)-98.7 °F (37.1 °C)] 96.9 °F (36.1 °C)  Pulse:  [108-134] 121  Resp:  [12-16] 16  SpO2:  [92 %] 92 %  BP: (120-140)/(70-83) 140/83     Weight: 40.8 kg (90 lb)  Body mass index is 15.45 kg/(m^2).  Body surface area is 1.36 meters squared.      Intake/Output Summary (Last 24 hours) at 04/08/17 0843  Last data filed at 04/08/17 0600   Gross per 24 hour   Intake             2391 ml   Output             1100 ml   Net             1291 ml       Physical Exam   Constitutional: She is  oriented to person, place, and time. She appears well-developed.   Fatigued, malnourished   HENT:   Head: Normocephalic.   Eyes: Pupils are equal, round, and reactive to light.   Neck: Neck supple. No JVD present.   Cardiovascular: Normal rate, regular rhythm and normal heart sounds.    No murmur heard.  Pulmonary/Chest: Effort normal. No respiratory distress. She has decreased breath sounds. She has no wheezes. She has no rhonchi. She has rales.   Abdominal:   middle vertical abdominal incision with staples. Venting gastrostomy tube noted in left abdominal quadrant. Hypoactive bowel sounds.   Musculoskeletal: She exhibits no edema.   Neurological: She is oriented to person, place, and time.   Skin: Skin is warm.   Psychiatric: She has a normal mood and affect.     Significant Labs:   Labs have been reviewed.    Diagnostic Results:  I have reviewed all pertinent imaging results/findings within the past 24 hours.    Assessment/Plan:     * Small bowel obstruction  - adhesions and ascites seen during Ex Lap with thickening of peritoneum and omental caking.   - POD 3   - continue NPO except water and ice chips  - biopsy sent, likely metastatic renal cell carcinoma  - venting gastrostomy tube has been placed, to gravity  - continue pain control  - TPN per surgery    Metastatic renal cell carcinoma  - will need to follow up with Dr. Connelly in clinic after discharge for further management of cancer   - Ex lap revealed omental caking, ascites concerning for carcinomatosis   - venting gastrostomy tube has been placed.  - have discussed potential for hospice if further chemo not done   - patient stated she may not want further chemo at this stage but has not completely made up her mind yet   - short term goal to get out of hospital and get stronger  - appreciate palliative care's assistance with this patient.    Bone metastases  - Continue pain control, palliative care helping with pain management    Retroperitoneal  lymphadenopathy  See above    Lung metastases  - see above    Acute respiratory failure with hypoxia  - secondary to pulmonary metastases, volume overload/pulmonary edema, and inability to take deep inspirations due to abdominal pain  - continue supplemental oxygen, wean off for SpO2 goal of >90%   - still with 9L requirement today  - incentive spirometry and PT/OT for atelectasis  - did not receive lasix yesterday, will give another 20mg IV today    Protein-calorie malnutrition, severe  - secondary to advanced cancer, bowel obstruction, and peritoneal carcinomatosis with omental caking  - venting gastrostomy tube has been placed  - continue total parenteral nutrition per surgery.      Diet: NPO except water and ice chips  DVT ppx: lovenox  Dispo: pending pain control, PT/OT rec's home health, possible d/c middle of next week with outpatient follow up with Dr. Godwin Charles MD  Hematology/Oncology  [facility]      ATTENDING NOTE, ONCOLOGY INPATIENT TEAM    As above; events of last 24 hours noted.  Patient seen and examined, chart reviewed.  Appears slightly un comfortable, in mild distress from her abdominal pain.  Lungs are clear to auscultation.  Abdomen is slightly distended with mild diffuse tenderness.  Bowel sounds are absent.  Labs reviewed.    PLAN  Follow electrolytes daily since she is on TPN.  Repeat CBC in 2 days.  Continue TPN.  Continue fentanyl and the PCA for now.  Encouraged patient to increase ambulation.  Continue enoxaparin until she becomes more ambulatory.  Wean off O2.  The family's questions were answered to their satisfaction.    Cecilio Monsalve MD

## 2017-04-08 NOTE — PLAN OF CARE
Problem: Patient Care Overview  Goal: Plan of Care Review  Outcome: Ongoing (interventions implemented as appropriate)  Patient AAOx3. VSS. No falls/injuries as pt calls for assistance. Fall precautions in place. No signs and symptoms of infection noted. Patient ambulated in hallway with assistance. No signs of skin breakdown noted. Surgical site clean, dry, and intact. PICC clean, dry and intact. Pain being monitored and controlled with PCA pump. Bed in lowest position, wheels locked, call light in reach. Will continue to monitor.

## 2017-04-08 NOTE — ASSESSMENT & PLAN NOTE
- adhesions and ascites seen during Ex Lap with thickening of peritoneum and omental caking.   - POD 3   - continue NPO except water and ice chips  - biopsy sent, likely metastatic renal cell carcinoma  - venting gastrostomy tube has been placed, to gravity  - continue pain control  - TPN per surgery

## 2017-04-08 NOTE — SUBJECTIVE & OBJECTIVE
Interval history:  NAEON. Patient worked with PT yesterday. Still with PCA but interval lock out time increased. Will try to transition off pump today. Pall care helping. She is c/o abdominal tightness/bloating. Still without BM      Oncology Treatment Plan:   [No treatment plan]    Medications:  Continuous Infusions:   sodium chloride 0.9% 75 mL/hr at 17 1023    Amino acid 5% - dextrose 20% (CLINIMIX-E) solution with additives (1L provides 50 gm AA, 200 gm CHO (680 kcal/L dextrose), Na 35, K 30, Mg 5, Ca 4.5, Acetate 80, Cl 39, Phos 15) 40 mL/hr at 17 0044    hydromorphone in 0.9 % NaCl 6 mg/30 ml       Scheduled Meds:   acetaminophen  650 mg Oral Q8H    acyclovir  10 mg/kg Intravenous Q8H    amlodipine  10 mg Oral Daily    enoxaparin  40 mg Subcutaneous Q24H    fentaNYL  1 patch Transdermal Q72H    pantoprazole  40 mg Intravenous Daily    potassium chloride  40 mEq Intravenous Once    sodium chloride 0.9%  10 mL Intravenous Q6H    sodium chloride 0.9%  3 mL Intravenous Q8H    sodium phosphate IVPB  39.99 mmol Intravenous Once     PRN Meds:naloxone, ondansetron, promethazine (PHENERGAN) IVPB, Flushing PICC Protocol **AND** sodium chloride 0.9% **AND** sodium chloride 0.9%     Review of patient's allergies indicates:   Allergen Reactions    No known drug allergies         Past Medical History:   Diagnosis Date    Abnormal Pap smear of cervix 10/2015    + HR HPV    Anemia     hx chronic anemia, improved after ablation    Herpes simplex without mention of complication      Past Surgical History:   Procedure Laterality Date     SECTION  ,     DILATION AND CURETTAGE OF UTERUS      ENDOMETRIAL ABLATION  2014    Novasure endometrial ablation with D&C    laparoscopy for endometriosis      TUBAL LIGATION  2006    Laparoscopic     Family History     Problem Relation (Age of Onset)    Alzheimer's disease Maternal Grandmother    Hypertension Mother        Social  History Main Topics    Smoking status: Never Smoker    Smokeless tobacco: Never Used    Alcohol use 0.6 oz/week     1 Glasses of wine per week      Comment: social    Drug use: No    Sexual activity: Not Currently     Partners: Male     Birth control/ protection: Surgical, None      Comment: No partner x 1 year       Review of Systems   Constitutional: Positive for appetite change and unexpected weight change. Negative for chills and fever.   HENT: Negative for congestion, rhinorrhea and sore throat.    Respiratory: Negative for cough and shortness of breath.    Cardiovascular: Negative for chest pain, palpitations and leg swelling.   Gastrointestinal: Positive for abdominal pain, constipation, nausea and vomiting. Negative for abdominal distention and blood in stool.   Genitourinary: Negative for dysuria and hematuria.   Musculoskeletal: Negative for arthralgias and myalgias.   Skin: Negative for rash and wound.   Neurological: Positive for weakness. Negative for dizziness and numbness.   Hematological: Negative for adenopathy. Does not bruise/bleed easily.   Psychiatric/Behavioral: Negative for agitation and confusion.     Objective:     Vital Signs (Most Recent):  Temp: 96.9 °F (36.1 °C) (04/08/17 0400)  Pulse: (!) 121 (04/08/17 0700)  Resp: 16 (04/08/17 0400)  BP: (!) 140/83 (04/08/17 0400)  SpO2: (!) 92 % (04/07/17 1158) Vital Signs (24h Range):  Temp:  [96.6 °F (35.9 °C)-98.7 °F (37.1 °C)] 96.9 °F (36.1 °C)  Pulse:  [108-134] 121  Resp:  [12-16] 16  SpO2:  [92 %] 92 %  BP: (120-140)/(70-83) 140/83     Weight: 40.8 kg (90 lb)  Body mass index is 15.45 kg/(m^2).  Body surface area is 1.36 meters squared.      Intake/Output Summary (Last 24 hours) at 04/08/17 0843  Last data filed at 04/08/17 0600   Gross per 24 hour   Intake             2391 ml   Output             1100 ml   Net             1291 ml       Physical Exam   Constitutional: She is oriented to person, place, and time. She appears well-developed.    Fatigued, malnourished   HENT:   Head: Normocephalic.   Eyes: Pupils are equal, round, and reactive to light.   Neck: Neck supple. No JVD present.   Cardiovascular: Normal rate, regular rhythm and normal heart sounds.    No murmur heard.  Pulmonary/Chest: Effort normal. No respiratory distress. She has decreased breath sounds. She has no wheezes. She has no rhonchi. She has rales.   Abdominal:   middle vertical abdominal incision with staples. Venting gastrostomy tube noted in left abdominal quadrant. Hypoactive bowel sounds.   Musculoskeletal: She exhibits no edema.   Neurological: She is oriented to person, place, and time.   Skin: Skin is warm.   Psychiatric: She has a normal mood and affect.     Significant Labs:   Labs have been reviewed.    Diagnostic Results:  I have reviewed all pertinent imaging results/findings within the past 24 hours.

## 2017-04-09 LAB
ALBUMIN SERPL BCP-MCNC: 1.7 G/DL
ALP SERPL-CCNC: 90 U/L
ALT SERPL W/O P-5'-P-CCNC: 18 U/L
ANION GAP SERPL CALC-SCNC: 12 MMOL/L
AST SERPL-CCNC: 25 U/L
BILIRUB SERPL-MCNC: 1 MG/DL
BUN SERPL-MCNC: 9 MG/DL
CALCIUM SERPL-MCNC: 7.9 MG/DL
CHLORIDE SERPL-SCNC: 95 MMOL/L
CO2 SERPL-SCNC: 28 MMOL/L
CREAT SERPL-MCNC: 0.5 MG/DL
EST. GFR  (AFRICAN AMERICAN): >60 ML/MIN/1.73 M^2
EST. GFR  (NON AFRICAN AMERICAN): >60 ML/MIN/1.73 M^2
GLUCOSE SERPL-MCNC: 126 MG/DL
MAGNESIUM SERPL-MCNC: 1.6 MG/DL
PHOSPHATE SERPL-MCNC: 2.2 MG/DL
POTASSIUM SERPL-SCNC: 3 MMOL/L
PROT SERPL-MCNC: 5.5 G/DL
SODIUM SERPL-SCNC: 135 MMOL/L

## 2017-04-09 PROCEDURE — 63600175 PHARM REV CODE 636 W HCPCS: Performed by: STUDENT IN AN ORGANIZED HEALTH CARE EDUCATION/TRAINING PROGRAM

## 2017-04-09 PROCEDURE — 11000001 HC ACUTE MED/SURG PRIVATE ROOM

## 2017-04-09 PROCEDURE — 25000003 PHARM REV CODE 250: Performed by: STUDENT IN AN ORGANIZED HEALTH CARE EDUCATION/TRAINING PROGRAM

## 2017-04-09 PROCEDURE — 25000003 PHARM REV CODE 250

## 2017-04-09 PROCEDURE — 25000003 PHARM REV CODE 250: Performed by: INTERNAL MEDICINE

## 2017-04-09 PROCEDURE — C9113 INJ PANTOPRAZOLE SODIUM, VIA: HCPCS | Performed by: PHYSICIAN ASSISTANT

## 2017-04-09 PROCEDURE — 27100171 HC OXYGEN HIGH FLOW UP TO 24 HOURS

## 2017-04-09 PROCEDURE — 80053 COMPREHEN METABOLIC PANEL: CPT

## 2017-04-09 PROCEDURE — 63600175 PHARM REV CODE 636 W HCPCS: Performed by: PHYSICIAN ASSISTANT

## 2017-04-09 PROCEDURE — 99233 SBSQ HOSP IP/OBS HIGH 50: CPT | Mod: ,,, | Performed by: INTERNAL MEDICINE

## 2017-04-09 PROCEDURE — 83735 ASSAY OF MAGNESIUM: CPT

## 2017-04-09 PROCEDURE — 27000221 HC OXYGEN, UP TO 24 HOURS

## 2017-04-09 PROCEDURE — 63600175 PHARM REV CODE 636 W HCPCS: Performed by: INTERNAL MEDICINE

## 2017-04-09 PROCEDURE — 84100 ASSAY OF PHOSPHORUS: CPT

## 2017-04-09 RX ORDER — ACETAMINOPHEN 650 MG/1
650 SUPPOSITORY RECTAL EVERY 6 HOURS PRN
Status: DISCONTINUED | OUTPATIENT
Start: 2017-04-09 | End: 2017-04-10

## 2017-04-09 RX ORDER — ONDANSETRON 2 MG/ML
8 INJECTION INTRAMUSCULAR; INTRAVENOUS EVERY 8 HOURS PRN
Status: DISCONTINUED | OUTPATIENT
Start: 2017-04-09 | End: 2017-04-10

## 2017-04-09 RX ORDER — SODIUM CHLORIDE 9 MG/ML
INJECTION, SOLUTION INTRAVENOUS CONTINUOUS
Status: DISCONTINUED | OUTPATIENT
Start: 2017-04-09 | End: 2017-04-09

## 2017-04-09 RX ORDER — POTASSIUM CHLORIDE 7.45 MG/ML
40 INJECTION INTRAVENOUS ONCE
Status: COMPLETED | OUTPATIENT
Start: 2017-04-09 | End: 2017-04-09

## 2017-04-09 RX ADMIN — AMLODIPINE BESYLATE 10 MG: 10 TABLET ORAL at 08:04

## 2017-04-09 RX ADMIN — SODIUM CHLORIDE 1000 ML: 0.9 INJECTION, SOLUTION INTRAVENOUS at 02:04

## 2017-04-09 RX ADMIN — Medication 10 ML: at 12:04

## 2017-04-09 RX ADMIN — ACETAMINOPHEN 650 MG: 650 SUPPOSITORY RECTAL at 04:04

## 2017-04-09 RX ADMIN — FENTANYL 1 PATCH: 100 PATCH, EXTENDED RELEASE TRANSDERMAL at 05:04

## 2017-04-09 RX ADMIN — Medication 3 ML: at 06:04

## 2017-04-09 RX ADMIN — ASCORBIC ACID, VITAMIN A PALMITATE, CHOLECALCIFEROL, THIAMINE HYDROCHLORIDE, RIBOFLAVIN-5 PHOSPHATE SODIUM, PYRIDOXINE HYDROCHLORIDE, NIACINAMIDE, DEXPANTHENOL, ALPHA-TOCOPHEROL ACETATE, VITAMIN K1, FOLIC ACID, BIOTIN, CYANOCOBALAMIN: 200; 3300; 200; 6; 3.6; 6; 40; 15; 10; 150; 600; 60; 5 INJECTION, SOLUTION INTRAVENOUS at 02:04

## 2017-04-09 RX ADMIN — PROMETHAZINE HYDROCHLORIDE 12.5 MG: 25 INJECTION INTRAMUSCULAR; INTRAVENOUS at 07:04

## 2017-04-09 RX ADMIN — ONDANSETRON 8 MG: 2 INJECTION INTRAMUSCULAR; INTRAVENOUS at 12:04

## 2017-04-09 RX ADMIN — ACYCLOVIR SODIUM 410 MG: 50 INJECTION, SOLUTION INTRAVENOUS at 11:04

## 2017-04-09 RX ADMIN — PANTOPRAZOLE SODIUM 40 MG: 40 INJECTION, POWDER, FOR SOLUTION INTRAVENOUS at 08:04

## 2017-04-09 RX ADMIN — ONDANSETRON 8 MG: 2 INJECTION INTRAMUSCULAR; INTRAVENOUS at 01:04

## 2017-04-09 RX ADMIN — ACYCLOVIR SODIUM 410 MG: 50 INJECTION, SOLUTION INTRAVENOUS at 02:04

## 2017-04-09 RX ADMIN — ENOXAPARIN SODIUM 40 MG: 100 INJECTION SUBCUTANEOUS at 05:04

## 2017-04-09 RX ADMIN — Medication 10 ML: at 06:04

## 2017-04-09 RX ADMIN — Medication: at 09:04

## 2017-04-09 RX ADMIN — POTASSIUM CHLORIDE 40 MEQ: 10 INJECTION, SOLUTION INTRAVENOUS at 09:04

## 2017-04-09 RX ADMIN — PROMETHAZINE HYDROCHLORIDE 12.5 MG: 25 INJECTION INTRAMUSCULAR; INTRAVENOUS at 03:04

## 2017-04-09 RX ADMIN — SODIUM PHOSPHATE, MONOBASIC, MONOHYDRATE 39.99 MMOL: 276; 142 INJECTION, SOLUTION INTRAVENOUS at 09:04

## 2017-04-09 RX ADMIN — ACYCLOVIR SODIUM 410 MG: 50 INJECTION, SOLUTION INTRAVENOUS at 07:04

## 2017-04-09 RX ADMIN — Medication 3 ML: at 02:04

## 2017-04-09 NOTE — PLAN OF CARE
Problem: Patient Care Overview  Goal: Plan of Care Review  Outcome: Ongoing (interventions implemented as appropriate)  Patient AAOx3. Vital signs stable. No falls/injuries as pt calls for assistance. No signs and symptoms of infection noted. Skin integrity intact; mepilex to sacrum on bony prominence to prevent skin breakdown. Patient still complains of chronic pain despite Dilaudid PCA infusing. Nausea being controlled with around the clock antiemetics. Bed in lowest position, wheels locked, [facility]. Will follow with plan of care.

## 2017-04-09 NOTE — ASSESSMENT & PLAN NOTE
- secondary to pulmonary metastases, volume overload/pulmonary edema, and inability to take deep inspirations due to abdominal pain  - continue supplemental oxygen, wean off for SpO2 goal of >90%   - still with 9L requirement today  - incentive spirometry and PT/OT for atelectasis

## 2017-04-09 NOTE — ASSESSMENT & PLAN NOTE
- adhesions and ascites seen during Ex Lap with thickening of peritoneum and omental caking.   - POD 4   - continue NPO except water and ice chips  - biopsy sent, likely metastatic renal cell carcinoma  - venting gastrostomy tube has been placed, to gravity  - continue pain control  - TPN per surgery

## 2017-04-09 NOTE — ASSESSMENT & PLAN NOTE
- will need to follow up with Dr. Connelly in clinic after discharge for further management of cancer   - Ex lap revealed omental caking, ascites concerning for carcinomatosis   - venting gastrostomy tube has been placed.  - have discussed potential for hospice if further chemo not done   - short term goal to get out of hospital and get stronger  - appreciate palliative care's assistance with this patient.

## 2017-04-09 NOTE — NURSING
Spoke to Dr. Valdivia about patients HR running between 140's to 150's. MD ordered 1000 mL bolus of NS. Patient HR decreased to 118 at the moment. Offered patient Acetaminophen suppository; patient refused and is comfortable at the moment. Will continue to monitor patient.

## 2017-04-09 NOTE — PROGRESS NOTES
Progress Note  General Surgery    Admit Date: 3/31/2017  Post-operative Day: 4 Days Post-Op  Hospital Day: 10    SUBJECTIVE:     Follow-up For:  Procedure(s) (LRB):  LAPAROTOMY-EXPLORATORY (N/A)  GASTROSTOMY (N/A)    Pain controlled. g-tube in place draining. Still no BM/flatus. On TPN. Using PCA frequently for pain. Desires ice chips for comfort. On 9L O2    Scheduled Meds:   acyclovir  10 mg/kg Intravenous Q8H    enoxaparin  40 mg Subcutaneous Q24H    fat emulsion 20%  250 mL Intravenous Daily    fentaNYL  1 patch Transdermal Q72H    pantoprazole  40 mg Intravenous Daily    potassium chloride  40 mEq Intravenous Once    sodium chloride 0.9%  10 mL Intravenous Q6H    sodium chloride 0.9%  3 mL Intravenous Q8H    sodium phosphate IVPB  39.99 mmol Intravenous Once     Continuous Infusions:   sodium chloride 0.9% 75 mL/hr at 04/07/17 1023    [START ON 4/10/2017] Amino acid 5% - dextrose 20% (CLINIMIX-E) solution with additives (1L provides 50 gm AA, 200 gm CHO (680 kcal/L dextrose), Na 35, K 30, Mg 5, Ca 4.5, Acetate 80, Cl 39, Phos 15)      hydromorphone in 0.9 % NaCl 6 mg/30 ml       PRN Meds:naloxone, ondansetron, promethazine (PHENERGAN) IVPB, Flushing PICC Protocol **AND** sodium chloride 0.9% **AND** sodium chloride 0.9%    Review of patient's allergies indicates:   Allergen Reactions    No known drug allergies        Review of Systems  See prior notes for additional details not listed in HPI      OBJECTIVE:     Vital Signs (Most Recent)  Temp: 98.1 °F (36.7 °C) (04/09/17 0805)  Pulse: (!) 122 (04/09/17 0805)  Resp: 18 (04/09/17 0805)  BP: (!) 148/87 (04/09/17 0805)  SpO2: 98 % (04/09/17 0805)    Vital Signs Range (Last 24H):  Temp:  [97.3 °F (36.3 °C)-99 °F (37.2 °C)]   Pulse:  [101-128]   Resp:  [18]   BP: (126-148)/(83-92)   SpO2:  [96 %-98 %]     I & O (Last 24H):    Intake/Output Summary (Last 24 hours) at 04/09/17 0938  Last data filed at 04/09/17 0400   Gross per 24 hour   Intake              1430 ml   Output              975 ml   Net              455 ml       Physical Exam:  General: NAD,   Neuro: aaox4, no obvious focal deficit   Respiratory: resps even unlabored  Cardiac: cap refill <2 sec, RRR  Abdomen: scaphoid abdomen, milo tender, midline incision. Incision c/d/i. Decreased BS.  G tube to gravity.   Extremities: Warm dry and intact      Lab Results   Component Value Date    WBC 7.12 04/08/2017    HGB 12.2 04/08/2017    HCT 36.7 (L) 04/08/2017    MCV 81 (L) 04/08/2017     04/08/2017     Lab Results   Component Value Date    CREATININE 0.5 04/09/2017    BUN 9 04/09/2017     (L) 04/09/2017    K 3.0 (L) 04/09/2017    CL 95 04/09/2017    CO2 28 04/09/2017    CALCIUM 7.9 (L) 04/09/2017    PHOS 2.2 (L) 04/09/2017    MG 1.6 04/09/2017         Diagnostic Results:  Labs: Reviewed    ASSESSMENT/PLAN:     Nikia Lamas is a 51 y.o. female with metastatic renal cell carcinoma s/p left radical nephrectomy currently on cabozantinib, presenting with pSBO POD4 ex lap, omental caking, ascites concerning for carcinomatosis and G tube placement    - G tube to gravity   - Ambulate  - NPO - consider spare ice chips / liquids for comfort and keep G tube to gravity  - TPN  - dilaudid PCA wean as tolerated; consider Tylenol scheduled   - protonix daily  - Home amlodipine  - OOBTC/Ambulate  - Strict I & O's  - DVT prophylaxis  - cont care per heme/onc    Geovanna Lopez PGY1

## 2017-04-09 NOTE — PROGRESS NOTES
[facility]  Hematology/Oncology  Progress Note    Patient Name: Nikia Lamas  Admission Date: 3/31/2017  Hospital Length of Stay: 9 days  Code Status: Full Code     Subjective:       Interval history:  NAEON. Still no BM/flatus and remains 9L O2. Turned down to 2L and patient sating >92%. Try to wean off today. Still with significant pain requiring PCA. Will try to wean off PCA tomorrow         Oncology Treatment Plan:   [No treatment plan]    Medications:  Continuous Infusions:   sodium chloride 0.9% 75 mL/hr at 17 1023    Amino acid 5% - dextrose 20% (CLINIMIX-E) solution with additives (1L provides 50 gm AA, 200 gm CHO (680 kcal/L dextrose), Na 35, K 30, Mg 5, Ca 4.5, Acetate 80, Cl 39, Phos 15)      hydromorphone in 0.9 % NaCl 6 mg/30 ml       Scheduled Meds:   acyclovir  10 mg/kg Intravenous Q8H    amlodipine  10 mg Oral Daily    enoxaparin  40 mg Subcutaneous Q24H    fentaNYL  1 patch Transdermal Q72H    pantoprazole  40 mg Intravenous Daily    sodium chloride 0.9%  10 mL Intravenous Q6H    sodium chloride 0.9%  3 mL Intravenous Q8H     PRN Meds:naloxone, ondansetron, promethazine (PHENERGAN) IVPB, Flushing PICC Protocol **AND** sodium chloride 0.9% **AND** sodium chloride 0.9%     Review of patient's allergies indicates:   Allergen Reactions    No known drug allergies         Past Medical History:   Diagnosis Date    Abnormal Pap smear of cervix 10/2015    + HR HPV    Anemia     hx chronic anemia, improved after ablation    Herpes simplex without mention of complication      Past Surgical History:   Procedure Laterality Date     SECTION  ,     DILATION AND CURETTAGE OF UTERUS      ENDOMETRIAL ABLATION  2014    Novasure endometrial ablation with D&C    laparoscopy for endometriosis      TUBAL LIGATION  2006    Laparoscopic     Family History     Problem Relation (Age of Onset)    Alzheimer's disease Maternal Grandmother    Hypertension  Mother        Social History Main Topics    Smoking status: Never Smoker    Smokeless tobacco: Never Used    Alcohol use 0.6 oz/week     1 Glasses of wine per week      Comment: social    Drug use: No    Sexual activity: Not Currently     Partners: Male     Birth control/ protection: Surgical, None      Comment: No partner x 1 year       Review of Systems   Constitutional: Positive for appetite change and unexpected weight change. Negative for chills and fever.   HENT: Negative for congestion, rhinorrhea and sore throat.    Respiratory: Negative for cough and shortness of breath.    Cardiovascular: Negative for chest pain, palpitations and leg swelling.   Gastrointestinal: Positive for abdominal pain, constipation, nausea and vomiting. Negative for abdominal distention and blood in stool.   Genitourinary: Negative for dysuria and hematuria.   Musculoskeletal: Negative for arthralgias and myalgias.   Skin: Negative for rash and wound.   Neurological: Positive for weakness. Negative for dizziness and numbness.   Hematological: Negative for adenopathy. Does not bruise/bleed easily.   Psychiatric/Behavioral: Negative for agitation and confusion.     Objective:     Vital Signs (Most Recent):  Temp: 97.3 °F (36.3 °C) (04/09/17 0030)  Pulse: (!) 128 (04/09/17 0500)  Resp: 18 (04/09/17 0030)  BP: (!) 135/91 (04/09/17 0030)  SpO2: 97 % (04/08/17 1201) Vital Signs (24h Range):  Temp:  [97.3 °F (36.3 °C)-98.6 °F (37 °C)] 97.3 °F (36.3 °C)  Pulse:  [101-128] 128  Resp:  [18] 18  SpO2:  [97 %-98 %] 97 %  BP: (128-148)/(80-92) 135/91     Weight: 40.8 kg (90 lb)  Body mass index is 15.45 kg/(m^2).  Body surface area is 1.36 meters squared.      Intake/Output Summary (Last 24 hours) at 04/09/17 0636  Last data filed at 04/08/17 1815   Gross per 24 hour   Intake                0 ml   Output             1325 ml   Net            -1325 ml       Physical Exam   Constitutional: She is oriented to person, place, and time. She appears  well-developed.   Fatigued, malnourished   HENT:   Head: Normocephalic.   Eyes: Pupils are equal, round, and reactive to light.   Neck: Neck supple. No JVD present.   Cardiovascular: Normal rate, regular rhythm and normal heart sounds.    No murmur heard.  Pulmonary/Chest: Effort normal. No respiratory distress. She has decreased breath sounds. She has no wheezes. She has no rhonchi. She has rales.   Abdominal:   middle vertical abdominal incision with staples. Venting gastrostomy tube noted in left abdominal quadrant. Hypoactive bowel sounds.   Musculoskeletal: She exhibits no edema.   Neurological: She is oriented to person, place, and time.   Skin: Skin is warm.   Psychiatric: She has a normal mood and affect.     Significant Labs:   Labs have been reviewed.    Recent Labs  Lab 04/09/17  0640   CALCIUM 7.9*   PROT 5.5*   *   K 3.0*   CO2 28   CL 95   BUN 9   CREATININE 0.5   ALKPHOS 90   ALT 18   AST 25   BILITOT 1.0     Diagnostic Results:  I have reviewed all pertinent imaging results/findings within the past 24 hours.    Assessment/Plan:     * Small bowel obstruction  - adhesions and ascites seen during Ex Lap with thickening of peritoneum and omental caking.   - POD 4   - continue NPO except water and ice chips  - biopsy sent, likely metastatic renal cell carcinoma  - venting gastrostomy tube has been placed, to gravity  - continue pain control  - TPN per surgery    Metastatic renal cell carcinoma  - will need to follow up with Dr. Connelly in clinic after discharge for further management of cancer   - Ex lap revealed omental caking, ascites concerning for carcinomatosis   - venting gastrostomy tube has been placed.  - have discussed potential for hospice if further chemo not done   - short term goal to get out of hospital and get stronger  - appreciate palliative care's assistance with this patient.    Bone metastases  - Continue pain control, palliative care helping with pain management    Lung  metastases  - see above    Acute respiratory failure with hypoxia  - secondary to pulmonary metastases, volume overload/pulmonary edema, and inability to take deep inspirations due to abdominal pain  - continue supplemental oxygen, wean off for SpO2 goal of >90%   - still with 9L requirement today  - incentive spirometry and PT/OT for atelectasis    Protein-calorie malnutrition, severe  - secondary to advanced cancer, bowel obstruction, and peritoneal carcinomatosis with omental caking  - venting gastrostomy tube has been placed  - continue total parenteral nutrition per surgery.           Tricia Charles MD  Hematology/Oncology  [facility]      ATTENDING NOTE, ONCOLOGY INPATIENT TEAM    As above; events of last 24 hours noted.  Patient seen and examined, chart reviewed.  Appearsuncomfortable, complaining of abdominal pain. .  Lungs are clear to auscultation.  Abdomen is soft,with mild diffuse tenderenss.  Bowel sounds are absent.  Labs reviewed.    PLAN  Follow electrolytes daily.  Continue TPN and comfort feedings; will leave the G tube to gravity.  Continue fentanyl and dilaudid through the PCA; we will reassess her analgesic needs in am.  Patient encourage to ambulate.      Cecilio Monsalve MD

## 2017-04-10 LAB
ALBUMIN SERPL BCP-MCNC: 1.7 G/DL
ALP SERPL-CCNC: 97 U/L
ALT SERPL W/O P-5'-P-CCNC: 20 U/L
ANION GAP SERPL CALC-SCNC: 10 MMOL/L
ANISOCYTOSIS BLD QL SMEAR: SLIGHT
AST SERPL-CCNC: 24 U/L
BASOPHILS # BLD AUTO: 0.01 K/UL
BASOPHILS NFR BLD: 0.1 %
BILIRUB SERPL-MCNC: 2 MG/DL
BUN SERPL-MCNC: 11 MG/DL
CALCIUM SERPL-MCNC: 8.3 MG/DL
CHLORIDE SERPL-SCNC: 95 MMOL/L
CO2 SERPL-SCNC: 29 MMOL/L
CREAT SERPL-MCNC: 0.5 MG/DL
DIFFERENTIAL METHOD: ABNORMAL
EOSINOPHIL # BLD AUTO: 0.5 K/UL
EOSINOPHIL NFR BLD: 6.2 %
ERYTHROCYTE [DISTWIDTH] IN BLOOD BY AUTOMATED COUNT: 21.8 %
EST. GFR  (AFRICAN AMERICAN): >60 ML/MIN/1.73 M^2
EST. GFR  (NON AFRICAN AMERICAN): >60 ML/MIN/1.73 M^2
GLUCOSE SERPL-MCNC: 112 MG/DL
HCT VFR BLD AUTO: 34.3 %
HGB BLD-MCNC: 11.4 G/DL
LYMPHOCYTES # BLD AUTO: 0.6 K/UL
LYMPHOCYTES NFR BLD: 7.3 %
MAGNESIUM SERPL-MCNC: 1.7 MG/DL
MCH RBC QN AUTO: 27.1 PG
MCHC RBC AUTO-ENTMCNC: 33.2 %
MCV RBC AUTO: 82 FL
MONOCYTES # BLD AUTO: 1.1 K/UL
MONOCYTES NFR BLD: 13 %
NEUTROPHILS # BLD AUTO: 6.2 K/UL
NEUTROPHILS NFR BLD: 73.4 %
OVALOCYTES BLD QL SMEAR: ABNORMAL
PHOSPHATE SERPL-MCNC: 2.3 MG/DL
PLATELET # BLD AUTO: 222 K/UL
PLATELET BLD QL SMEAR: ABNORMAL
PMV BLD AUTO: 9.7 FL
POIKILOCYTOSIS BLD QL SMEAR: SLIGHT
POTASSIUM SERPL-SCNC: 3.5 MMOL/L
PROT SERPL-MCNC: 5.8 G/DL
RBC # BLD AUTO: 4.21 M/UL
SODIUM SERPL-SCNC: 134 MMOL/L
WBC # BLD AUTO: 8.51 K/UL

## 2017-04-10 PROCEDURE — 25000003 PHARM REV CODE 250: Performed by: INTERNAL MEDICINE

## 2017-04-10 PROCEDURE — 63600175 PHARM REV CODE 636 W HCPCS: Performed by: STUDENT IN AN ORGANIZED HEALTH CARE EDUCATION/TRAINING PROGRAM

## 2017-04-10 PROCEDURE — 84100 ASSAY OF PHOSPHORUS: CPT

## 2017-04-10 PROCEDURE — 99233 SBSQ HOSP IP/OBS HIGH 50: CPT | Mod: ,,, | Performed by: INTERNAL MEDICINE

## 2017-04-10 PROCEDURE — 25000003 PHARM REV CODE 250: Performed by: STUDENT IN AN ORGANIZED HEALTH CARE EDUCATION/TRAINING PROGRAM

## 2017-04-10 PROCEDURE — 83735 ASSAY OF MAGNESIUM: CPT

## 2017-04-10 PROCEDURE — 80053 COMPREHEN METABOLIC PANEL: CPT

## 2017-04-10 PROCEDURE — 63600175 PHARM REV CODE 636 W HCPCS: Performed by: PHYSICIAN ASSISTANT

## 2017-04-10 PROCEDURE — C9113 INJ PANTOPRAZOLE SODIUM, VIA: HCPCS | Performed by: PHYSICIAN ASSISTANT

## 2017-04-10 PROCEDURE — 85025 COMPLETE CBC W/AUTO DIFF WBC: CPT

## 2017-04-10 PROCEDURE — 97803 MED NUTRITION INDIV SUBSEQ: CPT

## 2017-04-10 PROCEDURE — 11000001 HC ACUTE MED/SURG PRIVATE ROOM

## 2017-04-10 PROCEDURE — 63600175 PHARM REV CODE 636 W HCPCS: Performed by: INTERNAL MEDICINE

## 2017-04-10 RX ORDER — ONDANSETRON 2 MG/ML
8 INJECTION INTRAMUSCULAR; INTRAVENOUS
Status: DISCONTINUED | OUTPATIENT
Start: 2017-04-10 | End: 2017-04-19 | Stop reason: HOSPADM

## 2017-04-10 RX ORDER — RAMELTEON 8 MG/1
8 TABLET ORAL NIGHTLY PRN
Status: DISCONTINUED | OUTPATIENT
Start: 2017-04-10 | End: 2017-04-10

## 2017-04-10 RX ADMIN — ASCORBIC ACID, VITAMIN A PALMITATE, CHOLECALCIFEROL, THIAMINE HYDROCHLORIDE, RIBOFLAVIN-5 PHOSPHATE SODIUM, PYRIDOXINE HYDROCHLORIDE, NIACINAMIDE, DEXPANTHENOL, ALPHA-TOCOPHEROL ACETATE, VITAMIN K1, FOLIC ACID, BIOTIN, CYANOCOBALAMIN: 200; 3300; 200; 6; 3.6; 6; 40; 15; 10; 150; 600; 60; 5 INJECTION, SOLUTION INTRAVENOUS at 03:04

## 2017-04-10 RX ADMIN — I.V. FAT EMULSION 250 ML: 20 EMULSION INTRAVENOUS at 02:04

## 2017-04-10 RX ADMIN — ONDANSETRON 8 MG: 2 INJECTION INTRAMUSCULAR; INTRAVENOUS at 08:04

## 2017-04-10 RX ADMIN — ONDANSETRON 8 MG: 2 INJECTION INTRAMUSCULAR; INTRAVENOUS at 04:04

## 2017-04-10 RX ADMIN — Medication: at 05:04

## 2017-04-10 RX ADMIN — ACYCLOVIR SODIUM 410 MG: 50 INJECTION, SOLUTION INTRAVENOUS at 02:04

## 2017-04-10 RX ADMIN — Medication: at 08:04

## 2017-04-10 RX ADMIN — Medication 3 ML: at 02:04

## 2017-04-10 RX ADMIN — Medication 10 ML: at 05:04

## 2017-04-10 RX ADMIN — Medication 10 ML: at 11:04

## 2017-04-10 RX ADMIN — ENOXAPARIN SODIUM 40 MG: 100 INJECTION SUBCUTANEOUS at 05:04

## 2017-04-10 RX ADMIN — Medication: at 07:04

## 2017-04-10 RX ADMIN — PANTOPRAZOLE SODIUM 40 MG: 40 INJECTION, POWDER, FOR SOLUTION INTRAVENOUS at 08:04

## 2017-04-10 NOTE — ASSESSMENT & PLAN NOTE
- secondary to advanced cancer, bowel obstruction, and peritoneal carcinomatosis with omental caking  - venting gastrostomy tube has been placed  - continue total parenteral nutrition per surgery  - electrolyte replacement while on TPN

## 2017-04-10 NOTE — SUBJECTIVE & OBJECTIVE
Interval history:  NAEON. Still no BM/flatus.   Tachycardic, responded to 1L fluid bolus yesterday. Now on 2-4L NC. Still with pain and trouble sleeping.       Oncology Treatment Plan:   [No treatment plan]    Medications:  Continuous Infusions:   Amino acid 5% - dextrose 20% (CLINIMIX-E) solution with additives (1L provides 50 gm AA, 200 gm CHO (680 kcal/L dextrose), Na 35, K 30, Mg 5, Ca 4.5, Acetate 80, Cl 39, Phos 15) 40 mL/hr at 04/10/17 0306    hydromorphone in 0.9 % NaCl 6 mg/30 ml       Scheduled Meds:   enoxaparin  40 mg Subcutaneous Q24H    fat emulsion 20%  250 mL Intravenous Daily    fentaNYL  1 patch Transdermal Q72H    pantoprazole  40 mg Intravenous Daily    sodium chloride 0.9%  10 mL Intravenous Q6H    sodium chloride 0.9%  3 mL Intravenous Q8H     PRN Meds:acetaminophen, naloxone, ondansetron, promethazine (PHENERGAN) IVPB, Flushing PICC Protocol **AND** sodium chloride 0.9% **AND** sodium chloride 0.9%     Review of patient's allergies indicates:   Allergen Reactions    No known drug allergies         Past Medical History:   Diagnosis Date    Abnormal Pap smear of cervix 10/2015    + HR HPV    Anemia     hx chronic anemia, improved after ablation    Herpes simplex without mention of complication      Past Surgical History:   Procedure Laterality Date     SECTION  ,     DILATION AND CURETTAGE OF UTERUS      ENDOMETRIAL ABLATION  2014    Novasure endometrial ablation with D&C    laparoscopy for endometriosis      TUBAL LIGATION  2006    Laparoscopic     Family History     Problem Relation (Age of Onset)    Alzheimer's disease Maternal Grandmother    Hypertension Mother        Social History Main Topics    Smoking status: Never Smoker    Smokeless tobacco: Never Used    Alcohol use 0.6 oz/week     1 Glasses of wine per week      Comment: social    Drug use: No    Sexual activity: Not Currently     Partners: Male     Birth control/ protection: Surgical,  None      Comment: No partner x 1 year       Review of Systems   Constitutional: Negative for chills and fever.   HENT: Negative for congestion.    Respiratory: Negative for cough and shortness of breath.    Cardiovascular: Negative for chest pain, palpitations and leg swelling.   Gastrointestinal: Positive for abdominal pain, constipation and nausea. Negative for abdominal distention and blood in stool.   Genitourinary: Negative for dysuria and hematuria.   Skin: Negative for rash and wound.     Objective:     Vital Signs (Most Recent):  Temp: 98.8 °F (37.1 °C) (04/10/17 0021)  Pulse: (!) 117 (04/10/17 0700)  Resp: 18 (04/10/17 0021)  BP: 133/84 (04/10/17 0021)  SpO2: (!) 92 % (04/10/17 0021) Vital Signs (24h Range):  Temp:  [98.1 °F (36.7 °C)-99.6 °F (37.6 °C)] 98.8 °F (37.1 °C)  Pulse:  [114-129] 117  Resp:  [18] 18  SpO2:  [92 %-98 %] 92 %  BP: (128-148)/(81-89) 133/84     Weight: 40.8 kg (90 lb)  Body mass index is 15.45 kg/(m^2).  Body surface area is 1.36 meters squared.      Intake/Output Summary (Last 24 hours) at 04/10/17 0756  Last data filed at 04/09/17 1721   Gross per 24 hour   Intake              120 ml   Output              325 ml   Net             -205 ml       Physical Exam   Constitutional: She is oriented to person, place, and time. She appears well-developed.   Fatigued, malnourished   HENT:   Head: Normocephalic.   Eyes: Pupils are equal, round, and reactive to light.   Neck: Neck supple. No JVD present.   Cardiovascular: Normal rate, regular rhythm and normal heart sounds.    No murmur heard.  Pulmonary/Chest: Effort normal. No respiratory distress. She has decreased breath sounds. She has no wheezes. She has no rhonchi.   Abdominal:   middle vertical abdominal incision with staples. Venting gastrostomy tube noted in left abdominal quadrant. Hypoactive bowel sounds.   Musculoskeletal: She exhibits no edema.   Neurological: She is oriented to person, place, and time.   Skin: Skin is warm.    Psychiatric: She has a normal mood and affect.     Significant Labs:   Labs have been reviewed.    Recent Labs  Lab 04/10/17  0600   CALCIUM 8.3*   PROT 5.8*   *   K 3.5   CO2 29   CL 95   BUN 11   CREATININE 0.5   ALKPHOS 97   ALT 20   AST 24   BILITOT 2.0*     Diagnostic Results:  I have reviewed all pertinent imaging results/findings within the past 24 hours.

## 2017-04-10 NOTE — ASSESSMENT & PLAN NOTE
- secondary to pulmonary metastases, volume overload/pulmonary edema, and inability to take deep inspirations due to abdominal pain  - continue supplemental oxygen, wean off for SpO2 goal of >90%   - requiring 2-4L NC  - incentive spirometry and PT/OT for atelectasis

## 2017-04-10 NOTE — PROGRESS NOTES
0[facility]  Hematology/Oncology  Progress Note    Patient Name: Nikia Lamas  Admission Date: 3/31/2017  Hospital Length of Stay: 10 days  Code Status: Full Code      Subjective:         Interval history:  NAEON. Still no BM/flatus.   Tachycardic, responded to 1L fluid bolus yesterday. Now on 2-4L NC. Still with pain and trouble sleeping.       Oncology Treatment Plan:   [No treatment plan]    Medications:  Continuous Infusions:   Amino acid 5% - dextrose 20% (CLINIMIX-E) solution with additives (1L provides 50 gm AA, 200 gm CHO (680 kcal/L dextrose), Na 35, K 30, Mg 5, Ca 4.5, Acetate 80, Cl 39, Phos 15) 40 mL/hr at 04/10/17 0306    hydromorphone in 0.9 % NaCl 6 mg/30 ml       Scheduled Meds:   enoxaparin  40 mg Subcutaneous Q24H    fat emulsion 20%  250 mL Intravenous Daily    fentaNYL  1 patch Transdermal Q72H    pantoprazole  40 mg Intravenous Daily    sodium chloride 0.9%  10 mL Intravenous Q6H    sodium chloride 0.9%  3 mL Intravenous Q8H     PRN Meds:acetaminophen, naloxone, ondansetron, promethazine (PHENERGAN) IVPB, Flushing PICC Protocol **AND** sodium chloride 0.9% **AND** sodium chloride 0.9%     Review of patient's allergies indicates:   Allergen Reactions    No known drug allergies         Past Medical History:   Diagnosis Date    Abnormal Pap smear of cervix 10/2015    + HR HPV    Anemia     hx chronic anemia, improved after ablation    Herpes simplex without mention of complication      Past Surgical History:   Procedure Laterality Date     SECTION  ,     DILATION AND CURETTAGE OF UTERUS      ENDOMETRIAL ABLATION  2014    Novasure endometrial ablation with D&C    laparoscopy for endometriosis      TUBAL LIGATION  2006    Laparoscopic     Family History     Problem Relation (Age of Onset)    Alzheimer's disease Maternal Grandmother    Hypertension Mother        Social History Main Topics    Smoking status: Never Smoker    Smokeless  tobacco: Never Used    Alcohol use 0.6 oz/week     1 Glasses of wine per week      Comment: social    Drug use: No    Sexual activity: Not Currently     Partners: Male     Birth control/ protection: Surgical, None      Comment: No partner x 1 year       Review of Systems   Constitutional: Negative for chills and fever.   HENT: Negative for congestion.    Respiratory: Negative for cough and shortness of breath.    Cardiovascular: Negative for chest pain, palpitations and leg swelling.   Gastrointestinal: Positive for abdominal pain, constipation and nausea. Negative for abdominal distention and blood in stool.   Genitourinary: Negative for dysuria and hematuria.   Skin: Negative for rash and wound.     Objective:     Vital Signs (Most Recent):  Temp: 98.8 °F (37.1 °C) (04/10/17 0021)  Pulse: (!) 117 (04/10/17 0700)  Resp: 18 (04/10/17 0021)  BP: 133/84 (04/10/17 0021)  SpO2: (!) 92 % (04/10/17 0021) Vital Signs (24h Range):  Temp:  [98.1 °F (36.7 °C)-99.6 °F (37.6 °C)] 98.8 °F (37.1 °C)  Pulse:  [114-129] 117  Resp:  [18] 18  SpO2:  [92 %-98 %] 92 %  BP: (128-148)/(81-89) 133/84     Weight: 40.8 kg (90 lb)  Body mass index is 15.45 kg/(m^2).  Body surface area is 1.36 meters squared.      Intake/Output Summary (Last 24 hours) at 04/10/17 0756  Last data filed at 04/09/17 1721   Gross per 24 hour   Intake              120 ml   Output              325 ml   Net             -205 ml       Physical Exam   Constitutional: She is oriented to person, place, and time. She appears well-developed.   Fatigued, malnourished   HENT:   Head: Normocephalic.   Eyes: Pupils are equal, round, and reactive to light.   Neck: Neck supple. No JVD present.   Cardiovascular: Normal rate, regular rhythm and normal heart sounds.    No murmur heard.  Pulmonary/Chest: Effort normal. No respiratory distress. She has decreased breath sounds. She has no wheezes. She has no rhonchi.   Abdominal:   middle vertical abdominal incision with staples.  Venting gastrostomy tube noted in left abdominal quadrant. Hypoactive bowel sounds.   Musculoskeletal: She exhibits no edema.   Neurological: She is oriented to person, place, and time.   Skin: Skin is warm.   Psychiatric: She has a normal mood and affect.     Significant Labs:   Labs have been reviewed.    Recent Labs  Lab 04/10/17  0600   CALCIUM 8.3*   PROT 5.8*   *   K 3.5   CO2 29   CL 95   BUN 11   CREATININE 0.5   ALKPHOS 97   ALT 20   AST 24   BILITOT 2.0*     Diagnostic Results:  I have reviewed all pertinent imaging results/findings within the past 24 hours.    Assessment/Plan:     * Small bowel obstruction  - adhesions and ascites seen during Ex Lap with thickening of peritoneum and omental caking.   - POD 5, still without flatus/BM   - continue NPO except water and ice chips  - biopsy sent, likely metastatic renal cell carcinoma  - venting gastrostomy tube has been placed, to gravity  - continue pain control  - TPN    Metastatic renal cell carcinoma  - will need to follow up with Dr. Connelly in clinic after discharge for further management of cancer   - Ex lap revealed omental caking, ascites concerning for carcinomatosis   - venting gastrostomy tube has been placed.  - have discussed potential for hospice if further chemo not done   - short term goal to get out of hospital and get stronger  - appreciate palliative care's assistance with this patient.    Bone metastases  - Continue pain control, palliative care helping with pain management    Retroperitoneal lymphadenopathy  See above    Lung metastases  - see above    Acute respiratory failure with hypoxia  - secondary to pulmonary metastases, volume overload/pulmonary edema, and inability to take deep inspirations due to abdominal pain  - continue supplemental oxygen, wean off for SpO2 goal of >90%   - requiring 2-4L NC  - incentive spirometry and PT/OT for atelectasis    Protein-calorie malnutrition, severe  - secondary to advanced cancer, bowel  obstruction, and peritoneal carcinomatosis with omental caking  - venting gastrostomy tube has been placed  - continue total parenteral nutrition per surgery  - electrolyte replacement while on TPN      Dispo: Pending pain control, will go home with Home health PT/OT    Tricia Charles MD  Hematology/Oncology  [facility]        ATTENDING NOTE, ONCOLOGY INPATIENT TEAM    As above; events of last 24 hours noted.  Patient seen and examined, chart reviewed.  Appears more comfortable today, in NAD.  Lungs are clear to auscultation.  Abdomen is soft, nontender.  Sluggish bowel sounds are now present  Labs reviewed.    PLAN  Follow electrolytes daily.  Increase ambulation.  Continue TPN.  D/C telemetry and try to wean off O2.  Continue enoxaparin  Continue fentanyl and prn IV hydromorphone.  We will follow.  Her questions were answered to her satisfaction.      Cecilio Monsalve MD

## 2017-04-10 NOTE — PROGRESS NOTES
Progress Note  Palliative Care      Consult Requested By: Cecilio Monsalve MD  Reason for Consult: Symptom Management      ASSESSMENT/PLAN:     A/P: 51 yr old woman with metastatic renal cell ca to the lungs, now with peritoneal carcinomatosis, on TPN, admitted for SBO. Pal med consult for goals of care and symptom management.     Plan/Recommendations:     1. Pain: Nociceptive somatic + visceral due to underlying cancer  - Cont fentanyl patch 100 mcg q72  - Given that patient's g-tube unable to be clamped and is currently draining to gravity, patient would not benefit from oral medications. As such, recommend continuing PCA both now and potentially at discharge (patient has PICC for TPN) if able to be arranged   - Would recommend increasing PCA dilaudid dosage to .4 mg PRN Q10 minute lock out (max 2.4 mg/hr)  - If patient's code status changes and goal of care becomes focused on comfort, rather than aggressive treatment, would recommend starting 6 mg IV decadron daily, which helps with inflammation/edema in patients with terminal bowel obstruction. However, given that patient is currently on TPN would not recommend this at this time currently as she is already high risk for infection secondary to such.     2. Constipation: Bowel obstruction, recs per surgical team, decompressive PEG in place, draining    3. Nausea: Recommend scheduled Zofran 8 mg IV Q8 and PRN phenergan suppositories      4. Goals of Care: Long conversation previously with Dr. Bautista. Plan for time limited trial (2-4 weeks max) with TPN at home to see if patient a candidate for further therapies. It has been explained the low likelihood of this. Patient and family appear to be in understanding. Family states patient is not ready to make a decision to stop TPN and to not pursue further immunotherapy.  - Did not address code status today as patient's family does not yet seem amenable to this discussion (patient still relatively well appearing,  "interested in going for a walk, cognitively intact, etc)   - Will revisit this daily as palliative team continues to follow    Discussed and examined patient with staff, Dr. Perez. Attestation to follow.    Sisi Albarran MD  IM PGY-2    SUBJECTIVE:     Interval History: NAEON, but still has not passed BM or flatus. Says her night was "ok" but pain control is still an issue, particularly right side. Counts down minutes between PCA doses of medication. G tube unable to be clamped over weekend (nausea) so patient remains on PCA.     HPI: 50 yo F with PMHx renal cell carcinoma with mets to liver and bone s/p L nephrectomy 2017 presents with 8 d constipation with associated nausea/vomiting intermittently for past 7 days. She notes difficulty keeping down any food originally but had been using some Ensure supplements. Had been doing daily PEG 17 g but decided to try a magnesium citrate due to claims to help with abdominal cramping. She had no relief with this. Tried lactulose with increase in abd pain and cramping and a couple limited watery bowel movements after using this. Notes approx 4 lb weight loss over past week or so. She has been on cabozantinib 60 mg po qd since 17 with no issues with bowel obstruction since starting chemo. Has abdominal procedures in [facility] including recent L nephrectomy, 2 C sections, and tubal ligation.     Past Medical History:   Diagnosis Date    Abnormal Pap smear of cervix 10/2015    + HR HPV    Anemia     hx chronic anemia, improved after ablation    Herpes simplex without mention of complication      Past Surgical History:   Procedure Laterality Date     SECTION  ,     DILATION AND CURETTAGE OF UTERUS      ENDOMETRIAL ABLATION  2014    Novasure endometrial ablation with D&C    laparoscopy for endometriosis      TUBAL LIGATION  2006    Laparoscopic     Family History   Problem Relation Age of Onset    Alzheimer's disease Maternal Grandmother  "    Hypertension Mother     Breast cancer Neg Hx     Colon cancer Neg Hx     Ovarian cancer Neg Hx     Diabetes Neg Hx     Stroke Neg Hx      Review of patient's allergies indicates:   Allergen Reactions    No known drug allergies        Medications:  Continuous Infusions:    Amino acid 5% - dextrose 20% (CLINIMIX-E) solution with additives (1L provides 50 gm AA, 200 gm CHO (680 kcal/L dextrose), Na 35, K 30, Mg 5, Ca 4.5, Acetate 80, Cl 39, Phos 15) 40 mL/hr at 04/10/17 0306    hydromorphone in 0.9 % NaCl 6 mg/30 ml       Scheduled:    enoxaparin  40 mg Subcutaneous Q24H    fat emulsion 20%  250 mL Intravenous Daily    fentaNYL  1 patch Transdermal Q72H    ondansetron  8 mg Intravenous Q8H    pantoprazole  40 mg Intravenous Daily    sodium chloride 0.9%  10 mL Intravenous Q6H    sodium chloride 0.9%  3 mL Intravenous Q8H     PRN Meds: naloxone, promethazine (PHENERGAN) IVPB, ramelteon, Flushing PICC Protocol **AND** sodium chloride 0.9% **AND** sodium chloride 0.9%      OBJECTIVE:       Physical Exam:  Vitals: Temp: 98 °F (36.7 °C) (04/10/17 1200)  Pulse: (!) 119 (04/10/17 1200)  Resp: 18 (04/10/17 1200)  BP: 127/80 (04/10/17 1200)  SpO2: (!) 92 % (04/10/17 1200)  General: Alert, comfortable, no acute distress.   Pulmonary: Non labored,clear to auscultation anteriorly.   Cardiac: normal S1 & S2 w/o rubs/murmurs/gallops.   Abdominal: G tube to gravity, draining. Surgical incision no drainage. Hypoactive bowel sounds.   Extremities: Moves all extremities x 4. No peripheral edema. 2+ pulses.  Skin: No jaundice, rashes, or visible lesions.  Neurological: Alert and oriented x 4. No focal neuro deficits.     Labs:  CBC:   WBC   Date Value Ref Range Status   04/10/2017 8.51 3.90 - 12.70 K/uL Final     Hemoglobin   Date Value Ref Range Status   04/10/2017 11.4 (L) 12.0 - 16.0 g/dL Final     POC Hematocrit   Date Value Ref Range Status   04/05/2017 20 (LL) 36 - 54 %PCV Final     Hematocrit   Date Value Ref  Range Status   04/10/2017 34.3 (L) 37.0 - 48.5 % Final     MCV   Date Value Ref Range Status   04/10/2017 82 82 - 98 fL Final     Platelets   Date Value Ref Range Status   04/10/2017 222 150 - 350 K/uL Final       BMP:   Recent Labs  Lab 04/10/17  0600   *   *   K 3.5   CL 95   CO2 29   BUN 11   CREATININE 0.5   CALCIUM 8.3*   MG 1.7       LFT: Lab Results   Component Value Date    AST 24 04/10/2017    ALKPHOS 97 04/10/2017    BILITOT 2.0 (H) 04/10/2017       Albumin:   Albumin   Date Value Ref Range Status   04/10/2017 1.7 (L) 3.5 - 5.2 g/dL Final     Protein:   Total Protein   Date Value Ref Range Status   04/10/2017 5.8 (L) 6.0 - 8.4 g/dL Final       LACTIC ACID:   Lab Results   Component Value Date    LACTATE 0.9 03/31/2017       Legal/Advanced Directives:  Living Will:   Resuscitate Status: Full Code  Decision-Making Capacity:   Medical Power of :     Psychosocial/Cultural:     Spiritual:     F- Shelly and Belief    I - Importance  .  C - Community    A - Address in Care      Problem list:  Active Hospital Problems    Diagnosis  POA    *Small bowel obstruction [K56.69]  Yes    Acute respiratory failure with hypoxia [J96.01]  Yes    Protein-calorie malnutrition, severe [E43]  Yes    Metastatic renal cell carcinoma [C64.9]  Yes    Bone metastases [C79.51]  Yes    Retroperitoneal lymphadenopathy [R59.0]  Yes    Lung metastases [C78.00]  Yes      Resolved Hospital Problems    Diagnosis Date Resolved POA   No resolved problems to display.

## 2017-04-10 NOTE — PROGRESS NOTES
[facility]  Adult Nutrition  Progress Note    SUMMARY     Recommendations  Recommendation/Intervention:   1. Continue current TPN regimen - meeting EEN and EPN.   2. As medically appropriate, advance PO diet as tolerated.   RD to monitor.    Goals: Patient to receive nutrition within 48 hours  Nutrition Goal Status: goal met  Communication of RD Recs: discussed on rounds    Reason for Assessment  Reason for Assessment: RD follow-up  Diagnosis:  (SBO)  Relevent Medical History: metastatic renal cell carcinoma s/p radical L nephrectomy 1/30/17   Interdisciplinary Rounds: attended   General Information Comments: POD#5 s/p ex lap and G-tube placement. Patient still NPO and on TPN. LBM 4/1. Nutriiton D/C Planning: unable to determine at this time.    Nutrition Prescription Ordered  Current Diet Order: NPO  Current Nutrition Support Formula Ordered: Clinimax E 5/20 (+ lipids)  Current Nutrition Support Rate Ordered: 40 (ml)  Current Nutrition Support Frequency Ordered: mL/hr     Evaluation of Received Nutrients/Fluid Intake  Parenteral Calories (kcal): 1345  Parenteral Protein (gm): 48  Parenteral Fluid (mL): 1210  Energy Calories Required: meeting needs  % Kcal Needs: 102  Protein Required: meeting needs  % Protein Needs: 107   Fluid Required: meeting needs  Comments: GIR = 3.3 mg/kg/min      Nutrition Risk Screen   Nutrition Risk Screen: reduced oral intake over the last month    Nutrition/Diet History  Patient Reported Diet/Restrictions/Preferences: general  Typical Food/Fluid Intake: Patient unable to hold down food or liquids PTA per MD note.  Food Preferences: No cultural or [employer] needs identified at this time.  Factors Affecting Nutritional Intake: NPO, abdominal pain, nausea/vomiting     Labs/Tests/Procedures/Meds   Pertinent Labs Reviewed: reviewed  Pertinent Labs Comments: Na 134, Glu 112, Ca 8.3, Phos 2.3, Alb 1.7, T. bili 2.0  Pertinent Medications Reviewed: reviewed  Pertinent  Medications Comments: pantoprazole    Physical Findings  Overall Physical Appearance: underweight, on oxygen therapy  Tubes: gastrostomy tube  Oral/Mouth Cavity: WDL  Skin:  (incision)    Anthropometrics   Height (inches): 64.02 in  Weight Method: Stated  Weight (kg): 40.8 kg   Ideal Body Weight (IBW), Female: 120.1 lb   % Ideal Body Weight, Female (lb): 74.9 lb  BMI (kg/m2): 15.43  BMI Grade: less than 16 protein-energy malnutrition grade III     Estimated/Assessed Needs  Weight Used For Calorie Calculations: 40.8 kg (89 lb 15.2 oz)   Height (cm): 162.6 cm   Energy Need Method: [place]-St Jeor (1322 kcal/day (1.3))   RMR ([place]-St. Jeor Equation): 1016.84   Weight Used For Protein Calculations: 40.8 kg (89 lb 15.2 oz)  Protein Requirements: 45-53 g/day (1.1-1.3 g/kg)  Fluid Need Method: RDA Method (1 mL/kcal or per MD)     Monitor and Evaluation  Food and Nutrient Intake: energy intake, parenteral nutrition intake  Food and Nutrient Adminstration: diet order, enteral and parenteral nutrition administration   Physical Activity and Function: nutrition-related ADLs and IADLs  Anthropometric Measurements: weight, weight change  Biochemical Data, Medical Tests and Procedures: electrolyte and renal panel, gastrointestinal profile, glucose/endocrine profile, lipid profile  Nutrition-Focused Physical Findings: overall appearance    Nutrition Risk  Level of Risk:  (1x/week)    Nutrition Follow-Up  RD Follow-up?: Yes    Nutrition Diagnosis  Problem: Inadequate energy intake  Etiology: decreased ability to consume sufficient energy  Signs/Symptoms: NPO with no alternate means of nutrition  Nutrition Diagnosis Status: Resolved

## 2017-04-10 NOTE — PROGRESS NOTES
Progress Note  General Surgery    Admit Date: 3/31/2017  Post-operative Day: 5 Days Post-Op  Hospital Day: 11    SUBJECTIVE:     Follow-up For:  Procedure(s) (LRB):  LAPAROTOMY-EXPLORATORY (N/A)  GASTROSTOMY (N/A)    Pain controlled. g-tube in place draining. No BM/flatus. On TPN. Trouble sleeping past few days.     Scheduled Meds:   enoxaparin  40 mg Subcutaneous Q24H    fat emulsion 20%  250 mL Intravenous Daily    fentaNYL  1 patch Transdermal Q72H    pantoprazole  40 mg Intravenous Daily    sodium chloride 0.9%  10 mL Intravenous Q6H    sodium chloride 0.9%  3 mL Intravenous Q8H     Continuous Infusions:   Amino acid 5% - dextrose 20% (CLINIMIX-E) solution with additives (1L provides 50 gm AA, 200 gm CHO (680 kcal/L dextrose), Na 35, K 30, Mg 5, Ca 4.5, Acetate 80, Cl 39, Phos 15) 40 mL/hr at 04/10/17 0306    hydromorphone in 0.9 % NaCl 6 mg/30 ml       PRN Meds:acetaminophen, naloxone, ondansetron, promethazine (PHENERGAN) IVPB, Flushing PICC Protocol **AND** sodium chloride 0.9% **AND** sodium chloride 0.9%    Review of patient's allergies indicates:   Allergen Reactions    No known drug allergies        Review of Systems  See prior notes for additional details not listed in HPI      OBJECTIVE:     Vital Signs (Most Recent)  Temp: 98.8 °F (37.1 °C) (04/10/17 0021)  Pulse: (!) 114 (04/10/17 0300)  Resp: 18 (04/10/17 0021)  BP: 133/84 (04/10/17 0021)  SpO2: (!) 92 % (04/10/17 0021)    Vital Signs Range (Last 24H):  Temp:  [98.1 °F (36.7 °C)-99.6 °F (37.6 °C)]   Pulse:  [114-129]   Resp:  [18]   BP: (128-148)/(81-89)   SpO2:  [92 %-98 %]     I & O (Last 24H):    Intake/Output Summary (Last 24 hours) at 04/10/17 0740  Last data filed at 04/09/17 1721   Gross per 24 hour   Intake              120 ml   Output              325 ml   Net             -205 ml       Physical Exam:  General: NAD,   Neuro: aaox4, no obvious focal deficit   Respiratory: resps even unlabored  Cardiac: cap refill <2 sec, RRR  Abdomen:  scaphoid abdomen, milo tender, midline incision. Incision c/d/i. Decreased BS.  G tube to gravity.   Extremities: Warm dry and intact      Lab Results   Component Value Date    WBC 7.12 04/08/2017    HGB 12.2 04/08/2017    HCT 36.7 (L) 04/08/2017    MCV 81 (L) 04/08/2017     04/08/2017     Lab Results   Component Value Date    CREATININE 0.5 04/10/2017    BUN 11 04/10/2017     (L) 04/10/2017    K 3.5 04/10/2017    CL 95 04/10/2017    CO2 29 04/10/2017    CALCIUM 8.3 (L) 04/10/2017    PHOS 2.3 (L) 04/10/2017    MG 1.7 04/10/2017         Diagnostic Results:  Labs: Reviewed    ASSESSMENT/PLAN:     Nikia Lamas is a 51 y.o. female with metastatic renal cell carcinoma s/p left radical nephrectomy currently on cabozantinib, presenting with pSBO POD5 ex lap, omental caking, ascites concerning for carcinomatosis and G tube placement    - G tube to gravity, flush G tube - please flush G tube with 10 cc every 6 hours with saline.   - Ambulate  - NPO, ice chips for comfort   - TPN  - dilaudid PCA. Tylenol suppository PRN   - protonix daily  - Home amlodipine  - OOBTC/Ambulate  - Strict I & O's  - DVT prophylaxis  - replace potassium, magnesium and phosphorus   - cont care per heme/onc    Alonso Lima, PGY-2  General Surgery  [phone]

## 2017-04-10 NOTE — PROGRESS NOTES
Palliative Care Progress Note  4/7/17    S:  Pain improved overnight since placing fentanyl patch.  Patient reports not having to use pca doses.  Still has not passed a BM.  No n/v.  Taking in ice chips and clear liquids.  GTube draining - attempted clamp, pt got nauseated.    Dr. Connelly, pt's oncologist, and I were able to have a discussion with the patient about long term goals.  Patient unclear about length of TPN - thought it would be permanent.  She is also unclear about why she is not having a BM and attributed this to her not eating.    Explained omental caking to patient and her parents who are at the bedside.  Explained this as reason for obstructions which are limiting bowel motility and thus formation of bm's.  Explained TPN as time-limited trial for 2-4 weeks to see how patient does at home and if Gtube drainage able to slow down and patient able to eat, then this would put her more in favor of receiving more cancer directed therapy, namely immunotherapy.  Patient and family understanding - they were shocked at news of omental caking, stated this was 1st time they had heard this.  They requested or reports that showed this.    OME:  200 (from Fent patch) + approx 200 (from Dilaudid PCA)    PPS: 40  ROS:  Pain at a 6/10, no nausea currently.  +Anxiety.  Slept okay.  No BM.  No cp/sob.  Abd feels tight.  +Appetite for liquids.  +Fatigue    O:  Vitals:  Reviewed  Gen:  Awake, alert, no acute distress, appears tired  HEENT  NC/AT, MMM, No scleral icterus, pale conjunctiva  CVS:  RRR, S1/S2+, no murmurs  Lungs:  CTAB, no w/c, NML inspiratory phase  Abd:  Distended, no BS appreciated, taut, +tenderness, erythema over surgical site  MSK:  No edema, Moves extremities x 4  Neuro:  No focal deficits  Psych:  Mood depressed, anxious at times, does appear tired today    A/P:  51 yr old woman with metastatic renal cell ca to the lungs, now with peritoneal carcinomatosis, on TPN.  Pal med consult for goals of care  and symptom management.    1.  Pain:  Nociceptive somatic + visceral due to underlying cancer  -Cont fentanyl patch 100mcg q72  -Cont PCA at Dilaudid 0.5mg q30 min prn - will attempt to wean this to q1 hour tomorrow (Sat 4/8) once po Oxycodone (liquid) started (15mg q3 prn)  -Following day (Sun, 4/9) PCA should be d/c'd and patient placed on backup IV Dilaudid 0.5mg q2 prn    2.  Constipation:  Bowel obstruction, recs per surgical team, decompressive PeG in place, draining    3.  Goals of Care:  Long conversation per above.  Plan for time limited trial (2-4 weeks max) with TPN at home to see if patient a candidate for further therapies.  It has been explained the low likelihood of this.  Patient and family appear to be in understanding.  Family states patient is not ready to make a decision to stop TPN and to not pursue further immunotherapy.  -Will address code status in upcoming days  -Asked primary team to have pt sign medical release of info as she wants to see OR report.    Thank you for allowing me to participate in Ms. Lamas's care.  Recs conveyed to primary team fellow and intern.    Please call with ?'s,    Lia Bautista MD, MS, FAAP  Internal Medicine, Pediatrics, Hospice & Palliative Medicine  Medical Director, Palliative Medicine  [phone]    Time spent:  65 minutes on goals of care and pain management  >50% of time spent in counseling/coordination of care

## 2017-04-10 NOTE — ASSESSMENT & PLAN NOTE
- adhesions and ascites seen during Ex Lap with thickening of peritoneum and omental caking.   - POD 5, still without flatus/BM   - continue NPO except water and ice chips  - biopsy sent, likely metastatic renal cell carcinoma  - venting gastrostomy tube has been placed, to gravity  - continue pain control  - TPN

## 2017-04-11 LAB
ALBUMIN SERPL BCP-MCNC: 1.6 G/DL
ALBUMIN SERPL BCP-MCNC: NORMAL G/DL
ALP SERPL-CCNC: 110 U/L
ALP SERPL-CCNC: NORMAL U/L
ALT SERPL W/O P-5'-P-CCNC: 16 U/L
ALT SERPL W/O P-5'-P-CCNC: NORMAL U/L
ANION GAP SERPL CALC-SCNC: 10 MMOL/L
ANION GAP SERPL CALC-SCNC: NORMAL MMOL/L
AST SERPL-CCNC: 16 U/L
AST SERPL-CCNC: NORMAL U/L
BILIRUB SERPL-MCNC: 2.6 MG/DL
BILIRUB SERPL-MCNC: NORMAL MG/DL
BUN SERPL-MCNC: 15 MG/DL
BUN SERPL-MCNC: NORMAL MG/DL
CALCIUM SERPL-MCNC: 8.5 MG/DL
CALCIUM SERPL-MCNC: NORMAL MG/DL
CHLORIDE SERPL-SCNC: 97 MMOL/L
CHLORIDE SERPL-SCNC: NORMAL MMOL/L
CO2 SERPL-SCNC: 29 MMOL/L
CO2 SERPL-SCNC: NORMAL MMOL/L
CREAT SERPL-MCNC: 0.6 MG/DL
CREAT SERPL-MCNC: NORMAL MG/DL
EST. GFR  (AFRICAN AMERICAN): >60 ML/MIN/1.73 M^2
EST. GFR  (AFRICAN AMERICAN): NORMAL ML/MIN/1.73 M^2
EST. GFR  (NON AFRICAN AMERICAN): >60 ML/MIN/1.73 M^2
EST. GFR  (NON AFRICAN AMERICAN): NORMAL ML/MIN/1.73 M^2
GLUCOSE SERPL-MCNC: 102 MG/DL
GLUCOSE SERPL-MCNC: NORMAL MG/DL
MAGNESIUM SERPL-MCNC: 1.7 MG/DL
PHOSPHATE SERPL-MCNC: 4.8 MG/DL
POTASSIUM SERPL-SCNC: 3.8 MMOL/L
POTASSIUM SERPL-SCNC: NORMAL MMOL/L
PROT SERPL-MCNC: 5.9 G/DL
PROT SERPL-MCNC: NORMAL G/DL
SODIUM SERPL-SCNC: 136 MMOL/L
SODIUM SERPL-SCNC: NORMAL MMOL/L

## 2017-04-11 PROCEDURE — 11000001 HC ACUTE MED/SURG PRIVATE ROOM

## 2017-04-11 PROCEDURE — 25000003 PHARM REV CODE 250: Performed by: HOSPITALIST

## 2017-04-11 PROCEDURE — 25000003 PHARM REV CODE 250: Performed by: STUDENT IN AN ORGANIZED HEALTH CARE EDUCATION/TRAINING PROGRAM

## 2017-04-11 PROCEDURE — 99233 SBSQ HOSP IP/OBS HIGH 50: CPT | Mod: ,,, | Performed by: INTERNAL MEDICINE

## 2017-04-11 PROCEDURE — 63600175 PHARM REV CODE 636 W HCPCS: Performed by: PHYSICIAN ASSISTANT

## 2017-04-11 PROCEDURE — 80053 COMPREHEN METABOLIC PANEL: CPT

## 2017-04-11 PROCEDURE — 63600175 PHARM REV CODE 636 W HCPCS: Performed by: STUDENT IN AN ORGANIZED HEALTH CARE EDUCATION/TRAINING PROGRAM

## 2017-04-11 PROCEDURE — 94761 N-INVAS EAR/PLS OXIMETRY MLT: CPT

## 2017-04-11 PROCEDURE — 84100 ASSAY OF PHOSPHORUS: CPT

## 2017-04-11 PROCEDURE — 97530 THERAPEUTIC ACTIVITIES: CPT

## 2017-04-11 PROCEDURE — 83735 ASSAY OF MAGNESIUM: CPT

## 2017-04-11 PROCEDURE — 63600175 PHARM REV CODE 636 W HCPCS: Performed by: INTERNAL MEDICINE

## 2017-04-11 PROCEDURE — C9113 INJ PANTOPRAZOLE SODIUM, VIA: HCPCS | Performed by: PHYSICIAN ASSISTANT

## 2017-04-11 RX ORDER — PROMETHAZINE HYDROCHLORIDE 25 MG/1
25 SUPPOSITORY RECTAL EVERY 6 HOURS PRN
Status: DISCONTINUED | OUTPATIENT
Start: 2017-04-11 | End: 2017-04-19 | Stop reason: HOSPADM

## 2017-04-11 RX ORDER — FENTANYL 100 UG/H
2 PATCH TRANSDERMAL
Status: DISCONTINUED | OUTPATIENT
Start: 2017-04-11 | End: 2017-04-18

## 2017-04-11 RX ADMIN — Medication: at 12:04

## 2017-04-11 RX ADMIN — RETINOL, ERGOCALCIFEROL, .ALPHA.-TOCOPHEROL ACETATE, DL-, PHYTONADIONE, ASCORBIC ACID, NIACINAMIDE, RIBOFLAVIN 5-PHOSPHATE SODIUM, THIAMINE HYDROCHLORIDE, PYRIDOXINE HYDROCHLORIDE, DEXPANTHENOL, BIOTIN, FOLIC ACID, AND CYANOCOBALAMIN: KIT at 02:04

## 2017-04-11 RX ADMIN — FENTANYL 2 PATCH: 100 PATCH, EXTENDED RELEASE TRANSDERMAL at 01:04

## 2017-04-11 RX ADMIN — Medication: at 01:04

## 2017-04-11 RX ADMIN — Medication 3 ML: at 02:04

## 2017-04-11 RX ADMIN — ASCORBIC ACID, VITAMIN A PALMITATE, CHOLECALCIFEROL, THIAMINE HYDROCHLORIDE, RIBOFLAVIN-5 PHOSPHATE SODIUM, PYRIDOXINE HYDROCHLORIDE, NIACINAMIDE, DEXPANTHENOL, ALPHA-TOCOPHEROL ACETATE, VITAMIN K1, FOLIC ACID, BIOTIN, CYANOCOBALAMIN: 200; 3300; 200; 6; 3.6; 6; 40; 15; 10; 150; 600; 60; 5 INJECTION, SOLUTION INTRAVENOUS at 03:04

## 2017-04-11 RX ADMIN — I.V. FAT EMULSION 250 ML: 20 EMULSION INTRAVENOUS at 01:04

## 2017-04-11 RX ADMIN — ONDANSETRON 8 MG: 2 INJECTION INTRAMUSCULAR; INTRAVENOUS at 03:04

## 2017-04-11 RX ADMIN — ENOXAPARIN SODIUM 40 MG: 100 INJECTION SUBCUTANEOUS at 05:04

## 2017-04-11 RX ADMIN — Medication: at 07:04

## 2017-04-11 RX ADMIN — PANTOPRAZOLE SODIUM 40 MG: 40 INJECTION, POWDER, FOR SOLUTION INTRAVENOUS at 08:04

## 2017-04-11 RX ADMIN — ONDANSETRON 8 MG: 2 INJECTION INTRAMUSCULAR; INTRAVENOUS at 08:04

## 2017-04-11 RX ADMIN — ONDANSETRON 8 MG: 2 INJECTION INTRAMUSCULAR; INTRAVENOUS at 12:04

## 2017-04-11 RX ADMIN — Medication: at 09:04

## 2017-04-11 NOTE — PROGRESS NOTES
Progress Note  General Surgery    Admit Date: 3/31/2017  Post-operative Day: 6 Days Post-Op  Hospital Day: 12    SUBJECTIVE:     Follow-up For:  Procedure(s) (LRB):  LAPAROTOMY-EXPLORATORY (N/A)  GASTROSTOMY (N/A)    Pain controlled. g-tube in place draining. No BM/flatus. On TPN. No issues overnight. Some tachycardia. On 3-4L NC.     Scheduled Meds:   enoxaparin  40 mg Subcutaneous Q24H    fat emulsion 20%  250 mL Intravenous Daily    fentaNYL  1 patch Transdermal Q72H    ondansetron  8 mg Intravenous Q8H    pantoprazole  40 mg Intravenous Daily    sodium chloride 0.9%  10 mL Intravenous Q6H    sodium chloride 0.9%  3 mL Intravenous Q8H     Continuous Infusions:   Amino acid 5% - dextrose 20% (CLINIMIX-E) solution with additives (1L provides 50 gm AA, 200 gm CHO (680 kcal/L dextrose), Na 35, K 30, Mg 5, Ca 4.5, Acetate 80, Cl 39, Phos 15) 40 mL/hr at 04/11/17 0344    hydromorphone in 0.9 % NaCl 6 mg/30 ml       PRN Meds:naloxone, promethazine (PHENERGAN) IVPB, Flushing PICC Protocol **AND** sodium chloride 0.9% **AND** sodium chloride 0.9%    Review of patient's allergies indicates:   Allergen Reactions    No known drug allergies        Review of Systems  See prior notes for additional details not listed in HPI      OBJECTIVE:     Vital Signs (Most Recent)  Temp: 98.7 °F (37.1 °C) (04/11/17 0442)  Pulse: (!) 119 (04/11/17 0442)  Resp: 17 (04/11/17 0442)  BP: 114/76 (04/11/17 0442)  SpO2: (!) 92 % (04/11/17 0442)    Vital Signs Range (Last 24H):  Temp:  [97.9 °F (36.6 °C)-99.5 °F (37.5 °C)]   Pulse:  [116-129]   Resp:  [16-18]   BP: (110-133)/(74-85)   SpO2:  [91 %-94 %]     I & O (Last 24H):    Intake/Output Summary (Last 24 hours) at 04/11/17 0704  Last data filed at 04/11/17 0400   Gross per 24 hour   Intake             1585 ml   Output              825 ml   Net              760 ml       Physical Exam:  General: NAD,   Neuro: aaox4, no obvious focal deficit   Respiratory: resps even unlabored  Cardiac:  cap refill <2 sec, RRR  Abdomen: scaphoid abdomen, milo tender, midline incision. Incision c/d/i. Decreased BS.  G tube to gravity with thin bilious contents  Extremities: Warm dry and intact      Lab Results   Component Value Date    WBC 8.51 04/10/2017    HGB 11.4 (L) 04/10/2017    HCT 34.3 (L) 04/10/2017    MCV 82 04/10/2017     04/10/2017     Lab Results   Component Value Date    CREATININE 0.5 04/10/2017    BUN 11 04/10/2017     (L) 04/10/2017    K 4.3 04/11/2017    CL 95 04/10/2017    CO2 29 04/10/2017    CALCIUM 8.3 (L) 04/10/2017    PHOS 2.3 (L) 04/10/2017    MG 1.7 04/10/2017         Diagnostic Results:  Labs: Reviewed    ASSESSMENT/PLAN:     Nikia Lamas is a 51 y.o. female with metastatic renal cell carcinoma s/p left radical nephrectomy currently on cabozantinib, presenting with pSBO POD6 ex lap, omental caking, ascites concerning for carcinomatosis and G tube placement    - continue G tube to gravity, flush G tube - please flush G tube with 10 cc every 6 hours with saline.   - Ambulate  - NPO, ice chips for comfort   - continue TPN  - pain management per primary   - protonix daily  - Home amlodipine  - OOBTC/Ambulate  - Strict I & O's  - DVT prophylaxis  - replace lytes PRN   - will sign off. Please call with any questions.     Alonso Lima, PGY-2  General Surgery  [phone]

## 2017-04-11 NOTE — PROGRESS NOTES
Palliative Care Daily Progress Note     A/P: 51 yr old woman with metastatic renal cell ca to the lungs, now with peritoneal carcinomatosis, on TPN, admitted for terminal/malignant SBO.  Pal med following for goals of care and symptom management.      Plan/Recommendations:      1. Pain: Nociceptive somatic + visceral due to underlying cancer, escalating needs though pt very comfortable on my assessment, reports improvement in pain control and ability to function/move around room  -significant increase in opiate use in past 24 hours - dilaudid 24 mg IV via PCA  -dilaudid 24 mg = fentanyl 125 mcg/h so will change fentanyl patch from 100 to 200 mcg patch and escalate dilaudid PCA to 0.6 mg bolus, q10 min  -given that patient's g-tube unable to be clamped and is currently draining to gravity, patient would not benefit from oral medications for breakthrough pain. As such, recommend continuing PCA both now and at discharge (patient has PICC for TPN)   -I worry that with pt's current sx burden and escalating sx needs, home may be an unrealistic dispo option  -if pt unable to have home PCA, makes this even less realistic  -If patient's code status changes and goal of care becomes more comfort-focused, would recommend starting 6 mg IV decadron daily, to help inflammation/edema in patients with terminal bowel obstruction. However, given that patient is currently on TPN would not recommend this at this time currently as she is already high risk for infection secondary to such.     2. Constipation: Bowel obstruction, recs per surgical team, decompressive PEG in place, draining     3. Nausea: Recommend scheduled Zofran 8 mg IV Q8 and PRN phenergan suppositories      4. Goals of Care: current plan for time limited trial (2-4 weeks max) with TPN at home to see if patient a candidate for further therapies though suspect this is very unlikely.  Inpatient hospice setting would be ideal place for pt to receive the kind of intensive sx  "management that she will need moving forward.  On my visit, pt actively doing estate planning, getting her affairs in order.  May be worth revisiting hospice care, particularly if current insurance unable to accommodate home with both TPN and dilaudid PCA pump  -ideally, code status should be addressed prior to d/c.  Palliative care able to discuss if the primary team desires this    /73 (BP Location: Left arm, Patient Position: Lying, BP Method: Automatic)  Pulse (!) 113  Temp 97.9 °F (36.6 °C) (Oral)   Resp 12  Ht 5' 4" (1.626 m)  Wt 40.8 kg (90 lb)  LMP 01/03/2017  SpO2 98%  Breastfeeding? No  BMI 15.45 kg/m2  Gen: comfortable, sitting in chair writing  HEENT: MMM  CV/Resp: comfortable resp  Abd: midline abd incision  Venting G tube; + BS  Ext no edema  Neuro aaox3, interactive and appropriate      SUBJECTIVE:      Interval History: NAEON, but still has not passed BM or flatus. Says her night was "ok" but pain control is still an issue, particularly right side. Counts down minutes between PCA doses of medication. G tube unable to be clamped over weekend (nausea) so patient remains on PCA.      HPI: 52 yo F with PMHx renal cell carcinoma with mets to liver and bone s/p L nephrectomy 01/2017 presents with 8 d constipation with associated nausea/vomiting intermittently for past 7 days. She notes difficulty keeping down any food originally but had been using some Ensure supplements. Had been doing daily PEG 17 g but decided to try a magnesium citrate due to claims to help with abdominal cramping. She had no relief with this. Tried lactulose with increase in abd pain and cramping and a couple limited watery bowel movements after using this. Notes approx 4 lb weight loss over past week or so. She has been on cabozantinib 60 mg po qd since 01/25/17 with no issues with bowel obstruction since starting chemo. Has abdominal procedures in [facility] including recent L nephrectomy, 2 C sections, and tubal ligation. "      Nneka Perez MD  Department of Emergency Medicine  Department of Palliative Medicine  [phone]

## 2017-04-11 NOTE — SUBJECTIVE & OBJECTIVE
Interval history:  NAEON. Still no BM/flatus.   Increased PCA dose to 0.4mg and lockout 10 minutes yesterday.   Plan for home by end of week with TPN and possible PCA per palliative care.       Oncology Treatment Plan:   [No treatment plan]    Medications:  Continuous Infusions:   Amino acid 5% - dextrose 20% (CLINIMIX-E) solution with additives (1L provides 50 gm AA, 200 gm CHO (680 kcal/L dextrose), Na 35, K 30, Mg 5, Ca 4.5, Acetate 80, Cl 39, Phos 15) 40 mL/hr at 17 0344    hydromorphone in 0.9 % NaCl 6 mg/30 ml       Scheduled Meds:   enoxaparin  40 mg Subcutaneous Q24H    fat emulsion 20%  250 mL Intravenous Daily    fentaNYL  1 patch Transdermal Q72H    ondansetron  8 mg Intravenous Q8H    pantoprazole  40 mg Intravenous Daily    sodium chloride 0.9%  10 mL Intravenous Q6H    sodium chloride 0.9%  3 mL Intravenous Q8H     PRN Meds:naloxone, promethazine (PHENERGAN) IVPB, Flushing PICC Protocol **AND** sodium chloride 0.9% **AND** sodium chloride 0.9%     Review of patient's allergies indicates:   Allergen Reactions    No known drug allergies         Past Medical History:   Diagnosis Date    Abnormal Pap smear of cervix 10/2015    + HR HPV    Anemia     hx chronic anemia, improved after ablation    Herpes simplex without mention of complication      Past Surgical History:   Procedure Laterality Date     SECTION  ,     DILATION AND CURETTAGE OF UTERUS      ENDOMETRIAL ABLATION  2014    Novasure endometrial ablation with D&C    laparoscopy for endometriosis      TUBAL LIGATION      Laparoscopic     Family History     Problem Relation (Age of Onset)    Alzheimer's disease Maternal Grandmother    Hypertension Mother        Social History Main Topics    Smoking status: Never Smoker    Smokeless tobacco: Never Used    Alcohol use 0.6 oz/week     1 Glasses of wine per week      Comment: social    Drug use: No    Sexual activity: Not Currently     Partners:  Male     Birth control/ protection: Surgical, None      Comment: No partner x 1 year       Review of Systems   Constitutional: Negative for chills and fever.   HENT: Negative for congestion.    Respiratory: Negative for cough and shortness of breath.    Cardiovascular: Negative for chest pain, palpitations and leg swelling.   Gastrointestinal: Positive for abdominal pain, constipation and nausea. Negative for abdominal distention and blood in stool.   Genitourinary: Negative for dysuria and hematuria.   Skin: Negative for rash and wound.     Objective:     Vital Signs (Most Recent):  Temp: 97.9 °F (36.6 °C) (04/11/17 0800)  Pulse: (!) 113 (04/11/17 0800)  Resp: 12 (04/11/17 0800)  BP: 111/73 (04/11/17 0800)  SpO2: (!) 92 % (04/11/17 0800) Vital Signs (24h Range):  Temp:  [97.9 °F (36.6 °C)-99.5 °F (37.5 °C)] 97.9 °F (36.6 °C)  Pulse:  [113-129] 113  Resp:  [12-18] 12  SpO2:  [92 %-94 %] 92 %  BP: (110-133)/(73-85) 111/73     Weight: 40.8 kg (90 lb)  Body mass index is 15.45 kg/(m^2).  Body surface area is 1.36 meters squared.      Intake/Output Summary (Last 24 hours) at 04/11/17 0909  Last data filed at 04/11/17 0400   Gross per 24 hour   Intake             1585 ml   Output              825 ml   Net              760 ml       Physical Exam   Constitutional: She is oriented to person, place, and time. She appears well-developed.   Fatigued, malnourished   HENT:   Head: Normocephalic.   Eyes: Pupils are equal, round, and reactive to light.   Neck: Neck supple. No JVD present.   Cardiovascular: Normal rate, regular rhythm and normal heart sounds.    No murmur heard.  Pulmonary/Chest: Effort normal. No respiratory distress. She has decreased breath sounds. She has no wheezes. She has no rhonchi.   Abdominal:   middle vertical abdominal incision with staples. Venting gastrostomy tube noted in left abdominal quadrant. Hypoactive bowel sounds.   Musculoskeletal: She exhibits no edema.   Neurological: She is oriented to  person, place, and time.   Skin: Skin is warm.   Psychiatric: She has a normal mood and affect.     Significant Labs:   Labs have been reviewed.    Recent Labs  Lab 04/11/17  0600   CALCIUM SEE COMMENT   PROT SEE COMMENT   NA SEE COMMENT   K SEE COMMENT   CO2 SEE COMMENT   CL SEE COMMENT   BUN SEE COMMENT   CREATININE SEE COMMENT   ALKPHOS SEE COMMENT   ALT SEE COMMENT   AST SEE COMMENT   BILITOT SEE COMMENT     Diagnostic Results:  I have reviewed all pertinent imaging results/findings within the past 24 hours.

## 2017-04-11 NOTE — PROGRESS NOTES
"[facility]  Hematology/Oncology  Progress Note    Patient Name: Nikia Lamas  Admission Date: 3/31/2017  Hospital Length of Stay: 11 days  Code Status: Full Code     Subjective:     HPI:  52 yo F with PMHx renal cell carcinoma with mets to liver and bone s/p L nephrectomy 01/2017 presents with 8 d constipation with associated nausea/vomiting intermittently for past 7 days.  She notes difficulty keeping down any food originally but had been using some Ensure supplements.  Had been doing daily PEG 17 g but decided to try a magnesium citrate due to claims to help with abdominal cramping.  She had no relief with this.  Tried lactulose with increase in abd pain and cramping and a couple limited watery bowel movements after using this.  Notes approx 4 lb weight loss over past week or so.  She has been on cabozantinib 60 mg po qd since 01/25/17 with no issues with bowel obstruction since starting chemo.  Has abdominal procedures in [facility] including recent L nephrectomy, 2 C sections, and tubal ligation.       Oncologic History (per Dr. Connelly's note dated 3/24/17):  Nikia Lamas is a 51 y.o. White female, referred by Dr. German, for management of metastatic renal cell carcinoma. Pt reports left mass was found incidentally while being worked up for a persistent dry cough that began in October 2016. She was evaluated with a CXR and a non-contrast chest CT.   12/14/16- CT of the chest without contrast showed multiple pulmonary nodes and a large left renal mass present. The L kidney was not fully evaluated as only the superior aspect was in the CT, but the mass was ~ 11 cm in diameter.   12/19/16- CT CAP reveals "Large heterogeneous left renal mass, consistent with renal cell carcinoma.  This mass does not appear to invade the renal vein or IVC.  Innumerable pulmonary nodules, retroperitoneal lymphadenopathy and osseous lytic lesions, concerning for metastatic disease. Indeterminate hepatic " "lesions, possibly metastatic disease."  1/3/17- Radical left nephrectomy      Interval history:  NAEON. Still no BM/flatus.   Increased PCA dose to 0.4mg and lockout 10 minutes yesterday.   Plan for home by end of week with TPN and possible PCA per palliative care.       Oncology Treatment Plan:   [No treatment plan]    Medications:  Continuous Infusions:   Amino acid 5% - dextrose 20% (CLINIMIX-E) solution with additives (1L provides 50 gm AA, 200 gm CHO (680 kcal/L dextrose), Na 35, K 30, Mg 5, Ca 4.5, Acetate 80, Cl 39, Phos 15) 40 mL/hr at 17 0344    hydromorphone in 0.9 % NaCl 6 mg/30 ml       Scheduled Meds:   enoxaparin  40 mg Subcutaneous Q24H    fat emulsion 20%  250 mL Intravenous Daily    fentaNYL  1 patch Transdermal Q72H    ondansetron  8 mg Intravenous Q8H    pantoprazole  40 mg Intravenous Daily    sodium chloride 0.9%  10 mL Intravenous Q6H    sodium chloride 0.9%  3 mL Intravenous Q8H     PRN Meds:naloxone, promethazine (PHENERGAN) IVPB, Flushing PICC Protocol **AND** sodium chloride 0.9% **AND** sodium chloride 0.9%     Review of patient's allergies indicates:   Allergen Reactions    No known drug allergies         Past Medical History:   Diagnosis Date    Abnormal Pap smear of cervix 10/2015    + HR HPV    Anemia     hx chronic anemia, improved after ablation    Herpes simplex without mention of complication      Past Surgical History:   Procedure Laterality Date     SECTION  ,     DILATION AND CURETTAGE OF UTERUS      ENDOMETRIAL ABLATION  2014    Novasure endometrial ablation with D&C    laparoscopy for endometriosis      TUBAL LIGATION  2006    Laparoscopic     Family History     Problem Relation (Age of Onset)    Alzheimer's disease Maternal Grandmother    Hypertension Mother        Social History Main Topics    Smoking status: Never Smoker    Smokeless tobacco: Never Used    Alcohol use 0.6 oz/week     1 Glasses of wine per week      " Comment: social    Drug use: No    Sexual activity: Not Currently     Partners: Male     Birth control/ protection: Surgical, None      Comment: No partner x 1 year       Review of Systems   Constitutional: Negative for chills and fever.   HENT: Negative for congestion.    Respiratory: Negative for cough and shortness of breath.    Cardiovascular: Negative for chest pain, palpitations and leg swelling.   Gastrointestinal: Positive for abdominal pain, constipation and nausea. Negative for abdominal distention and blood in stool.   Genitourinary: Negative for dysuria and hematuria.   Skin: Negative for rash and wound.     Objective:     Vital Signs (Most Recent):  Temp: 97.9 °F (36.6 °C) (04/11/17 0800)  Pulse: (!) 113 (04/11/17 0800)  Resp: 12 (04/11/17 0800)  BP: 111/73 (04/11/17 0800)  SpO2: (!) 92 % (04/11/17 0800) Vital Signs (24h Range):  Temp:  [97.9 °F (36.6 °C)-99.5 °F (37.5 °C)] 97.9 °F (36.6 °C)  Pulse:  [113-129] 113  Resp:  [12-18] 12  SpO2:  [92 %-94 %] 92 %  BP: (110-133)/(73-85) 111/73     Weight: 40.8 kg (90 lb)  Body mass index is 15.45 kg/(m^2).  Body surface area is 1.36 meters squared.      Intake/Output Summary (Last 24 hours) at 04/11/17 0909  Last data filed at 04/11/17 0400   Gross per 24 hour   Intake             1585 ml   Output              825 ml   Net              760 ml       Physical Exam   Constitutional: She is oriented to person, place, and time. She appears well-developed.   Fatigued, malnourished   HENT:   Head: Normocephalic.   Eyes: Pupils are equal, round, and reactive to light.   Neck: Neck supple. No JVD present.   Cardiovascular: Normal rate, regular rhythm and normal heart sounds.    No murmur heard.  Pulmonary/Chest: Effort normal. No respiratory distress. She has decreased breath sounds. She has no wheezes. She has no rhonchi.   Abdominal:   middle vertical abdominal incision with staples. Venting gastrostomy tube noted in left abdominal quadrant. Hypoactive bowel  sounds.   Musculoskeletal: She exhibits no edema.   Neurological: She is oriented to person, place, and time.   Skin: Skin is warm.   Psychiatric: She has a normal mood and affect.     Significant Labs:   Labs have been reviewed.    Recent Labs  Lab 04/11/17  0600   CALCIUM SEE COMMENT   PROT SEE COMMENT   NA SEE COMMENT   K SEE COMMENT   CO2 SEE COMMENT   CL SEE COMMENT   BUN SEE COMMENT   CREATININE SEE COMMENT   ALKPHOS SEE COMMENT   ALT SEE COMMENT   AST SEE COMMENT   BILITOT SEE COMMENT     Diagnostic Results:  I have reviewed all pertinent imaging results/findings within the past 24 hours.    Assessment/Plan:     * Small bowel obstruction  - adhesions and ascites seen during Ex Lap with thickening of peritoneum and omental caking.   - POD 5, still without flatus/BM   - continue NPO except water and ice chips  - biopsy sent, likely metastatic renal cell carcinoma  - venting gastrostomy tube has been placed, to gravity  - continue pain control per pall care  - TPN    Metastatic renal cell carcinoma  - will need to follow up with Dr. Connelly in clinic after discharge for further management of cancer   - Ex lap revealed omental caking, ascites concerning for carcinomatosis   - venting gastrostomy tube has been placed.  - have discussed potential for hospice if further chemo not done   - short term goal to get out of hospital and get stronger  - appreciate palliative care's assistance with this patient.    Bone metastases  - Continue pain control, palliative care helping with pain management    Lung metastases  - see above    Acute respiratory failure with hypoxia  - secondary to pulmonary metastases, volume overload/pulmonary edema, and inability to take deep inspirations due to abdominal pain  - continue supplemental oxygen, wean off for SpO2 goal of >90%   - requiring 2-4L NC  - incentive spirometry and PT/OT for atelectasis    Protein-calorie malnutrition, severe  - secondary to advanced cancer, bowel  obstruction, and peritoneal carcinomatosis with omental caking  - venting gastrostomy tube has been placed  - continue total parenteral nutrition per surgery  - electrolyte replacement while on TPN       Dispo: plan for discharge by Friday if possible, with TPN and PCA as well as home health PT/OT and skilled nurse    Tricia Charles MD  Hematology/Oncology  [facility]      ATTENDING NOTE, ONCOLOGY INPATIENT TEAM    As above; events of last 24 hours noted.  Patient seen and examined, chart reviewed.  Appears more comfortable, in NAD, however,. She required approximately 20 mg of IV hydromorphone over the last 24 hours..  Lungs are clear to auscultation.  Abdomen is soft, nontender.  Bowel sounds are present today but still sluggish.  Today's labs are pending    PLAN  Follow electrolytes daily.  Continue TPN.  Encourage ambulation.  Pain control with fentanyl plus the PCA pump.  DVT prophylaxis.  We hope we can discharge her home in the next 48-72 hours.  Her questions were answered to her satisfaction.      Cecilio Monsalve MD

## 2017-04-11 NOTE — PLAN OF CARE
Problem: Patient Care Overview  Goal: Plan of Care Review  Outcome: Ongoing (interventions implemented as appropriate)  Pt AA0x3 and VSS. Family at bedside. Two side rails up, bed locked, call light within reach. No falls noted as these precautions remain. Pt free of skin breakdown as the pt moves well independently. Pt on 4 LPM high flow NC with oxygen saturations above 92%. TPN infusing to right brachial PICC with PCA of dilaudid for pain. Pt ambulated in hallway with walker, IV pole, and O2. Pain controlled well with PRN meds. Hourly rounds made and no complaints at this time noted. Will resume with plan of care.

## 2017-04-11 NOTE — PLAN OF CARE
Problem: Patient Care Overview  Goal: Plan of Care Review  Outcome: Ongoing (interventions implemented as appropriate)  Pt is AAO x3.  VSS.  No injury/falls this shift.  Pt calls for assistance when needed and uses standby assist with mobility.  Pain being monitored and controlled via patient controlled administration meds.  No s/s of infection noted.  No s/s of skin breakdown noted.  Bed lowest position, call light within reach, siderails up x2.

## 2017-04-11 NOTE — ASSESSMENT & PLAN NOTE
- adhesions and ascites seen during Ex Lap with thickening of peritoneum and omental caking.   - POD 5, still without flatus/BM   - continue NPO except water and ice chips  - biopsy sent, likely metastatic renal cell carcinoma  - venting gastrostomy tube has been placed, to gravity  - continue pain control per pall care  - TPN

## 2017-04-11 NOTE — PLAN OF CARE
MDR's with Dr. Monsalve, patient on Fentanyl patch and PCA for pain, patient will d/c home with home health, pt and TPN via PICC when medically stable. CM will continue to follow for progression.

## 2017-04-11 NOTE — PLAN OF CARE
Problem: Physical Therapy Goal  Goal: Physical Therapy Goal  Goals to be met by: 2017     Patient will increase functional independence with mobility by performin. Supine to sit with Stand-by Assistance- MET  2. Sit to supine with Stand-by Assistance- MET  3. Sit to stand transfer with Modified [place]- MET  4. Bed to chair transfer with Modified [place] using no AD- MET  5. Gait x 300 feet with Stand-by Assistance using no assistive device.    Outcome: Ongoing (interventions implemented as appropriate)  Pt progressing. She has met all goals except gait goal.

## 2017-04-11 NOTE — PROGRESS NOTES
Spoke to the patient and her stepfather: He was assisting her with her disability arrangements through her work. She only has long term disability benefits (no short term) and she has to be disabled for 90 days before she will be eligible. Her stepfather is knowledgeable about her benefits. Will follow as needed for supportive counseling and discharge arrangements.

## 2017-04-11 NOTE — PT/OT/SLP PROGRESS
"Physical Therapy  Treatment    Nikia Lamas   MRN: [id number]   Admitting Diagnosis: Small bowel obstruction    PT Received On: 04/11/17  PT Start Time: 1613     PT Stop Time: 1629    PT Total Time (min): 16 min       Billable Minutes:  Therapeutic Activity 16    Treatment Type: Treatment  PT/PTA: PT             General Precautions: Standard, fall    Subjective:  I am worried about my back side.  Can you check it for me."    Objective:    Attempted to see patient 3x today.  On first visit in the morning patient reported recently returning from a walk and needed a break.  Therapist returned after lunch, but patient was ambulating in the [place] with nsg.  Therapist returned a 3rd time.  Patient supine in bed, reporting recent n/v.  She was still c/o nausea.  Patient requested therapist inspect skin on her back and sacrum.  Therapist performed skin assessment: small dressing placed over sacrum by nsg for comfort, no redness or breakdown visible.  Therapist educated patient on s/s of skin breakdown.  Patient performed bed mobility and sit to stand with modified independence, holding bed rail.  She wanted to attempt gait, but became nauseated again with standing.  Pt presenting with BLE edema today.  Therapist recommended to patient to elevate LEs to decrease swelling.  Patient performed bed to chair transfer with modified independence.  BLEs elevated.      Functional Mobility:  Bed Mobility:   Rolling/Turning to Left: Modified independent  Rolling/Turning Right: Modified independent  Supine to Sit: Modified Independent  Sit to Supine: Modified Independent    Transfers:  Sit <> Stand Assistance: Modified Independent (holding bed rail)  Bed <> Chair Technique: Stand Pivot  Bed <> Chair Assistance: Supervision    AM-PAC 6 CLICK MOBILITY  How much help from another person does this patient currently need?   1 = Unable, Total/Dependent Assistance  2 = A lot, Maximum/Moderate Assistance  3 = A little, Minimum/Contact " Guard/Supervision  4 = None, Modified [place]/Independent    Turning over in bed (including adjusting bedclothes, sheets and blankets)?: 4  Sitting down on and standing up from a chair with arms (e.g., wheelchair, bedside commode, etc.): 4  Moving from lying on back to sitting on the side of the bed?: 4  Moving to and from a bed to a chair (including a wheelchair)?: 3  Need to walk in hospital room?: 3  Climbing 3-5 steps with a railing?: 3  Total Score: 21    AM-PAC Raw Score CMS G-Code Modifier Level of Impairment Assistance   6 % Total / Unable   7 - 9 CM 80 - 100% Maximal Assist   10 - 14 CL 60 - 80% Moderate Assist   15 - 19 CK 40 - 60% Moderate Assist   20 - 22 CJ 20 - 40% Minimal Assist   23 CI 1-20% SBA / CGA   24 CH 0% Independent/ Mod I     Patient left sitting up in chair with BLEs elevated, all lines intact, call bell in reach, and family present.     Assessment:  Nikia Lamas is a 51 y.o. female with a medical diagnosis of Small bowel obstruction and presents with improvements in mobility. Patient limited in participation 2* n/v this afternoon.  Pt is ambulating multiple times a day in the [place], but still requesting to use a walker.  Pt expected to be able to progress to ambulation without an assistive device with continued PT services.  Will continue to progress gait and mobility as tolerated.     Rehab identified problem list/impairments: Rehab identified problem list/impairments: impaired functional mobilty, pain, impaired skin    Rehab potential is good.    Activity tolerance: Fair    Discharge recommendations: Discharge Facility/Level Of Care Needs: home with home health     Barriers to discharge: Barriers to Discharge: None    Equipment recommendations: Equipment Needed After Discharge:  (Rollator walker (pending progress), [facility])     GOALS:   Physical Therapy Goals        Problem: Physical Therapy Goal    Goal Priority Disciplines Outcome Goal Variances Interventions   Physical  Therapy Goal     PT/OT, PT Ongoing (interventions implemented as appropriate)     Description:  Goals to be met by: 2017     Patient will increase functional independence with mobility by performin. Supine to sit with Stand-by Assistance- MET  2. Sit to supine with Stand-by Assistance- MET  3. Sit to stand transfer with Modified [place]- MET  4. Bed to chair transfer with Modified [place] using no AD- MET  5. Gait  x 300 feet with Stand-by Assistance using no assistive device.                  PLAN:    Patient to be seen 4 x/week  to address the above listed problems via gait training, therapeutic activities, therapeutic exercises  Plan of Care expires: 17  Plan of Care reviewed with: patient         Zoe Friedman, PT  2017

## 2017-04-12 LAB
ALBUMIN SERPL BCP-MCNC: 1.6 G/DL
ALP SERPL-CCNC: 113 U/L
ALT SERPL W/O P-5'-P-CCNC: 13 U/L
ANION GAP SERPL CALC-SCNC: 11 MMOL/L
AST SERPL-CCNC: 19 U/L
BILIRUB SERPL-MCNC: 2.8 MG/DL
BUN SERPL-MCNC: 23 MG/DL
CALCIUM SERPL-MCNC: 8.7 MG/DL
CHLORIDE SERPL-SCNC: 96 MMOL/L
CO2 SERPL-SCNC: 27 MMOL/L
CREAT SERPL-MCNC: 0.7 MG/DL
EST. GFR  (AFRICAN AMERICAN): >60 ML/MIN/1.73 M^2
EST. GFR  (NON AFRICAN AMERICAN): >60 ML/MIN/1.73 M^2
GLUCOSE SERPL-MCNC: 96 MG/DL
MAGNESIUM SERPL-MCNC: 1.7 MG/DL
PHOSPHATE SERPL-MCNC: 3.5 MG/DL
POTASSIUM SERPL-SCNC: 3.9 MMOL/L
PROT SERPL-MCNC: 6.1 G/DL
SODIUM SERPL-SCNC: 134 MMOL/L

## 2017-04-12 PROCEDURE — 25000003 PHARM REV CODE 250: Performed by: STUDENT IN AN ORGANIZED HEALTH CARE EDUCATION/TRAINING PROGRAM

## 2017-04-12 PROCEDURE — 27000221 HC OXYGEN, UP TO 24 HOURS

## 2017-04-12 PROCEDURE — 25000003 PHARM REV CODE 250: Performed by: HOSPITALIST

## 2017-04-12 PROCEDURE — 84100 ASSAY OF PHOSPHORUS: CPT

## 2017-04-12 PROCEDURE — 11000001 HC ACUTE MED/SURG PRIVATE ROOM

## 2017-04-12 PROCEDURE — 83735 ASSAY OF MAGNESIUM: CPT

## 2017-04-12 PROCEDURE — 99233 SBSQ HOSP IP/OBS HIGH 50: CPT | Mod: ,,, | Performed by: INTERNAL MEDICINE

## 2017-04-12 PROCEDURE — 63600175 PHARM REV CODE 636 W HCPCS: Performed by: INTERNAL MEDICINE

## 2017-04-12 PROCEDURE — C9113 INJ PANTOPRAZOLE SODIUM, VIA: HCPCS | Performed by: PHYSICIAN ASSISTANT

## 2017-04-12 PROCEDURE — 63600175 PHARM REV CODE 636 W HCPCS: Performed by: STUDENT IN AN ORGANIZED HEALTH CARE EDUCATION/TRAINING PROGRAM

## 2017-04-12 PROCEDURE — 80053 COMPREHEN METABOLIC PANEL: CPT

## 2017-04-12 PROCEDURE — 63600175 PHARM REV CODE 636 W HCPCS: Performed by: PHYSICIAN ASSISTANT

## 2017-04-12 RX ADMIN — PANTOPRAZOLE SODIUM 40 MG: 40 INJECTION, POWDER, FOR SOLUTION INTRAVENOUS at 09:04

## 2017-04-12 RX ADMIN — ONDANSETRON 8 MG: 2 INJECTION INTRAMUSCULAR; INTRAVENOUS at 09:04

## 2017-04-12 RX ADMIN — Medication 3 ML: at 05:04

## 2017-04-12 RX ADMIN — ENOXAPARIN SODIUM 40 MG: 100 INJECTION SUBCUTANEOUS at 05:04

## 2017-04-12 RX ADMIN — Medication 3 ML: at 01:04

## 2017-04-12 RX ADMIN — ONDANSETRON 8 MG: 2 INJECTION INTRAMUSCULAR; INTRAVENOUS at 05:04

## 2017-04-12 RX ADMIN — RETINOL, ERGOCALCIFEROL, .ALPHA.-TOCOPHEROL ACETATE, DL-, PHYTONADIONE, ASCORBIC ACID, NIACINAMIDE, RIBOFLAVIN 5-PHOSPHATE SODIUM, THIAMINE HYDROCHLORIDE, PYRIDOXINE HYDROCHLORIDE, DEXPANTHENOL, BIOTIN, FOLIC ACID, AND CYANOCOBALAMIN: KIT at 03:04

## 2017-04-12 RX ADMIN — Medication 3 ML: at 10:04

## 2017-04-12 RX ADMIN — ONDANSETRON 8 MG: 2 INJECTION INTRAMUSCULAR; INTRAVENOUS at 12:04

## 2017-04-12 RX ADMIN — Medication: at 01:04

## 2017-04-12 RX ADMIN — Medication: at 10:04

## 2017-04-12 NOTE — PROGRESS NOTES
Palliative Care Daily Progress Note   4/12/17    A/P: 51 yr old woman with metastatic renal cell ca to the lungs, now with peritoneal carcinomatosis, on TPN, admitted for terminal/malignant SBO.  Pal med following for goals of care and symptom management.      Plan/Recommendations:      1. Pain: Nociceptive somatic + visceral due to underlying cancer  - Pain controlled to the point patient is able to ambulate multiple times per day, and PCA requirements have decreased significantly since increasing fentanyl patch yesterday  - Continue 200 mcg fentanyl patch (2 x 100 mcg patches in place currently)  - Escalate dilaudid PCA dosage to 0.6 mg bolus, q20 min lockout   - Given that patient's g-tube unable to be clamped and is currently draining to gravity, patient would not benefit from oral medications for breakthrough pain. As such, recommend continuing PCA both now and at discharge (patient has PICC for TPN)   - With pt's current sx burden and escalating sx needs, home may be an unrealistic dispo option  - If pt unable to have home PCA, makes this even less realistic  - If patient's code status changes and goal of care becomes more comfort-focused, would recommend starting 6 mg IV decadron daily, to help inflammation and edema in patients with terminal bowel obstruction. However, given that patient is currently on TPN would not recommend this at this time currently as she is already high risk for infection secondary to such.     2. Constipation: Bowel obstruction, recs per surgical team, decompressive PEG in place, draining     3. Nausea: Recommend continuing scheduled Zofran 8 mg IV Q8 and PRN phenergan suppositories      4. Goals of Care:   - Current plan for time limited trial (2-4 weeks max) with TPN at home to see if patient a candidate for further therapies though suspect this is very unlikely.   - Inpatient hospice setting would be ideal place for pt to receive the kind of intensive sx management that she will  "need moving forward.     - May be worth revisiting hospice care, particularly if current insurance unable to accommodate home with both TPN and dilaudid PCA pump   - Ideally, code status should be addressed prior to d/c.  Palliative care able to discuss if the primary team desires this    Pt seen and examined with staff, Dr. Perez. Palliative care will continue to follow patient. Staff attestation to follow.     Sisi Albarran MD  IM PGY-2    /77 (BP Location: Left arm, Patient Position: Lying, BP Method: Automatic)  Pulse (!) 125  Temp 98 °F (36.7 °C) (Oral)   Resp 17  Ht 5' 4" (1.626 m)  Wt 40.8 kg (90 lb)  LMP 01/03/2017  SpO2 (!) 92%  Breastfeeding? No  BMI 15.45 kg/m2  Gen: comfortable, sitting in up in bed, writing on legal pads  HEENT: WNL  CV/Resp: breathing comfortably, on 4L NC oxygen  Abd: midline abd incision no erythema or drainage  Venting G tube; + BS  Extremities: bilateral lower extremity pitting edema, left > right  Neuro: AAO x 3, interactive and appropriate    SUBJECTIVE:      Interval History: NAEON. Pain no more than 4/10, goes down to about 3/10 with PCA, says she is pressing button 1-2 times/hr. Ambulated 4 times in hallway in last 24 hours. Still no BM or flatus passed.     HPI: 50 yo F with PMHx renal cell carcinoma with mets to liver and bone s/p L nephrectomy 01/2017 presents with 8 d constipation with associated nausea/vomiting intermittently for past 7 days. She notes difficulty keeping down any food originally but had been using some Ensure supplements. Had been doing daily PEG 17 g but decided to try a magnesium citrate due to claims to help with abdominal cramping. She had no relief with this. Tried lactulose with increase in abd pain and cramping and a couple limited watery bowel movements after using this. Notes approx 4 lb weight loss over past week or so. She has been on cabozantinib 60 mg po qd since 01/25/17 with no issues with bowel obstruction since starting " chemo. Has abdominal procedures in [facility] including recent L nephrectomy, 2 C sections, and tubal ligation.

## 2017-04-12 NOTE — ASSESSMENT & PLAN NOTE
- Continue pain control, palliative care helping with pain management  - she has a fentanyl 200 mcg/hr patch with dilaudid PCA for breakthrough.

## 2017-04-12 NOTE — ASSESSMENT & PLAN NOTE
- secondary to advanced cancer, bowel obstruction, and peritoneal carcinomatosis with omental caking  - venting gastrostomy tube has been placed  - continue total parenteral nutrition per surgery  - hyperbilirubinemia secondary to TPN. Continue to monitor.

## 2017-04-12 NOTE — ASSESSMENT & PLAN NOTE
- will need to follow up with Dr. Connelly in clinic after discharge for further management of cancer   - Ex lap revealed omental caking, biopsy revealed poorly differentiated carcinoma  - goal is to discharge home with home health in next 4-72 hrs.  - likely not a candidate for further chemotherapy; however, patient will follow up with Dr. Connelly after discharge.  - continue palliative care   - she has a fentanyl 200 mcg/hr patch with dilaudid PCA for breakthrough.

## 2017-04-12 NOTE — SUBJECTIVE & OBJECTIVE
Interval History:   - no acute events overnight. She slept about 2.5 - 3 hrs overnight. She is scared to sleep longer because she is afraid of waking up in pain. So, she wakes up and presses the PCA pump button, almost in advance of worsening pain. She denies shortness of breath, chest pain, output per rectum, dysuria. She is using the incentive spirometer, max is about 750 cc.    Oncology Treatment Plan:   [No treatment plan]    Medications:  Continuous Infusions:   Amino acid 5% - dextrose 20% (CLINIMIX-E) solution with additives (1L provides 50 gm AA, 200 gm CHO (680 kcal/L dextrose), Na 35, K 30, Mg 5, Ca 4.5, Acetate 80, Cl 39, Phos 15)      hydromorphone in 0.9 % NaCl 6 mg/30 ml       Scheduled Meds:   enoxaparin  40 mg Subcutaneous Q24H    fentaNYL  2 patch Transdermal Q72H    ondansetron  8 mg Intravenous Q8H    pantoprazole  40 mg Intravenous Daily    sodium chloride 0.9%  10 mL Intravenous Q6H    sodium chloride 0.9%  3 mL Intravenous Q8H     PRN Meds:naloxone, promethazine, Flushing PICC Protocol **AND** sodium chloride 0.9% **AND** sodium chloride 0.9%     Review of Systems   Constitutional: Negative for chills and fever.   HENT: Negative for congestion.    Respiratory: Negative for cough and shortness of breath.    Cardiovascular: Negative for chest pain, palpitations and leg swelling.   Gastrointestinal: Positive for abdominal pain, constipation and nausea. Negative for abdominal distention and blood in stool.   Genitourinary: Negative for dysuria and hematuria.   Skin: Negative for rash and wound.     Objective:     Vital Signs (Most Recent):  Temp: 98 °F (36.7 °C) (04/12/17 0451)  Pulse: (!) 125 (04/12/17 0451)  Resp: 17 (04/12/17 0451)  BP: 113/77 (04/12/17 0451)  SpO2: (!) 92 % (04/12/17 0451) Vital Signs (24h Range):  Temp:  [97.6 °F (36.4 °C)-99.8 °F (37.7 °C)] 98 °F (36.7 °C)  Pulse:  [] 125  Resp:  [16-18] 17  SpO2:  [92 %-98 %] 92 %  BP: (106-119)/(74-86) 113/77     Weight: 40.8  kg (90 lb)  Body mass index is 15.45 kg/(m^2).  Body surface area is 1.36 meters squared.      Intake/Output Summary (Last 24 hours) at 04/12/17 0915  Last data filed at 04/11/17 1348   Gross per 24 hour   Intake              690 ml   Output              450 ml   Net              240 ml       Physical Exam   Constitutional: She is oriented to person, place, and time. She appears well-developed.   Fatigued, malnourished   HENT:   Head: Normocephalic.   Eyes: Pupils are equal, round, and reactive to light.   Neck: Neck supple. No JVD present.   Cardiovascular: Normal rate, regular rhythm and normal heart sounds.    No murmur heard.  Pulmonary/Chest: Effort normal. No respiratory distress. She has decreased breath sounds. She has no wheezes. She has no rhonchi.   Abdominal:   middle vertical abdominal incision with staples. Venting gastrostomy tube noted in left abdominal quadrant. Hypoactive bowel sounds.   Musculoskeletal: She exhibits no edema.   Neurological: She is oriented to person, place, and time.   Skin: Skin is warm.   Psychiatric: She has a normal mood and affect.     Significant Labs:   Labs have been reviewed.

## 2017-04-12 NOTE — ASSESSMENT & PLAN NOTE
- adhesions and ascites seen during Ex Lap with thickening of peritoneum and omental caking.   - POD 5, still without flatus/BM   - continue NPO except water and ice chips  - biopsy shows poorly differentiated carcinoma  - patient has venting gastrostomy tube to gravity  - continue pain control per palliative care. We will ask our  to see if there is a home health agency that will accommodate a PCA pump.  - continue total parenteral nutrition.

## 2017-04-12 NOTE — SIGNIFICANT EVENT
Called by nursing for increased abdominal pain, distention and rigidity. Evaluated at bedside. Patient complained of a change in the quality of her abdominal pain, it reminded her of the pain she had prior to surgery when there was concern for SBO. She states she has not had a BM since that surgery.     On exam, abdomen is rigid, distended, and tender. Ordered stat KUB to assess for perforation. Spoke with General Surgery who will evaluate the patient, unclear if she would be a good surgical candidate. Discussed with Night Float, and asked to follow up on KUB and with General Surgery. Patient has PICC line and PIV.     Plan  - Follow up KUB  - Follow up with General Surgery   - If no perforation on KUB and surgery not indicated, would consider enema given no BM since 4/5/17 (1 week ago).     Discussed with Fellow, Dr. Munoz.   LEVI Valdivia MD MPH PGY-1  Medical Oncology

## 2017-04-12 NOTE — PROGRESS NOTES
"[facility]  Hematology/Oncology  Progress Note    Patient Name: Nikia Lamas  Admission Date: 3/31/2017  Hospital Length of Stay: 12 days  Code Status: Full Code     Subjective:     HPI:  52 yo F with PMHx renal cell carcinoma with mets to liver and bone s/p L nephrectomy 01/2017 presents with 8 d constipation with associated nausea/vomiting intermittently for past 7 days.  She notes difficulty keeping down any food originally but had been using some Ensure supplements.  Had been doing daily PEG 17 g but decided to try a magnesium citrate due to claims to help with abdominal cramping.  She had no relief with this.  Tried lactulose with increase in abd pain and cramping and a couple limited watery bowel movements after using this.  Notes approx 4 lb weight loss over past week or so.  She has been on cabozantinib 60 mg po qd since 01/25/17 with no issues with bowel obstruction since starting chemo.  Has abdominal procedures in [facility] including recent L nephrectomy, 2 C sections, and tubal ligation.       Oncologic History (per Dr. Connelly's note dated 3/24/17):  Nikia Lamas is a 51 y.o. White female, referred by Dr. German, for management of metastatic renal cell carcinoma. Pt reports left mass was found incidentally while being worked up for a persistent dry cough that began in October 2016. She was evaluated with a CXR and a non-contrast chest CT.   12/14/16- CT of the chest without contrast showed multiple pulmonary nodes and a large left renal mass present. The L kidney was not fully evaluated as only the superior aspect was in the CT, but the mass was ~ 11 cm in diameter.   12/19/16- CT CAP reveals "Large heterogeneous left renal mass, consistent with renal cell carcinoma.  This mass does not appear to invade the renal vein or IVC.  Innumerable pulmonary nodules, retroperitoneal lymphadenopathy and osseous lytic lesions, concerning for metastatic disease. Indeterminate hepatic " "lesions, possibly metastatic disease."  1/3/17- Radical left nephrectomy    Interval History:   - no acute events overnight. She slept about 2.5 - 3 hrs overnight. She is scared to sleep longer because she is afraid of waking up in pain. So, she wakes up and presses the PCA pump button, almost in advance of worsening pain. She denies shortness of breath, chest pain, output per rectum, dysuria. She is using the incentive spirometer, max is about 750 cc.    Oncology Treatment Plan:   [No treatment plan]    Medications:  Continuous Infusions:   Amino acid 5% - dextrose 20% (CLINIMIX-E) solution with additives (1L provides 50 gm AA, 200 gm CHO (680 kcal/L dextrose), Na 35, K 30, Mg 5, Ca 4.5, Acetate 80, Cl 39, Phos 15)      hydromorphone in 0.9 % NaCl 6 mg/30 ml       Scheduled Meds:   enoxaparin  40 mg Subcutaneous Q24H    fentaNYL  2 patch Transdermal Q72H    ondansetron  8 mg Intravenous Q8H    pantoprazole  40 mg Intravenous Daily    sodium chloride 0.9%  10 mL Intravenous Q6H    sodium chloride 0.9%  3 mL Intravenous Q8H     PRN Meds:naloxone, promethazine, Flushing PICC Protocol **AND** sodium chloride 0.9% **AND** sodium chloride 0.9%     Review of Systems   Constitutional: Negative for chills and fever.   HENT: Negative for congestion.    Respiratory: Negative for cough and shortness of breath.    Cardiovascular: Negative for chest pain, palpitations and leg swelling.   Gastrointestinal: Positive for abdominal pain, constipation and nausea. Negative for abdominal distention and blood in stool.   Genitourinary: Negative for dysuria and hematuria.   Skin: Negative for rash and wound.     Objective:     Vital Signs (Most Recent):  Temp: 98 °F (36.7 °C) (04/12/17 0451)  Pulse: (!) 125 (04/12/17 0451)  Resp: 17 (04/12/17 0451)  BP: 113/77 (04/12/17 0451)  SpO2: (!) 92 % (04/12/17 0451) Vital Signs (24h Range):  Temp:  [97.6 °F (36.4 °C)-99.8 °F (37.7 °C)] 98 °F (36.7 °C)  Pulse:  [] 125  Resp:  [16-18] " 17  SpO2:  [92 %-98 %] 92 %  BP: (106-119)/(74-86) 113/77     Weight: 40.8 kg (90 lb)  Body mass index is 15.45 kg/(m^2).  Body surface area is 1.36 meters squared.      Intake/Output Summary (Last 24 hours) at 04/12/17 0915  Last data filed at 04/11/17 1348   Gross per 24 hour   Intake              690 ml   Output              450 ml   Net              240 ml       Physical Exam   Constitutional: She is oriented to person, place, and time. She appears well-developed.   Fatigued, malnourished   HENT:   Head: Normocephalic.   Eyes: Pupils are equal, round, and reactive to light.   Neck: Neck supple. No JVD present.   Cardiovascular: Normal rate, regular rhythm and normal heart sounds.    No murmur heard.  Pulmonary/Chest: Effort normal. No respiratory distress. She has decreased breath sounds. She has no wheezes. She has no rhonchi.   Abdominal:   middle vertical abdominal incision with staples. Venting gastrostomy tube noted in left abdominal quadrant. Hypoactive bowel sounds.   Musculoskeletal: She exhibits no edema.   Neurological: She is oriented to person, place, and time.   Skin: Skin is warm.   Psychiatric: She has a normal mood and affect.     Significant Labs:   Labs have been reviewed.    Assessment/Plan:     * Small bowel obstruction  - adhesions and ascites seen during Ex Lap with thickening of peritoneum and omental caking.   - POD 5, still without flatus/BM   - continue NPO except water and ice chips  - biopsy shows poorly differentiated carcinoma  - patient has venting gastrostomy tube to gravity  - continue pain control per palliative care. We will ask our  to see if there is a home health agency that will accommodate a PCA pump.  - continue total parenteral nutrition.    Metastatic renal cell carcinoma  - will need to follow up with Dr. Connelly in clinic after discharge for further management of cancer   - Ex lap revealed omental caking, biopsy revealed poorly differentiated carcinoma  -  goal is to discharge home with home health in next 4-72 hrs.  - likely not a candidate for further chemotherapy; however, patient will follow up with Dr. Connelly after discharge.  - continue palliative care   - she has a fentanyl 200 mcg/hr patch with dilaudid PCA for breakthrough.    Bone metastases  - Continue pain control, palliative care helping with pain management  - she has a fentanyl 200 mcg/hr patch with dilaudid PCA for breakthrough.    Retroperitoneal lymphadenopathy  See above    Acute respiratory failure with hypoxia  - secondary to pulmonary metastases, volume overload/pulmonary edema, and inability to take deep inspirations due to abdominal pain  - continue supplemental oxygen, wean off for SpO2 goal of >90%   - requiring 2-4L NC  - incentive spirometry and PT/OT for atelectasis    Lung metastases  - see above    Protein-calorie malnutrition, severe  - secondary to advanced cancer, bowel obstruction, and peritoneal carcinomatosis with omental caking  - venting gastrostomy tube has been placed  - continue total parenteral nutrition per surgery  - hyperbilirubinemia secondary to TPN. Continue to monitor.    DVT prophylaxis: enoxaparin    Disposition: continue aggressive pain control. Advance diet to clear liquids. Ask  to see if there is a home health facility that will accommodate a PCA pump. Possible discharge in 48-72 hours.     Aki Munoz MD  Hematology/Oncology  [facility]      ATTENDING NOTE, ONCOLOGY INPATIENT TEAM    As above; events of last 24 hours noted.  Patient seen and examined, chart reviewed.  Appears relatively comfortable; on fentanyl 200 mcvg/hour plus dilaudid prn through the PCA..  Lungs are clear to auscultation.  Abdomen is soft, with mild tenderness.  Bowel sounds are present today buts he has not had a BM.  Labs reviewed.    PLAN  Follow electrolytes daily.  Follow LFTs;  I suspect the rise in her bilirubin may be TPN related rather than  from a biliary obstruction.  Comfort feedings prn.  We need to find out whether her home health agency can hanlde a PCA pump at home.  Continue enoxaparin.  Patient advised to increase ambulation.      Cecilio Monsalve MD

## 2017-04-13 PROBLEM — D64.9 ANEMIA: Status: ACTIVE | Noted: 2017-04-13

## 2017-04-13 LAB
ALBUMIN SERPL BCP-MCNC: 1.3 G/DL
ALP SERPL-CCNC: 95 U/L
ALT SERPL W/O P-5'-P-CCNC: 9 U/L
ANION GAP SERPL CALC-SCNC: 8 MMOL/L
ANISOCYTOSIS BLD QL SMEAR: SLIGHT
AST SERPL-CCNC: 15 U/L
BASOPHILS # BLD AUTO: ABNORMAL K/UL
BASOPHILS NFR BLD: 0 %
BILIRUB DIRECT SERPL-MCNC: 2.6 MG/DL
BILIRUB DIRECT SERPL-MCNC: 3.4 MG/DL
BILIRUB SERPL-MCNC: 3.4 MG/DL
BUN SERPL-MCNC: 28 MG/DL
CALCIUM SERPL-MCNC: 7.1 MG/DL
CHLORIDE SERPL-SCNC: 103 MMOL/L
CO2 SERPL-SCNC: 23 MMOL/L
CREAT SERPL-MCNC: 0.6 MG/DL
DIFFERENTIAL METHOD: ABNORMAL
EOSINOPHIL # BLD AUTO: ABNORMAL K/UL
EOSINOPHIL NFR BLD: 3 %
ERYTHROCYTE [DISTWIDTH] IN BLOOD BY AUTOMATED COUNT: 21.2 %
EST. GFR  (AFRICAN AMERICAN): >60 ML/MIN/1.73 M^2
EST. GFR  (NON AFRICAN AMERICAN): >60 ML/MIN/1.73 M^2
GIANT PLATELETS BLD QL SMEAR: PRESENT
GLUCOSE SERPL-MCNC: 108 MG/DL
HAPTOGLOB SERPL-MCNC: 372 MG/DL
HAPTOGLOB SERPL-MCNC: 486 MG/DL
HCT VFR BLD AUTO: 24.9 %
HGB BLD-MCNC: 8 G/DL
HYPOCHROMIA BLD QL SMEAR: ABNORMAL
LDH SERPL L TO P-CCNC: 291 U/L
LYMPHOCYTES # BLD AUTO: ABNORMAL K/UL
LYMPHOCYTES NFR BLD: 4 %
MAGNESIUM SERPL-MCNC: 1.5 MG/DL
MCH RBC QN AUTO: 26.8 PG
MCHC RBC AUTO-ENTMCNC: 32.1 %
MCV RBC AUTO: 83 FL
MONOCYTES # BLD AUTO: ABNORMAL K/UL
MONOCYTES NFR BLD: 10 %
NEUTROPHILS NFR BLD: 62 %
NEUTS BAND NFR BLD MANUAL: 21 %
OVALOCYTES BLD QL SMEAR: ABNORMAL
PHOSPHATE SERPL-MCNC: 3.2 MG/DL
PLATELET # BLD AUTO: 242 K/UL
PLATELET BLD QL SMEAR: ABNORMAL
PMV BLD AUTO: 10.6 FL
POIKILOCYTOSIS BLD QL SMEAR: SLIGHT
POLYCHROMASIA BLD QL SMEAR: ABNORMAL
POTASSIUM SERPL-SCNC: 3.3 MMOL/L
PROT SERPL-MCNC: 5 G/DL
RBC # BLD AUTO: 2.99 M/UL
RETICS/RBC NFR AUTO: 1 %
RETICS/RBC NFR AUTO: 1 %
SODIUM SERPL-SCNC: 134 MMOL/L
WBC # BLD AUTO: 12.67 K/UL

## 2017-04-13 PROCEDURE — 85045 AUTOMATED RETICULOCYTE COUNT: CPT | Mod: 91

## 2017-04-13 PROCEDURE — 83010 ASSAY OF HAPTOGLOBIN QUANT: CPT

## 2017-04-13 PROCEDURE — 84100 ASSAY OF PHOSPHORUS: CPT

## 2017-04-13 PROCEDURE — 83735 ASSAY OF MAGNESIUM: CPT

## 2017-04-13 PROCEDURE — 85007 BL SMEAR W/DIFF WBC COUNT: CPT

## 2017-04-13 PROCEDURE — 80053 COMPREHEN METABOLIC PANEL: CPT

## 2017-04-13 PROCEDURE — 83615 LACTATE (LD) (LDH) ENZYME: CPT

## 2017-04-13 PROCEDURE — 83010 ASSAY OF HAPTOGLOBIN QUANT: CPT | Mod: 91

## 2017-04-13 PROCEDURE — 36415 COLL VENOUS BLD VENIPUNCTURE: CPT

## 2017-04-13 PROCEDURE — 25000003 PHARM REV CODE 250: Performed by: INTERNAL MEDICINE

## 2017-04-13 PROCEDURE — 85027 COMPLETE CBC AUTOMATED: CPT

## 2017-04-13 PROCEDURE — 63600175 PHARM REV CODE 636 W HCPCS: Performed by: STUDENT IN AN ORGANIZED HEALTH CARE EDUCATION/TRAINING PROGRAM

## 2017-04-13 PROCEDURE — 99233 SBSQ HOSP IP/OBS HIGH 50: CPT | Mod: ,,, | Performed by: INTERNAL MEDICINE

## 2017-04-13 PROCEDURE — 85045 AUTOMATED RETICULOCYTE COUNT: CPT

## 2017-04-13 PROCEDURE — 63600175 PHARM REV CODE 636 W HCPCS: Performed by: INTERNAL MEDICINE

## 2017-04-13 PROCEDURE — 27000221 HC OXYGEN, UP TO 24 HOURS

## 2017-04-13 PROCEDURE — 82248 BILIRUBIN DIRECT: CPT

## 2017-04-13 PROCEDURE — C9113 INJ PANTOPRAZOLE SODIUM, VIA: HCPCS | Performed by: PHYSICIAN ASSISTANT

## 2017-04-13 PROCEDURE — 11000001 HC ACUTE MED/SURG PRIVATE ROOM

## 2017-04-13 PROCEDURE — 63600175 PHARM REV CODE 636 W HCPCS: Performed by: PHYSICIAN ASSISTANT

## 2017-04-13 PROCEDURE — 25000003 PHARM REV CODE 250: Performed by: STUDENT IN AN ORGANIZED HEALTH CARE EDUCATION/TRAINING PROGRAM

## 2017-04-13 PROCEDURE — 82248 BILIRUBIN DIRECT: CPT | Mod: 91

## 2017-04-13 RX ORDER — POTASSIUM CHLORIDE 7.45 MG/ML
10 INJECTION INTRAVENOUS
Status: COMPLETED | OUTPATIENT
Start: 2017-04-13 | End: 2017-04-13

## 2017-04-13 RX ORDER — MAGNESIUM SULFATE HEPTAHYDRATE 40 MG/ML
2 INJECTION, SOLUTION INTRAVENOUS ONCE
Status: COMPLETED | OUTPATIENT
Start: 2017-04-13 | End: 2017-04-13

## 2017-04-13 RX ADMIN — Medication 10 ML: at 01:04

## 2017-04-13 RX ADMIN — PANTOPRAZOLE SODIUM 40 MG: 40 INJECTION, POWDER, FOR SOLUTION INTRAVENOUS at 09:04

## 2017-04-13 RX ADMIN — Medication 10 ML: at 05:04

## 2017-04-13 RX ADMIN — Medication: at 11:04

## 2017-04-13 RX ADMIN — ONDANSETRON 8 MG: 2 INJECTION INTRAMUSCULAR; INTRAVENOUS at 07:04

## 2017-04-13 RX ADMIN — Medication: at 06:04

## 2017-04-13 RX ADMIN — POTASSIUM CHLORIDE 10 MEQ: 10 INJECTION, SOLUTION INTRAVENOUS at 11:04

## 2017-04-13 RX ADMIN — Medication 3 ML: at 05:04

## 2017-04-13 RX ADMIN — Medication 3 ML: at 06:04

## 2017-04-13 RX ADMIN — Medication: at 07:04

## 2017-04-13 RX ADMIN — Medication 3 ML: at 10:04

## 2017-04-13 RX ADMIN — MAGNESIUM SULFATE IN WATER 2 G: 40 INJECTION, SOLUTION INTRAVENOUS at 05:04

## 2017-04-13 RX ADMIN — Medication 10 ML: at 11:04

## 2017-04-13 RX ADMIN — POTASSIUM CHLORIDE 10 MEQ: 10 INJECTION, SOLUTION INTRAVENOUS at 09:04

## 2017-04-13 RX ADMIN — ONDANSETRON 8 MG: 2 INJECTION INTRAMUSCULAR; INTRAVENOUS at 05:04

## 2017-04-13 RX ADMIN — ONDANSETRON 8 MG: 2 INJECTION INTRAMUSCULAR; INTRAVENOUS at 01:04

## 2017-04-13 RX ADMIN — ENOXAPARIN SODIUM 40 MG: 100 INJECTION SUBCUTANEOUS at 05:04

## 2017-04-13 RX ADMIN — POTASSIUM CHLORIDE 10 MEQ: 10 INJECTION, SOLUTION INTRAVENOUS at 10:04

## 2017-04-13 NOTE — PLAN OF CARE
MDR's with Dr Stoner.  Patient c/o of severe abdominal pain overnight.  PCA and TPN continued.  Plan to continue TPN at d/c.  D/c not anticipated this weekend.  Will continue to follow.       04/13/17 1049   Discharge Reassessment   Assessment Type Discharge Planning Reassessment   Discharge Plan A Home with family;Home Health

## 2017-04-13 NOTE — PROGRESS NOTES
[facility]  Hematology/Oncology  Progress Note    Patient Name: Nikia Lamas  Admission Date: 3/31/2017  Hospital Length of Stay: 13 days  Code Status: Full Code     Subjective:       Interval History:   Patient had increased abdominal pain yesterday evening, stated it was similar to when she presented to hospital with obstruction. Pain better controlled this morning but she was struggling overnight. Still no BM.     Oncology Treatment Plan:   [No treatment plan]    Medications:  Continuous Infusions:   hydromorphone in 0.9 % NaCl 6 mg/30 ml       Scheduled Meds:   enoxaparin  40 mg Subcutaneous Q24H    fentaNYL  2 patch Transdermal Q72H    magnesium sulfate IVPB  2 g Intravenous Once    ondansetron  8 mg Intravenous Q8H    pantoprazole  40 mg Intravenous Daily    potassium chloride  10 mEq Intravenous Q1H    sodium chloride 0.9%  10 mL Intravenous Q6H    sodium chloride 0.9%  3 mL Intravenous Q8H     PRN Meds:naloxone, promethazine, Flushing PICC Protocol **AND** sodium chloride 0.9% **AND** sodium chloride 0.9%     Review of Systems   Constitutional: Negative for chills and fever.   HENT: Negative for congestion.    Respiratory: Negative for cough and shortness of breath.    Cardiovascular: Negative for chest pain, palpitations and leg swelling.   Gastrointestinal: Positive for abdominal pain, constipation and nausea. Negative for abdominal distention and blood in stool.   Genitourinary: Negative for dysuria and hematuria.   Skin: Negative for rash and wound.     Objective:     Vital Signs (Most Recent):  Temp: 97.7 °F (36.5 °C) (04/13/17 0500)  Pulse: (!) 113 (04/13/17 0500)  Resp: 18 (04/13/17 0500)  BP: 111/74 (04/13/17 0500)  SpO2: 97 % (04/13/17 0500) Vital Signs (24h Range):  Temp:  [96.5 °F (35.8 °C)-98 °F (36.7 °C)] 97.7 °F (36.5 °C)  Pulse:  [113-130] 113  Resp:  [16-20] 18  SpO2:  [95 %-97 %] 97 %  BP: (107-137)/(72-86) 111/74     Weight: 40.8 kg (90 lb)  Body mass index  is 15.45 kg/(m^2).  Body surface area is 1.36 meters squared.      Intake/Output Summary (Last 24 hours) at 04/13/17 0827  Last data filed at 04/12/17 2202   Gross per 24 hour   Intake                0 ml   Output              800 ml   Net             -800 ml       Physical Exam   Constitutional: She is oriented to person, place, and time. She appears well-developed.   Fatigued, malnourished   HENT:   Head: Normocephalic.   Eyes: Pupils are equal, round, and reactive to light.   Neck: Neck supple. No JVD present.   Cardiovascular: Normal rate, regular rhythm and normal heart sounds.    No murmur heard.  Pulmonary/Chest: Effort normal. No respiratory distress. She has decreased breath sounds. She has no wheezes. She has no rhonchi.   Abdominal:   middle vertical abdominal incision with staples. Venting gastrostomy tube noted in left abdominal quadrant. Hypoactive bowel sounds.   Musculoskeletal: She exhibits no edema.   Neurological: She is oriented to person, place, and time.   Skin: Skin is warm.   Psychiatric: She has a normal mood and affect.     Significant Labs:   Labs have been reviewed.      Recent Labs  Lab 04/13/17  0455   WBC 12.67   RBC 2.99*   HGB 8.0*   HCT 24.9*      MCV 83   MCH 26.8*   MCHC 32.1       Recent Labs  Lab 04/13/17  0455   CALCIUM 7.1*   PROT 5.0*   *   K 3.3*   CO2 23      BUN 28*   CREATININE 0.6   ALKPHOS 95   ALT 9*   AST 15   BILITOT 3.4*     Imaging: AXR: no obstruction noted    Assessment/Plan:     * Small bowel obstruction  - adhesions and ascites seen during Ex Lap with thickening of peritoneum and omental caking.   - POD 5, still without flatus/BM   - continue NPO except water and ice chips  - biopsy shows poorly differentiated carcinoma  - patient has venting gastrostomy tube to gravity  - continue pain control per palliative care. We will ask our  to see if there is a home health agency that will accommodate a PCA pump.  - continue total  parenteral nutrition.    Metastatic renal cell carcinoma  - will need to follow up with Dr. Connelly in clinic after discharge for further management of cancer   - Ex lap revealed omental caking, biopsy revealed poorly differentiated carcinoma  - goal is to discharge home with home health in next 4-72 hrs.  - likely not a candidate for further chemotherapy; however, patient will follow up with Dr. Connelly after discharge.  - continue palliative care   - she has a fentanyl 200 mcg/hr patch with dilaudid PCA for breakthrough which will be increased to 0.6mg per palliative care    Acute respiratory failure with hypoxia  - secondary to pulmonary metastases, volume overload/pulmonary edema, and inability to take deep inspirations due to abdominal pain  - continue supplemental oxygen, wean off for SpO2 goal of >90%   - requiring 2-4L NC  - incentive spirometry and PT/OT for atelectasis    Protein-calorie malnutrition, severe  - secondary to advanced cancer, bowel obstruction, and peritoneal carcinomatosis with omental caking  - venting gastrostomy tube has been placed  - continue total parenteral nutrition per surgery  - hyperbilirubinemia secondary to TPN. Continue to monitor.    Anemia  - Hb on 4/10 was 11.4, now 8.0  - no acute bleeding noted,   - G tube to gravity and no blood in fluid   - no hematuria   - patient yet to have BM   - will check hemolysis labs    Dispo: pain control with PCA and TPN to go home if possible.        Tricia Charles MD  Hematology/Oncology  [facility]      ATTENDING NOTE, ONCOLOGY INPATIENT TEAM    As above; events of last 24 hours noted.  Patient seen and examined, chart reviewed.  Appears slightly uncomfortable, but in NAD.  Lungs are clear to auscultation.  Abdomen is soft, with mild tenderness.  No bowel sounds today  Labs reviewed.  Abdominal x ray seen.    PLAN  Follow electrolytes and CBC daily.  Continue TPN  Continue fentanyl at 200 mcg plus hydromorphone prn.  Enema  today given the barium we see in the ascending colon.  Continue DVT prophylaxis.  Her questions were answered to her satisfaction.      Cecilio Monsalve MD

## 2017-04-13 NOTE — PLAN OF CARE
Problem: Patient Care Overview  Goal: Plan of Care Review  Outcome: Ongoing (interventions implemented as appropriate)  AAO X 3. VSS, No Falls noted. Fall precautions remain. Skin Intact. Pt encouraged to eat and drink. Pain assessed. Pain Medication given. Pt call light in reach, bed down and locked. Will continue to monitor.

## 2017-04-13 NOTE — NURSING
Pt complained of abdominal pain and tenderness. Assessed skin and noticed abdomen was taut and shiny. MD was called and scheduled STAT X-Ray. Will continue to monitor

## 2017-04-13 NOTE — SUBJECTIVE & OBJECTIVE
Interval History:   Patient had increased abdominal pain yesterday evening, stated it was similar to when she presented to hospital with obstruction. Pain better controlled this morning but she was struggling overnight. Still no BM.     Oncology Treatment Plan:   [No treatment plan]    Medications:  Continuous Infusions:   hydromorphone in 0.9 % NaCl 6 mg/30 ml       Scheduled Meds:   enoxaparin  40 mg Subcutaneous Q24H    fentaNYL  2 patch Transdermal Q72H    magnesium sulfate IVPB  2 g Intravenous Once    ondansetron  8 mg Intravenous Q8H    pantoprazole  40 mg Intravenous Daily    potassium chloride  10 mEq Intravenous Q1H    sodium chloride 0.9%  10 mL Intravenous Q6H    sodium chloride 0.9%  3 mL Intravenous Q8H     PRN Meds:naloxone, promethazine, Flushing PICC Protocol **AND** sodium chloride 0.9% **AND** sodium chloride 0.9%     Review of Systems   Constitutional: Negative for chills and fever.   HENT: Negative for congestion.    Respiratory: Negative for cough and shortness of breath.    Cardiovascular: Negative for chest pain, palpitations and leg swelling.   Gastrointestinal: Positive for abdominal pain, constipation and nausea. Negative for abdominal distention and blood in stool.   Genitourinary: Negative for dysuria and hematuria.   Skin: Negative for rash and wound.     Objective:     Vital Signs (Most Recent):  Temp: 97.7 °F (36.5 °C) (04/13/17 0500)  Pulse: (!) 113 (04/13/17 0500)  Resp: 18 (04/13/17 0500)  BP: 111/74 (04/13/17 0500)  SpO2: 97 % (04/13/17 0500) Vital Signs (24h Range):  Temp:  [96.5 °F (35.8 °C)-98 °F (36.7 °C)] 97.7 °F (36.5 °C)  Pulse:  [113-130] 113  Resp:  [16-20] 18  SpO2:  [95 %-97 %] 97 %  BP: (107-137)/(72-86) 111/74     Weight: 40.8 kg (90 lb)  Body mass index is 15.45 kg/(m^2).  Body surface area is 1.36 meters squared.      Intake/Output Summary (Last 24 hours) at 04/13/17 0858  Last data filed at 04/12/17 2208   Gross per 24 hour   Intake                0 ml    Output              800 ml   Net             -800 ml       Physical Exam   Constitutional: She is oriented to person, place, and time. She appears well-developed.   Fatigued, malnourished   HENT:   Head: Normocephalic.   Eyes: Pupils are equal, round, and reactive to light.   Neck: Neck supple. No JVD present.   Cardiovascular: Normal rate, regular rhythm and normal heart sounds.    No murmur heard.  Pulmonary/Chest: Effort normal. No respiratory distress. She has decreased breath sounds. She has no wheezes. She has no rhonchi.   Abdominal:   middle vertical abdominal incision with staples. Venting gastrostomy tube noted in left abdominal quadrant. Hypoactive bowel sounds.   Musculoskeletal: She exhibits no edema.   Neurological: She is oriented to person, place, and time.   Skin: Skin is warm.   Psychiatric: She has a normal mood and affect.     Significant Labs:   Labs have been reviewed.      Recent Labs  Lab 04/13/17  0455   WBC 12.67   RBC 2.99*   HGB 8.0*   HCT 24.9*      MCV 83   MCH 26.8*   MCHC 32.1       Recent Labs  Lab 04/13/17  0455   CALCIUM 7.1*   PROT 5.0*   *   K 3.3*   CO2 23      BUN 28*   CREATININE 0.6   ALKPHOS 95   ALT 9*   AST 15   BILITOT 3.4*     Imaging: AXR: no obstruction noted

## 2017-04-13 NOTE — PT/OT/SLP PROGRESS
Physical Therapy      Nikia Lamas  MRN: [id number]    Patient not seen today secondary to patient sleeping.  Attempted to see patient in the morning, but patient sleeping in bed and family asked not to disturb.  Therapist unable to return later in the day. Will follow-up as able.  Patient is ambulating in the [place] with family assist during the day.    Zoe Friedman, PT   4/13/2017

## 2017-04-13 NOTE — PROGRESS NOTES
Palliative Care Daily Progress Note   4/13/17    A/P: 51 yr old woman with metastatic renal cell ca to the lungs, now with peritoneal carcinomatosis, on TPN, admitted for terminal/malignant SBO.  Pal med following for goals of care and symptom management.      Plan/Recommendations:      1. Pain: Nociceptive somatic + visceral due to underlying cancer  - Worsening pain yesterday afternoon, but more comfortable this morning during staff rounds and IV dilaudid use has decreased since increasing fentanyl dose several days ago  - Continue 200 mcg fentanyl patch (2 x 100 mcg patches in place currently)  - Escalate dilaudid PCA dosage to 0.6 mg bolus, q20 min lockout   - Given that patient's g-tube unable to be clamped and is currently draining to gravity, patient likely would not benefit from oral medications for breakthrough pain. As such, recommend continuing PCA both now and at discharge (patient has PICC for TPN)   - If PCA not able to be continued at home and hospice not an option for patient, can give Roxanol 60-80 mg (3-4mL) Q2H PRN as this has some buccal absorption   - Consider starting 6 mg IV decadron daily, to help inflammation and edema in patients with terminal bowel obstruction     2. Constipation:   - Recommend brown bomb or similar enema (per pt request, please give this after lunch)   - Dulcolax suppository daily      3. Nausea: Recommend continuing scheduled Zofran 8 mg IV Q8 and PRN phenergan suppositories      4. Goals of Care:   - Inpatient hospice setting would be ideal place for pt to receive the kind of intensive sx management that she will need moving forward.     - If plan is for home, patient may benefit from AIM program to help with symptom management as needs escelate  - Ideally, code status should be addressed prior to d/c.  Palliative care able to discuss if the primary team desires this    Pt seen and examined with staff, Dr. Perez. Palliative care will continue to follow patient. Staff  "attestation to follow. For assistance now until Sunday, please contact Dr. Perez at [phone].     Sisi Albarran MD  IM PGY-2    /74  Pulse (!) 113  Temp 97.7 °F (36.5 °C) (Oral)   Resp 18  Ht 5' 4" (1.626 m)  Wt 40.8 kg (90 lb)  LMP 01/03/2017  SpO2 97%  Breastfeeding? No  BMI 15.45 kg/m2  Gen: appears comfortable during staff rounds  HEENT: WNL  CV/Resp: breathing comfortably, on 4L NC oxygen  Abd: midline abd incision no erythema or drainage  Venting G tube; + BS, firm abdomen   Extremities: bilateral lower extremity pitting edema, left > right  Neuro: AAO x 3, interactive and appropriate    SUBJECTIVE:      Interval History: Increasing abdominal pain, distension, rigidity yesterday afternoon, so primary team ordered an xray, which did not demonstrate pneumoperitoneum. Still no BM or flatus passed. Says she had a bad night yesterday, as she feels her abdominal discomfort is partially controlled by frequently changing her positioning in bed, and her friend who spent the night let her sleep several hours at a time without waking her up to adjust her positioning.      HPI: 52 yo F with PMHx renal cell carcinoma with mets to liver and bone s/p L nephrectomy 01/2017 presents with 8 d constipation with associated nausea/vomiting intermittently for past 7 days. She notes difficulty keeping down any food originally but had been using some Ensure supplements. Had been doing daily PEG 17 g but decided to try a magnesium citrate due to claims to help with abdominal cramping. She had no relief with this. Tried lactulose with increase in abd pain and cramping and a couple limited watery bowel movements after using this. Notes approx 4 lb weight loss over past week or so. She has been on cabozantinib 60 mg po qd since 01/25/17 with no issues with bowel obstruction since starting chemo. Has abdominal procedures in [facility] including recent L nephrectomy, 2 C sections, and tubal ligation.      "

## 2017-04-13 NOTE — PLAN OF CARE
Problem: Fall Risk (Adult)  Goal: Identify Related Risk Factors and Signs and Symptoms  Related risk factors and signs and symptoms are identified upon initiation of Human Response Clinical Practice Guideline (CPG)   Outcome: Ongoing (interventions implemented as appropriate)  Patient in bed resting. Pain is managed by PCA pump. No complaints of nausea and vomiting. Stable vital signs with no signs of distress. Patient ambulates with assistance and no falls noted. Incision clean, dry and in tact. Call light in reach. Will continue to monitor.

## 2017-04-13 NOTE — ASSESSMENT & PLAN NOTE
- will need to follow up with Dr. Connelly in clinic after discharge for further management of cancer   - Ex lap revealed omental caking, biopsy revealed poorly differentiated carcinoma  - goal is to discharge home with home health in next 4-72 hrs.  - likely not a candidate for further chemotherapy; however, patient will follow up with Dr. Connelly after discharge.  - continue palliative care   - she has a fentanyl 200 mcg/hr patch with dilaudid PCA for breakthrough which will be increased to 0.6mg per palliative care

## 2017-04-13 NOTE — ASSESSMENT & PLAN NOTE
- Hb on 4/10 was 11.4, now 8.0  - no acute bleeding noted,   - G tube to gravity and no blood in fluid   - no hematuria   - patient yet to have BM   - will check hemolysis labs

## 2017-04-13 NOTE — PLAN OF CARE
Problem: Patient Care Overview  Goal: Plan of Care Review  Outcome: Ongoing (interventions implemented as appropriate)  Patient in bed resting. Denies any nausea. Complaints of abdominal discomfort. Stable vital signs with no signs of distress. Patient ambulates with assistance and no falls noted. Incision clean, dry and in tact. Call light in reach. Will continue to monitor.

## 2017-04-14 LAB
ALBUMIN SERPL BCP-MCNC: 1.5 G/DL
ALP SERPL-CCNC: 121 U/L
ALT SERPL W/O P-5'-P-CCNC: 10 U/L
ANION GAP SERPL CALC-SCNC: 10 MMOL/L
ANISOCYTOSIS BLD QL SMEAR: SLIGHT
AST SERPL-CCNC: 17 U/L
BASOPHILS # BLD AUTO: ABNORMAL K/UL
BASOPHILS NFR BLD: 0 %
BILIRUB SERPL-MCNC: 5.6 MG/DL
BUN SERPL-MCNC: 39 MG/DL
CALCIUM SERPL-MCNC: 9.2 MG/DL
CHLORIDE SERPL-SCNC: 97 MMOL/L
CO2 SERPL-SCNC: 26 MMOL/L
CREAT SERPL-MCNC: 0.8 MG/DL
DIFFERENTIAL METHOD: ABNORMAL
EOSINOPHIL # BLD AUTO: ABNORMAL K/UL
EOSINOPHIL NFR BLD: 1 %
ERYTHROCYTE [DISTWIDTH] IN BLOOD BY AUTOMATED COUNT: 21.1 %
EST. GFR  (AFRICAN AMERICAN): >60 ML/MIN/1.73 M^2
EST. GFR  (NON AFRICAN AMERICAN): >60 ML/MIN/1.73 M^2
GLUCOSE SERPL-MCNC: 124 MG/DL
HCT VFR BLD AUTO: 29.2 %
HGB BLD-MCNC: 9.5 G/DL
HYPOCHROMIA BLD QL SMEAR: ABNORMAL
LYMPHOCYTES # BLD AUTO: ABNORMAL K/UL
LYMPHOCYTES NFR BLD: 2 %
MAGNESIUM SERPL-MCNC: 2.6 MG/DL
MCH RBC QN AUTO: 26.9 PG
MCHC RBC AUTO-ENTMCNC: 32.5 %
MCV RBC AUTO: 83 FL
METAMYELOCYTES NFR BLD MANUAL: 1 %
MONOCYTES # BLD AUTO: ABNORMAL K/UL
MONOCYTES NFR BLD: 7 %
NEUTROPHILS NFR BLD: 89 %
OVALOCYTES BLD QL SMEAR: ABNORMAL
PHOSPHATE SERPL-MCNC: 4.2 MG/DL
PLATELET # BLD AUTO: 314 K/UL
PLATELET BLD QL SMEAR: ABNORMAL
PMV BLD AUTO: 11.1 FL
POIKILOCYTOSIS BLD QL SMEAR: SLIGHT
POLYCHROMASIA BLD QL SMEAR: ABNORMAL
POTASSIUM SERPL-SCNC: 4.8 MMOL/L
PREALB SERPL-MCNC: 3 MG/DL
PROT SERPL-MCNC: 6.4 G/DL
RBC # BLD AUTO: 3.53 M/UL
SODIUM SERPL-SCNC: 133 MMOL/L
TARGETS BLD QL SMEAR: ABNORMAL
WBC # BLD AUTO: 17.78 K/UL

## 2017-04-14 PROCEDURE — 63600175 PHARM REV CODE 636 W HCPCS: Performed by: INTERNAL MEDICINE

## 2017-04-14 PROCEDURE — 63600175 PHARM REV CODE 636 W HCPCS: Performed by: PHYSICIAN ASSISTANT

## 2017-04-14 PROCEDURE — 11000001 HC ACUTE MED/SURG PRIVATE ROOM

## 2017-04-14 PROCEDURE — 84134 ASSAY OF PREALBUMIN: CPT

## 2017-04-14 PROCEDURE — 94760 N-INVAS EAR/PLS OXIMETRY 1: CPT

## 2017-04-14 PROCEDURE — 85027 COMPLETE CBC AUTOMATED: CPT

## 2017-04-14 PROCEDURE — 84100 ASSAY OF PHOSPHORUS: CPT

## 2017-04-14 PROCEDURE — 36415 COLL VENOUS BLD VENIPUNCTURE: CPT

## 2017-04-14 PROCEDURE — C9113 INJ PANTOPRAZOLE SODIUM, VIA: HCPCS | Performed by: PHYSICIAN ASSISTANT

## 2017-04-14 PROCEDURE — 63600175 PHARM REV CODE 636 W HCPCS: Performed by: STUDENT IN AN ORGANIZED HEALTH CARE EDUCATION/TRAINING PROGRAM

## 2017-04-14 PROCEDURE — 85007 BL SMEAR W/DIFF WBC COUNT: CPT

## 2017-04-14 PROCEDURE — 25000003 PHARM REV CODE 250: Performed by: STUDENT IN AN ORGANIZED HEALTH CARE EDUCATION/TRAINING PROGRAM

## 2017-04-14 PROCEDURE — 99233 SBSQ HOSP IP/OBS HIGH 50: CPT | Mod: ,,, | Performed by: INTERNAL MEDICINE

## 2017-04-14 PROCEDURE — 83735 ASSAY OF MAGNESIUM: CPT

## 2017-04-14 PROCEDURE — 25000003 PHARM REV CODE 250: Performed by: INTERNAL MEDICINE

## 2017-04-14 PROCEDURE — 27000221 HC OXYGEN, UP TO 24 HOURS

## 2017-04-14 PROCEDURE — 80053 COMPREHEN METABOLIC PANEL: CPT

## 2017-04-14 RX ADMIN — FENTANYL 2 PATCH: 100 PATCH, EXTENDED RELEASE TRANSDERMAL at 12:04

## 2017-04-14 RX ADMIN — ASCORBIC ACID, VITAMIN A PALMITATE, CHOLECALCIFEROL, THIAMINE HYDROCHLORIDE, RIBOFLAVIN-5 PHOSPHATE SODIUM, PYRIDOXINE HYDROCHLORIDE, NIACINAMIDE, DEXPANTHENOL, ALPHA-TOCOPHEROL ACETATE, VITAMIN K1, FOLIC ACID, BIOTIN, CYANOCOBALAMIN: 200; 3300; 200; 6; 3.6; 6; 40; 15; 10; 150; 600; 60; 5 INJECTION, SOLUTION INTRAVENOUS at 11:04

## 2017-04-14 RX ADMIN — Medication: at 07:04

## 2017-04-14 RX ADMIN — Medication: at 10:04

## 2017-04-14 RX ADMIN — Medication 10 ML: at 06:04

## 2017-04-14 RX ADMIN — Medication: at 09:04

## 2017-04-14 RX ADMIN — ONDANSETRON 8 MG: 2 INJECTION INTRAMUSCULAR; INTRAVENOUS at 09:04

## 2017-04-14 RX ADMIN — Medication: at 02:04

## 2017-04-14 RX ADMIN — Medication 3 ML: at 02:04

## 2017-04-14 RX ADMIN — Medication 3 ML: at 06:04

## 2017-04-14 RX ADMIN — ONDANSETRON 8 MG: 2 INJECTION INTRAMUSCULAR; INTRAVENOUS at 05:04

## 2017-04-14 RX ADMIN — PANTOPRAZOLE SODIUM 40 MG: 40 INJECTION, POWDER, FOR SOLUTION INTRAVENOUS at 09:04

## 2017-04-14 RX ADMIN — ENOXAPARIN SODIUM 40 MG: 100 INJECTION SUBCUTANEOUS at 05:04

## 2017-04-14 RX ADMIN — Medication 10 ML: at 11:04

## 2017-04-14 RX ADMIN — I.V. FAT EMULSION 250 ML: 20 EMULSION INTRAVENOUS at 09:04

## 2017-04-14 RX ADMIN — ONDANSETRON 8 MG: 2 INJECTION INTRAMUSCULAR; INTRAVENOUS at 12:04

## 2017-04-14 RX ADMIN — Medication 10 ML: at 12:04

## 2017-04-14 RX ADMIN — Medication 3 ML: at 09:04

## 2017-04-14 NOTE — ASSESSMENT & PLAN NOTE
- Hb on 4/10 was 11.4, decreased to 8.0 yesterday and today is 9.5  - no acute bleeding noted   - G tube to gravity and no blood in fluid   - no hematuria   - patient yet to have BM   - patient does not appear to be hemolyzing

## 2017-04-14 NOTE — SUBJECTIVE & OBJECTIVE
Interval History:   Patient still requiring 24mg dilaudid overall for the last 24 hours with PCA pump, despite 200mcg fentanyl patch. Tried fleet enema but no BM. Discussed with patient today, that she would need TPN and PCA pump to go home on and the only possible option would hospice. Will get SW to help with this.     Oncology Treatment Plan:   [No treatment plan]    Medications:  Continuous Infusions:   Amino acid 5% - dextrose 20% (CLINIMIX-E) solution with additives (1L provides 50 gm AA, 200 gm CHO (680 kcal/L dextrose), Na 35, K 30, Mg 5, Ca 4.5, Acetate 80, Cl 39, Phos 15) 40 mL/hr at 04/14/17 1101    hydromorphone in 0.9 % NaCl 6 mg/30 ml       Scheduled Meds:   enoxaparin  40 mg Subcutaneous Q24H    fat emulsion 20%  250 mL Intravenous Daily    fentaNYL  2 patch Transdermal Q72H    ondansetron  8 mg Intravenous Q8H    pantoprazole  40 mg Intravenous Daily    sodium chloride 0.9%  10 mL Intravenous Q6H    sodium chloride 0.9%  3 mL Intravenous Q8H    sodium phosphates  1 enema Rectal Once     PRN Meds:naloxone, promethazine, Flushing PICC Protocol **AND** sodium chloride 0.9% **AND** sodium chloride 0.9%     Review of Systems   Constitutional: Negative for chills and fever.   HENT: Negative for congestion.    Respiratory: Negative for cough and shortness of breath.    Cardiovascular: Negative for chest pain, palpitations and leg swelling.   Gastrointestinal: Positive for abdominal pain, constipation and nausea. Negative for abdominal distention and blood in stool.   Genitourinary: Negative for dysuria and hematuria.   Skin: Negative for rash and wound.     Objective:     Vital Signs (Most Recent):  Temp: 97.4 °F (36.3 °C) (04/14/17 1145)  Pulse: (!) 120 (04/14/17 1145)  Resp: 18 (04/14/17 1145)  BP: 118/76 (04/14/17 1145)  SpO2: 95 % (04/14/17 1145) Vital Signs (24h Range):  Temp:  [96.3 °F (35.7 °C)-97.8 °F (36.6 °C)] 97.4 °F (36.3 °C)  Pulse:  [] 120  Resp:  [18-20] 18  SpO2:  [95 %-98 %]  95 %  BP: (113-125)/(72-79) 118/76     Weight: 40.8 kg (90 lb)  Body mass index is 15.45 kg/(m^2).  Body surface area is 1.36 meters squared.      Intake/Output Summary (Last 24 hours) at 04/14/17 1237  Last data filed at 04/14/17 0600   Gross per 24 hour   Intake              270 ml   Output             1300 ml   Net            -1030 ml       Physical Exam   Constitutional: She is oriented to person, place, and time. She appears well-developed.   Fatigued, malnourished   HENT:   Head: Normocephalic.   Eyes: Pupils are equal, round, and reactive to light.   Neck: Neck supple. No JVD present.   Cardiovascular: Normal rate, regular rhythm and normal heart sounds.    No murmur heard.  Pulmonary/Chest: Effort normal. No respiratory distress. She has decreased breath sounds. She has no wheezes. She has no rhonchi.   Abdominal:   middle vertical abdominal incision with staples. Venting gastrostomy tube noted in left abdominal quadrant. Hypoactive bowel sounds.   Musculoskeletal: She exhibits no edema.   Neurological: She is oriented to person, place, and time.   Skin: Skin is warm.   Psychiatric: She has a normal mood and affect.     Significant Labs:   Labs have been reviewed.      Recent Labs  Lab 04/14/17  0737   WBC 17.78*   RBC 3.53*   HGB 9.5*   HCT 29.2*      MCV 83   MCH 26.9*   MCHC 32.5       Recent Labs  Lab 04/14/17  0737   CALCIUM 9.2   PROT 6.4   *   K 4.8   CO2 26   CL 97   BUN 39*   CREATININE 0.8   ALKPHOS 121   ALT 10   AST 17   BILITOT 5.6*     Imaging: AXR: no obstruction noted, but residual contrast in large bowel

## 2017-04-14 NOTE — ASSESSMENT & PLAN NOTE
- will need to follow up with Dr. Connelly in clinic   - Ex lap revealed omental caking, biopsy revealed poorly differentiated carcinoma  - likely not a candidate for further chemotherapy; however, patient will follow up with Dr. Connelly after discharge.  - continue palliative care   - she has a fentanyl 200 mcg/hr patch with dilaudid PCA for breakthrough which will be increased to 0.6mg per palliative care but patient is in a lot fo pain when she wakes up overnight

## 2017-04-14 NOTE — ASSESSMENT & PLAN NOTE
- adhesions and ascites seen during Ex Lap with thickening of peritoneum and omental caking.   - POD 5, still without flatus/BM   - continue NPO except water and ice chips  - biopsy shows poorly differentiated carcinoma  - patient has venting gastrostomy tube to gravity  - continue pain control per palliative care. We will ask our  to see if there is a Hospice agency that will accommodate a PCA pump.  - continue total parenteral nutrition.

## 2017-04-14 NOTE — PLAN OF CARE
Problem: Fall Risk (Adult)  Goal: Identify Related Risk Factors and Signs and Symptoms  Related risk factors and signs and symptoms are identified upon initiation of Human Response Clinical Practice Guideline (CPG)   Outcome: Ongoing (interventions implemented as appropriate)  Patient in chair resting. Complaints of pain are handled with PCA administration. No complaints of nausea and vomiting. Stable vital signs with no signs of distress. Patient ambulates  With assistance, no falls noted. Incision clean, dry and in tact. Call light in reach. Will continue to monitor.

## 2017-04-14 NOTE — PROGRESS NOTES
[facility]  Hematology/Oncology  Progress Note    Patient Name: Nikia Lamas  Admission Date: 3/31/2017  Hospital Length of Stay: 14 days  Code Status: Full Code     Subjective:       Interval History:   Patient still requiring 24mg dilaudid overall for the last 24 hours with PCA pump, despite 200mcg fentanyl patch. Tried fleet enema but no BM. Discussed with patient today, that she would need TPN and PCA pump to go home on and the only possible option would hospice. Will get SW to help with this.     Oncology Treatment Plan:   [No treatment plan]    Medications:  Continuous Infusions:   Amino acid 5% - dextrose 20% (CLINIMIX-E) solution with additives (1L provides 50 gm AA, 200 gm CHO (680 kcal/L dextrose), Na 35, K 30, Mg 5, Ca 4.5, Acetate 80, Cl 39, Phos 15) 40 mL/hr at 04/14/17 1101    hydromorphone in 0.9 % NaCl 6 mg/30 ml       Scheduled Meds:   enoxaparin  40 mg Subcutaneous Q24H    fat emulsion 20%  250 mL Intravenous Daily    fentaNYL  2 patch Transdermal Q72H    ondansetron  8 mg Intravenous Q8H    pantoprazole  40 mg Intravenous Daily    sodium chloride 0.9%  10 mL Intravenous Q6H    sodium chloride 0.9%  3 mL Intravenous Q8H    sodium phosphates  1 enema Rectal Once     PRN Meds:naloxone, promethazine, Flushing PICC Protocol **AND** sodium chloride 0.9% **AND** sodium chloride 0.9%     Review of Systems   Constitutional: Negative for chills and fever.   HENT: Negative for congestion.    Respiratory: Negative for cough and shortness of breath.    Cardiovascular: Negative for chest pain, palpitations and leg swelling.   Gastrointestinal: Positive for abdominal pain, constipation and nausea. Negative for abdominal distention and blood in stool.   Genitourinary: Negative for dysuria and hematuria.   Skin: Negative for rash and wound.     Objective:     Vital Signs (Most Recent):  Temp: 97.4 °F (36.3 °C) (04/14/17 1145)  Pulse: (!) 120 (04/14/17 1145)  Resp: 18 (04/14/17  1145)  BP: 118/76 (04/14/17 1145)  SpO2: 95 % (04/14/17 1145) Vital Signs (24h Range):  Temp:  [96.3 °F (35.7 °C)-97.8 °F (36.6 °C)] 97.4 °F (36.3 °C)  Pulse:  [] 120  Resp:  [18-20] 18  SpO2:  [95 %-98 %] 95 %  BP: (113-125)/(72-79) 118/76     Weight: 40.8 kg (90 lb)  Body mass index is 15.45 kg/(m^2).  Body surface area is 1.36 meters squared.      Intake/Output Summary (Last 24 hours) at 04/14/17 1237  Last data filed at 04/14/17 0600   Gross per 24 hour   Intake              270 ml   Output             1300 ml   Net            -1030 ml       Physical Exam   Constitutional: She is oriented to person, place, and time. She appears well-developed.   Fatigued, malnourished   HENT:   Head: Normocephalic.   Eyes: Pupils are equal, round, and reactive to light.   Neck: Neck supple. No JVD present.   Cardiovascular: Normal rate, regular rhythm and normal heart sounds.    No murmur heard.  Pulmonary/Chest: Effort normal. No respiratory distress. She has decreased breath sounds. She has no wheezes. She has no rhonchi.   Abdominal:   middle vertical abdominal incision with staples. Venting gastrostomy tube noted in left abdominal quadrant. Hypoactive bowel sounds.   Musculoskeletal: She exhibits no edema.   Neurological: She is oriented to person, place, and time.   Skin: Skin is warm.   Psychiatric: She has a normal mood and affect.     Significant Labs:   Labs have been reviewed.      Recent Labs  Lab 04/14/17  0737   WBC 17.78*   RBC 3.53*   HGB 9.5*   HCT 29.2*      MCV 83   MCH 26.9*   MCHC 32.5       Recent Labs  Lab 04/14/17  0737   CALCIUM 9.2   PROT 6.4   *   K 4.8   CO2 26   CL 97   BUN 39*   CREATININE 0.8   ALKPHOS 121   ALT 10   AST 17   BILITOT 5.6*     Imaging: AXR: no obstruction noted, but residual contrast in large bowel    Assessment/Plan:     * Small bowel obstruction  - adhesions and ascites seen during Ex Lap with thickening of peritoneum and omental caking.   - POD 5, still without  flatus/BM   - continue NPO except water and ice chips  - biopsy shows poorly differentiated carcinoma  - patient has venting gastrostomy tube to gravity  - continue pain control per palliative care. We will ask our  to see if there is a Hospice agency that will accommodate a PCA pump.  - continue total parenteral nutrition.    Metastatic renal cell carcinoma  - will need to follow up with Dr. Connelly in clinic   - Ex lap revealed omental caking, biopsy revealed poorly differentiated carcinoma  - likely not a candidate for further chemotherapy; however, patient will follow up with Dr. Connelly after discharge.  - continue palliative care   - she has a fentanyl 200 mcg/hr patch with dilaudid PCA for breakthrough which will be increased to 0.6mg per palliative care but patient is in a lot fo pain when she wakes up overnight      Acute respiratory failure with hypoxia  - secondary to pulmonary metastases, volume overload/pulmonary edema, and inability to take deep inspirations due to abdominal pain  - continue supplemental oxygen, wean off for SpO2 goal of >90%   - requiring 2-4L NC  - incentive spirometry and PT/OT for atelectasis    Protein-calorie malnutrition, severe  - secondary to advanced cancer, bowel obstruction, and peritoneal carcinomatosis with omental caking  - venting gastrostomy tube has been placed  - continue total parenteral nutrition per surgery  - hyperbilirubinemia secondary to TPN. Continue to monitor.    Anemia  - Hb on 4/10 was 11.4, decreased to 8.0 yesterday and today is 9.5  - no acute bleeding noted   - G tube to gravity and no blood in fluid   - no hematuria   - patient yet to have BM   - patient does not appear to be hemolyzing            Tricia Charles MD  Hematology/Oncology  [facility]      STAFF NOTE:  I have personally taken the history and examined this patient and agree with residents Note as stated above

## 2017-04-14 NOTE — PT/OT/SLP PROGRESS
"Physical Therapy      Nikiadra SLY Lamas  MRN: [id number]    Patient not seen today secondary to patient states "I'm about to walk with my family. I rather walk with them because it's our bonding time." Will follow-up according to PT POC.    Supriya Ross, PT   4/14/2017      "

## 2017-04-15 LAB
ALBUMIN SERPL BCP-MCNC: 1.4 G/DL
ALP SERPL-CCNC: 118 U/L
ALT SERPL W/O P-5'-P-CCNC: 12 U/L
ANION GAP SERPL CALC-SCNC: 11 MMOL/L
AST SERPL-CCNC: 18 U/L
BASOPHILS # BLD AUTO: 0.04 K/UL
BASOPHILS NFR BLD: 0.2 %
BILIRUB DIRECT SERPL-MCNC: 4.2 MG/DL
BILIRUB SERPL-MCNC: 5.7 MG/DL
BUN SERPL-MCNC: 46 MG/DL
CALCIUM SERPL-MCNC: 8.7 MG/DL
CHLORIDE SERPL-SCNC: 97 MMOL/L
CO2 SERPL-SCNC: 25 MMOL/L
CREAT SERPL-MCNC: 1 MG/DL
DIFFERENTIAL METHOD: ABNORMAL
EOSINOPHIL # BLD AUTO: 0.3 K/UL
EOSINOPHIL NFR BLD: 2.1 %
ERYTHROCYTE [DISTWIDTH] IN BLOOD BY AUTOMATED COUNT: 21.1 %
EST. GFR  (AFRICAN AMERICAN): >60 ML/MIN/1.73 M^2
EST. GFR  (NON AFRICAN AMERICAN): >60 ML/MIN/1.73 M^2
GGT SERPL-CCNC: 94 U/L
GLUCOSE SERPL-MCNC: 109 MG/DL
HCT VFR BLD AUTO: 27.1 %
HGB BLD-MCNC: 8.9 G/DL
LYMPHOCYTES # BLD AUTO: 0.7 K/UL
LYMPHOCYTES NFR BLD: 4.5 %
MAGNESIUM SERPL-MCNC: 2.7 MG/DL
MCH RBC QN AUTO: 26.8 PG
MCHC RBC AUTO-ENTMCNC: 32.8 %
MCV RBC AUTO: 82 FL
MONOCYTES # BLD AUTO: 2 K/UL
MONOCYTES NFR BLD: 12.1 %
NEUTROPHILS # BLD AUTO: 13 K/UL
NEUTROPHILS NFR BLD: 81.1 %
PHOSPHATE SERPL-MCNC: 4.9 MG/DL
PLATELET # BLD AUTO: 339 K/UL
PMV BLD AUTO: 10.9 FL
POTASSIUM SERPL-SCNC: 4.8 MMOL/L
PROT SERPL-MCNC: 6.1 G/DL
RBC # BLD AUTO: 3.32 M/UL
SODIUM SERPL-SCNC: 133 MMOL/L
WBC # BLD AUTO: 16.09 K/UL

## 2017-04-15 PROCEDURE — 36415 COLL VENOUS BLD VENIPUNCTURE: CPT

## 2017-04-15 PROCEDURE — 82248 BILIRUBIN DIRECT: CPT

## 2017-04-15 PROCEDURE — 25000003 PHARM REV CODE 250: Performed by: STUDENT IN AN ORGANIZED HEALTH CARE EDUCATION/TRAINING PROGRAM

## 2017-04-15 PROCEDURE — C9113 INJ PANTOPRAZOLE SODIUM, VIA: HCPCS | Performed by: PHYSICIAN ASSISTANT

## 2017-04-15 PROCEDURE — 99233 SBSQ HOSP IP/OBS HIGH 50: CPT | Mod: ,,, | Performed by: INTERNAL MEDICINE

## 2017-04-15 PROCEDURE — 63600175 PHARM REV CODE 636 W HCPCS: Performed by: STUDENT IN AN ORGANIZED HEALTH CARE EDUCATION/TRAINING PROGRAM

## 2017-04-15 PROCEDURE — 82977 ASSAY OF GGT: CPT

## 2017-04-15 PROCEDURE — 80053 COMPREHEN METABOLIC PANEL: CPT

## 2017-04-15 PROCEDURE — 25000003 PHARM REV CODE 250: Performed by: INTERNAL MEDICINE

## 2017-04-15 PROCEDURE — 83735 ASSAY OF MAGNESIUM: CPT

## 2017-04-15 PROCEDURE — 63600175 PHARM REV CODE 636 W HCPCS: Performed by: INTERNAL MEDICINE

## 2017-04-15 PROCEDURE — 84100 ASSAY OF PHOSPHORUS: CPT

## 2017-04-15 PROCEDURE — 87040 BLOOD CULTURE FOR BACTERIA: CPT

## 2017-04-15 PROCEDURE — 11000001 HC ACUTE MED/SURG PRIVATE ROOM

## 2017-04-15 PROCEDURE — 85025 COMPLETE CBC W/AUTO DIFF WBC: CPT

## 2017-04-15 PROCEDURE — 63600175 PHARM REV CODE 636 W HCPCS: Performed by: PHYSICIAN ASSISTANT

## 2017-04-15 RX ADMIN — Medication 3 ML: at 05:04

## 2017-04-15 RX ADMIN — ASCORBIC ACID, VITAMIN A PALMITATE, CHOLECALCIFEROL, THIAMINE HYDROCHLORIDE, RIBOFLAVIN-5 PHOSPHATE SODIUM, PYRIDOXINE HYDROCHLORIDE, NIACINAMIDE, DEXPANTHENOL, ALPHA-TOCOPHEROL ACETATE, VITAMIN K1, FOLIC ACID, BIOTIN, CYANOCOBALAMIN: 200; 3300; 200; 6; 3.6; 6; 40; 15; 10; 150; 600; 60; 5 INJECTION, SOLUTION INTRAVENOUS at 05:04

## 2017-04-15 RX ADMIN — Medication: at 09:04

## 2017-04-15 RX ADMIN — Medication 3 ML: at 02:04

## 2017-04-15 RX ADMIN — ENOXAPARIN SODIUM 40 MG: 100 INJECTION SUBCUTANEOUS at 05:04

## 2017-04-15 RX ADMIN — Medication: at 06:04

## 2017-04-15 RX ADMIN — Medication 10 ML: at 05:04

## 2017-04-15 RX ADMIN — Medication: at 01:04

## 2017-04-15 RX ADMIN — Medication: at 02:04

## 2017-04-15 RX ADMIN — ONDANSETRON 8 MG: 2 INJECTION INTRAMUSCULAR; INTRAVENOUS at 12:04

## 2017-04-15 RX ADMIN — Medication: at 11:04

## 2017-04-15 RX ADMIN — Medication 3 ML: at 10:04

## 2017-04-15 RX ADMIN — ONDANSETRON 8 MG: 2 INJECTION INTRAMUSCULAR; INTRAVENOUS at 04:04

## 2017-04-15 RX ADMIN — Medication 10 ML: at 12:04

## 2017-04-15 RX ADMIN — ONDANSETRON 8 MG: 2 INJECTION INTRAMUSCULAR; INTRAVENOUS at 09:04

## 2017-04-15 RX ADMIN — PANTOPRAZOLE SODIUM 40 MG: 40 INJECTION, POWDER, FOR SOLUTION INTRAVENOUS at 09:04

## 2017-04-15 NOTE — SUBJECTIVE & OBJECTIVE
Interval History:   No new issues. Will try to get hospice for TPN and PCA pump to go home. Will get SW to help with this.     Oncology Treatment Plan:   [No treatment plan]    Medications:  Continuous Infusions:   hydromorphone in 0.9 % NaCl 6 mg/30 ml       Scheduled Meds:   enoxaparin  40 mg Subcutaneous Q24H    fentaNYL  2 patch Transdermal Q72H    ondansetron  8 mg Intravenous Q8H    pantoprazole  40 mg Intravenous Daily    sodium chloride 0.9%  10 mL Intravenous Q6H    sodium chloride 0.9%  3 mL Intravenous Q8H    sodium phosphates  1 enema Rectal Once     PRN Meds:naloxone, promethazine, Flushing PICC Protocol **AND** sodium chloride 0.9% **AND** sodium chloride 0.9%     Review of Systems   Constitutional: Negative for chills and fever.   HENT: Negative for congestion.    Respiratory: Negative for cough and shortness of breath.    Cardiovascular: Negative for chest pain, palpitations and leg swelling.   Gastrointestinal: Positive for abdominal pain, constipation and nausea. Negative for abdominal distention and blood in stool.   Genitourinary: Negative for dysuria and hematuria.   Skin: Negative for rash and wound.     Objective:     Vital Signs (Most Recent):  Temp: 97.1 °F (36.2 °C) (04/15/17 0800)  Pulse: 104 (04/15/17 0800)  Resp: 16 (04/15/17 0800)  BP: 105/71 (04/15/17 0800)  SpO2: (!) 94 % (04/15/17 0800) Vital Signs (24h Range):  Temp:  [97.1 °F (36.2 °C)-99 °F (37.2 °C)] 97.1 °F (36.2 °C)  Pulse:  [104-130] 104  Resp:  [16-18] 16  SpO2:  [93 %-96 %] 94 %  BP: (105-123)/(69-82) 105/71     Weight: 40.8 kg (90 lb)  Body mass index is 15.45 kg/(m^2).  Body surface area is 1.36 meters squared.      Intake/Output Summary (Last 24 hours) at 04/15/17 1135  Last data filed at 04/15/17 0953   Gross per 24 hour   Intake            723.2 ml   Output             1050 ml   Net           -326.8 ml       Physical Exam   Constitutional: She is oriented to person, place, and time. She appears well-developed.    Fatigued, malnourished   HENT:   Head: Normocephalic.   Eyes: Pupils are equal, round, and reactive to light.   Neck: Neck supple. No JVD present.   Cardiovascular: Normal rate, regular rhythm and normal heart sounds.    No murmur heard.  Pulmonary/Chest: Effort normal. No respiratory distress. She has decreased breath sounds. She has no wheezes. She has no rhonchi.   Abdominal:   middle vertical abdominal incision with staples. Venting gastrostomy tube noted in left abdominal quadrant. Hypoactive bowel sounds.   Musculoskeletal: She exhibits edema (pitting bilateral legs and adominal wall).   Neurological: She is oriented to person, place, and time.   Skin: Skin is warm.   Psychiatric: She has a normal mood and affect.     Significant Labs:   Labs have been reviewed.      Recent Labs  Lab 04/15/17  0510   WBC 16.09*   RBC 3.32*   HGB 8.9*   HCT 27.1*      MCV 82   MCH 26.8*   MCHC 32.8       Recent Labs  Lab 04/15/17  0510   CALCIUM 8.7   PROT 6.1   *   K 4.8   CO2 25   CL 97   BUN 46*   CREATININE 1.0   ALKPHOS 118   ALT 12   AST 18   BILITOT 5.7*     Imaging: AXR: no obstruction noted, but residual contrast in large bowel

## 2017-04-15 NOTE — PLAN OF CARE
Problem: Patient Care Overview  Goal: Plan of Care Review  Outcome: Ongoing (interventions implemented as appropriate)  No acute events throughout night, VS and assessment per flow sheet, patient progressing towards goals as tolerated, plan of care reviewed with Nikia Lamas and family, all concerns addressed, will continue to monitor.

## 2017-04-15 NOTE — PLAN OF CARE
Problem: Patient Care Overview  Goal: Plan of Care Review  Outcome: Ongoing (interventions implemented as appropriate)  Pt is progressing with plan of care. Frequent rounds made to assess pain and safety. Pt's pain controlled with pain medication ordered at this time. G tube flushed Q4 hours with 20 mls water draining green liquid. Pt encouraged to ambulate. Bed locked and at lowest position. Side rails up x 2. Call light within reach. Will continue to monitor.

## 2017-04-15 NOTE — ASSESSMENT & PLAN NOTE
- will need to follow up with Dr. Connelly in clinic   - Ex lap revealed omental caking, biopsy revealed poorly differentiated carcinoma  - likely not a candidate for further chemotherapy; however, patient will follow up with Dr. Connelly after discharge.  - continue palliative care   - she has a fentanyl 200 mcg/hr patch with dilaudid PCA for breakthrough which will be increased to 0.6mg per palliative care but patient is in a lot fo pain when she wakes up overnight  - will get hospice involved for discharge planning with TPN and PCA/other pain control

## 2017-04-15 NOTE — PROGRESS NOTES
[facility]  Hematology/Oncology  Progress Note    Patient Name: Nikia Lamas  Admission Date: 3/31/2017  Hospital Length of Stay: 15 days  Code Status: Full Code     Subjective:       Interval History:   No new issues. Will try to get hospice for TPN and PCA pump to go home. Will get SW to help with this.     Oncology Treatment Plan:   [No treatment plan]    Medications:  Continuous Infusions:   hydromorphone in 0.9 % NaCl 6 mg/30 ml       Scheduled Meds:   enoxaparin  40 mg Subcutaneous Q24H    fentaNYL  2 patch Transdermal Q72H    ondansetron  8 mg Intravenous Q8H    pantoprazole  40 mg Intravenous Daily    sodium chloride 0.9%  10 mL Intravenous Q6H    sodium chloride 0.9%  3 mL Intravenous Q8H    sodium phosphates  1 enema Rectal Once     PRN Meds:naloxone, promethazine, Flushing PICC Protocol **AND** sodium chloride 0.9% **AND** sodium chloride 0.9%     Review of Systems   Constitutional: Negative for chills and fever.   HENT: Negative for congestion.    Respiratory: Negative for cough and shortness of breath.    Cardiovascular: Negative for chest pain, palpitations and leg swelling.   Gastrointestinal: Positive for abdominal pain, constipation and nausea. Negative for abdominal distention and blood in stool.   Genitourinary: Negative for dysuria and hematuria.   Skin: Negative for rash and wound.     Objective:     Vital Signs (Most Recent):  Temp: 97.1 °F (36.2 °C) (04/15/17 0800)  Pulse: 104 (04/15/17 0800)  Resp: 16 (04/15/17 0800)  BP: 105/71 (04/15/17 0800)  SpO2: (!) 94 % (04/15/17 0800) Vital Signs (24h Range):  Temp:  [97.1 °F (36.2 °C)-99 °F (37.2 °C)] 97.1 °F (36.2 °C)  Pulse:  [104-130] 104  Resp:  [16-18] 16  SpO2:  [93 %-96 %] 94 %  BP: (105-123)/(69-82) 105/71     Weight: 40.8 kg (90 lb)  Body mass index is 15.45 kg/(m^2).  Body surface area is 1.36 meters squared.      Intake/Output Summary (Last 24 hours) at 04/15/17 8477  Last data filed at 04/15/17 1360    Gross per 24 hour   Intake            723.2 ml   Output             1050 ml   Net           -326.8 ml       Physical Exam   Constitutional: She is oriented to person, place, and time. She appears well-developed.   Fatigued, malnourished   HENT:   Head: Normocephalic.   Eyes: Pupils are equal, round, and reactive to light.   Neck: Neck supple. No JVD present.   Cardiovascular: Normal rate, regular rhythm and normal heart sounds.    No murmur heard.  Pulmonary/Chest: Effort normal. No respiratory distress. She has decreased breath sounds. She has no wheezes. She has no rhonchi.   Abdominal:   middle vertical abdominal incision with staples. Venting gastrostomy tube noted in left abdominal quadrant. Hypoactive bowel sounds.   Musculoskeletal: She exhibits edema (pitting bilateral legs and adominal wall).   Neurological: She is oriented to person, place, and time.   Skin: Skin is warm.   Psychiatric: She has a normal mood and affect.     Significant Labs:   Labs have been reviewed.      Recent Labs  Lab 04/15/17  0510   WBC 16.09*   RBC 3.32*   HGB 8.9*   HCT 27.1*      MCV 82   MCH 26.8*   MCHC 32.8       Recent Labs  Lab 04/15/17  0510   CALCIUM 8.7   PROT 6.1   *   K 4.8   CO2 25   CL 97   BUN 46*   CREATININE 1.0   ALKPHOS 118   ALT 12   AST 18   BILITOT 5.7*     Imaging: AXR: no obstruction noted, but residual contrast in large bowel    Assessment/Plan:     * Small bowel obstruction  - adhesions and ascites seen during Ex Lap with thickening of peritoneum and omental caking.   - POD 5, still without flatus/BM   - continue NPO except water and ice chips  - biopsy shows poorly differentiated carcinoma  - patient has venting gastrostomy tube to gravity  - continue pain control per palliative care. We will ask our  to see if there is a Hospice agency that will accommodate a PCA pump.  - continue total parenteral nutrition.    Metastatic renal cell carcinoma  - will need to follow up with   Godwin in clinic   - Ex lap revealed omental caking, biopsy revealed poorly differentiated carcinoma  - likely not a candidate for further chemotherapy; however, patient will follow up with Dr. Connelly after discharge.  - continue palliative care   - she has a fentanyl 200 mcg/hr patch with dilaudid PCA for breakthrough which will be increased to 0.6mg per palliative care but patient is in a lot fo pain when she wakes up overnight  - will get hospice involved for discharge planning with TPN and PCA/other pain control      Acute respiratory failure with hypoxia  - secondary to pulmonary metastases, volume overload/pulmonary edema, and inability to take deep inspirations due to abdominal pain  - continue supplemental oxygen, wean off for SpO2 goal of >90%   - requiring 2-4L NC  - incentive spirometry and PT/OT for atelectasis    Protein-calorie malnutrition, severe  - secondary to advanced cancer, bowel obstruction, and peritoneal carcinomatosis with omental caking  - venting gastrostomy tube has been placed  - continue total parenteral nutrition per surgery  - hyperbilirubinemia secondary to TPN. Continue to monitor.    Anemia  - Hb stable  - no acute bleeding noted   - G tube to gravity and no blood in fluid   - no hematuria   - patient yet to have BM   - patient does not appear to be hemolyzing       Dispo: plan for home with hospice with TPN and PCA/other pain control      Tricia Charles MD  Hematology/Oncology  [facility]    STAFF NOTE:  I have personally taken the history and examined this patient and agree with residents Note as stated above

## 2017-04-15 NOTE — ASSESSMENT & PLAN NOTE
- Hb stable  - no acute bleeding noted   - G tube to gravity and no blood in fluid   - no hematuria   - patient yet to have BM   - patient does not appear to be hemolyzing

## 2017-04-16 LAB
ALBUMIN SERPL BCP-MCNC: 1.3 G/DL
ALP SERPL-CCNC: 123 U/L
ALT SERPL W/O P-5'-P-CCNC: 14 U/L
ANION GAP SERPL CALC-SCNC: 11 MMOL/L
AST SERPL-CCNC: 20 U/L
BASOPHILS # BLD AUTO: 0.02 K/UL
BASOPHILS NFR BLD: 0.1 %
BILIRUB SERPL-MCNC: 7.3 MG/DL
BUN SERPL-MCNC: 57 MG/DL
CALCIUM SERPL-MCNC: 8.9 MG/DL
CHLORIDE SERPL-SCNC: 96 MMOL/L
CO2 SERPL-SCNC: 23 MMOL/L
CREAT SERPL-MCNC: 1.1 MG/DL
DIFFERENTIAL METHOD: ABNORMAL
EOSINOPHIL # BLD AUTO: 0.2 K/UL
EOSINOPHIL NFR BLD: 1.7 %
ERYTHROCYTE [DISTWIDTH] IN BLOOD BY AUTOMATED COUNT: 20.9 %
EST. GFR  (AFRICAN AMERICAN): >60 ML/MIN/1.73 M^2
EST. GFR  (NON AFRICAN AMERICAN): 58.7 ML/MIN/1.73 M^2
GLUCOSE SERPL-MCNC: 111 MG/DL
HCT VFR BLD AUTO: 27.6 %
HGB BLD-MCNC: 9.1 G/DL
LYMPHOCYTES # BLD AUTO: 0.7 K/UL
LYMPHOCYTES NFR BLD: 4.7 %
MAGNESIUM SERPL-MCNC: 2.7 MG/DL
MCH RBC QN AUTO: 27.3 PG
MCHC RBC AUTO-ENTMCNC: 33 %
MCV RBC AUTO: 83 FL
MONOCYTES # BLD AUTO: 1.5 K/UL
MONOCYTES NFR BLD: 11.2 %
NEUTROPHILS # BLD AUTO: 11.2 K/UL
NEUTROPHILS NFR BLD: 82.3 %
PHOSPHATE SERPL-MCNC: 5.5 MG/DL
PLATELET # BLD AUTO: 373 K/UL
PMV BLD AUTO: 11.4 FL
POTASSIUM SERPL-SCNC: 5 MMOL/L
PROT SERPL-MCNC: 6.1 G/DL
RBC # BLD AUTO: 3.33 M/UL
SODIUM SERPL-SCNC: 130 MMOL/L
WBC # BLD AUTO: 13.72 K/UL

## 2017-04-16 PROCEDURE — 83735 ASSAY OF MAGNESIUM: CPT

## 2017-04-16 PROCEDURE — 25000003 PHARM REV CODE 250: Performed by: STUDENT IN AN ORGANIZED HEALTH CARE EDUCATION/TRAINING PROGRAM

## 2017-04-16 PROCEDURE — 36415 COLL VENOUS BLD VENIPUNCTURE: CPT

## 2017-04-16 PROCEDURE — 63600175 PHARM REV CODE 636 W HCPCS: Performed by: PHYSICIAN ASSISTANT

## 2017-04-16 PROCEDURE — 25000003 PHARM REV CODE 250: Performed by: INTERNAL MEDICINE

## 2017-04-16 PROCEDURE — 63600175 PHARM REV CODE 636 W HCPCS: Performed by: STUDENT IN AN ORGANIZED HEALTH CARE EDUCATION/TRAINING PROGRAM

## 2017-04-16 PROCEDURE — C9113 INJ PANTOPRAZOLE SODIUM, VIA: HCPCS | Performed by: PHYSICIAN ASSISTANT

## 2017-04-16 PROCEDURE — 80053 COMPREHEN METABOLIC PANEL: CPT

## 2017-04-16 PROCEDURE — 11000001 HC ACUTE MED/SURG PRIVATE ROOM

## 2017-04-16 PROCEDURE — 63600175 PHARM REV CODE 636 W HCPCS: Performed by: INTERNAL MEDICINE

## 2017-04-16 PROCEDURE — 84100 ASSAY OF PHOSPHORUS: CPT

## 2017-04-16 PROCEDURE — 99233 SBSQ HOSP IP/OBS HIGH 50: CPT | Mod: ,,, | Performed by: INTERNAL MEDICINE

## 2017-04-16 PROCEDURE — 85025 COMPLETE CBC W/AUTO DIFF WBC: CPT

## 2017-04-16 RX ADMIN — Medication: at 03:04

## 2017-04-16 RX ADMIN — ONDANSETRON 8 MG: 2 INJECTION INTRAMUSCULAR; INTRAVENOUS at 08:04

## 2017-04-16 RX ADMIN — Medication 3 ML: at 06:04

## 2017-04-16 RX ADMIN — Medication 3 ML: at 01:04

## 2017-04-16 RX ADMIN — Medication: at 07:04

## 2017-04-16 RX ADMIN — Medication 10 ML: at 05:04

## 2017-04-16 RX ADMIN — ONDANSETRON 8 MG: 2 INJECTION INTRAMUSCULAR; INTRAVENOUS at 05:04

## 2017-04-16 RX ADMIN — Medication 10 ML: at 06:04

## 2017-04-16 RX ADMIN — ONDANSETRON 8 MG: 2 INJECTION INTRAMUSCULAR; INTRAVENOUS at 12:04

## 2017-04-16 RX ADMIN — Medication 10 ML: at 11:04

## 2017-04-16 RX ADMIN — Medication 10 ML: at 12:04

## 2017-04-16 RX ADMIN — Medication: at 11:04

## 2017-04-16 RX ADMIN — I.V. FAT EMULSION 250 ML: 20 EMULSION INTRAVENOUS at 08:04

## 2017-04-16 RX ADMIN — PANTOPRAZOLE SODIUM 40 MG: 40 INJECTION, POWDER, FOR SOLUTION INTRAVENOUS at 08:04

## 2017-04-16 RX ADMIN — ENOXAPARIN SODIUM 40 MG: 100 INJECTION SUBCUTANEOUS at 05:04

## 2017-04-16 RX ADMIN — Medication: at 08:04

## 2017-04-16 NOTE — PROGRESS NOTES
[facility]  Hematology/Oncology  Progress Note    Patient Name: Nikia Lamas  Admission Date: 3/31/2017  Hospital Length of Stay: 16 days  Code Status: Full Code     Subjective:         Interval History:   No new issues. Pain seems to have been well controlled on current regimen over last 24 hours. Patient continues to ambulate.  Will try to find a hospice company for TPN and PCA to go home with, or see if they have better options for her.     Oncology Treatment Plan:   [No treatment plan]    Medications:  Continuous Infusions:   Amino acid 5% - dextrose 20% (CLINIMIX-E) solution with additives (1L provides 50 gm AA, 200 gm CHO (680 kcal/L dextrose), Na 35, K 30, Mg 5, Ca 4.5, Acetate 80, Cl 39, Phos 15) 40 mL/hr at 04/15/17 1720    hydromorphone in 0.9 % NaCl 6 mg/30 ml       Scheduled Meds:   enoxaparin  40 mg Subcutaneous Q24H    fat emulsion 20%  250 mL Intravenous Daily    fentaNYL  2 patch Transdermal Q72H    ondansetron  8 mg Intravenous Q8H    pantoprazole  40 mg Intravenous Daily    sodium chloride 0.9%  10 mL Intravenous Q6H    sodium chloride 0.9%  3 mL Intravenous Q8H    sodium phosphates  1 enema Rectal Once     PRN Meds:naloxone, promethazine, Flushing PICC Protocol **AND** sodium chloride 0.9% **AND** sodium chloride 0.9%     Review of Systems   Constitutional: Negative for chills and fever.   HENT: Negative for congestion.    Respiratory: Negative for cough and shortness of breath.    Cardiovascular: Negative for chest pain, palpitations and leg swelling.   Gastrointestinal: Positive for abdominal pain, constipation and nausea. Negative for abdominal distention and blood in stool.   Genitourinary: Negative for dysuria and hematuria.   Skin: Negative for rash and wound.     Objective:     Vital Signs (Most Recent):  Temp: 97 °F (36.1 °C) (04/16/17 0800)  Pulse: (!) 111 (04/16/17 0800)  Resp: 16 (04/16/17 0800)  BP: 109/65 (04/16/17 0800)  SpO2: 99 % (04/16/17 0800)  Vital Signs (24h Range):  Temp:  [97 °F (36.1 °C)-98.7 °F (37.1 °C)] 97 °F (36.1 °C)  Pulse:  [106-114] 111  Resp:  [16-18] 16  SpO2:  [92 %-99 %] 99 %  BP: ()/(60-70) 109/65     Weight: 40.8 kg (90 lb)  Body mass index is 15.45 kg/(m^2).  Body surface area is 1.36 meters squared.      Intake/Output Summary (Last 24 hours) at 04/16/17 1051  Last data filed at 04/16/17 0600   Gross per 24 hour   Intake           1557.4 ml   Output             1100 ml   Net            457.4 ml       Physical Exam   Constitutional: She is oriented to person, place, and time. She appears well-developed.   Fatigued, malnourished   HENT:   Head: Normocephalic.   Eyes: Pupils are equal, round, and reactive to light.   Neck: Neck supple. No JVD present.   Cardiovascular: Normal rate, regular rhythm and normal heart sounds.    No murmur heard.  Pulmonary/Chest: Effort normal. No respiratory distress. She has decreased breath sounds. She has no wheezes. She has no rhonchi.   Abdominal:   middle vertical abdominal incision with staples. Venting gastrostomy tube noted in left abdominal quadrant. Hypoactive bowel sounds.   Musculoskeletal: She exhibits edema (pitting bilateral legs and adominal wall).   Neurological: She is oriented to person, place, and time.   Skin: Skin is warm.   Psychiatric: She has a normal mood and affect.     Significant Labs:   Labs have been reviewed.      Recent Labs  Lab 04/16/17  0510   WBC 13.72*   RBC 3.33*   HGB 9.1*   HCT 27.6*   *   MCV 83   MCH 27.3   MCHC 33.0       Recent Labs  Lab 04/16/17  0510   CALCIUM 8.9   PROT 6.1   *   K 5.0   CO2 23   CL 96   BUN 57*   CREATININE 1.1   ALKPHOS 123   ALT 14   AST 20   BILITOT 7.3*     Imaging: AXR: no obstruction noted, but residual contrast in large bowel    Assessment/Plan:     * Small bowel obstruction  - adhesions and ascites seen during Ex Lap with thickening of peritoneum and omental caking.   - POD 5, still without flatus/BM   - continue NPO  except water and ice chips  - biopsy shows poorly differentiated carcinoma  - patient has venting gastrostomy tube to gravity  - continue pain control per palliative care. We will ask our  to see if there is a Hospice agency that will accommodate a PCA pump.  - continue total parenteral nutrition.  - hyperbilirubinemia possibly from obstruction but last scan did not show dilated biliary ducts, likely 2/2 mechanical obstruction from mets    Metastatic renal cell carcinoma  - will need to follow up with Dr. Connelly in clinic   - Ex lap revealed omental caking, biopsy revealed poorly differentiated carcinoma  - likely not a candidate for further chemotherapy; however, patient will follow up with Dr. Connelly after discharge.  - continue palliative care, patiently likely not a candidate for further chemo   - she has a fentanyl 200 mcg/hr patch with dilaudid PCA for breakthrough (0.6mg, 20 minute lockout)    - requires about 24mg over 24 hour period  - will get hospice involved for discharge planning with TPN and PCA/other pain control      Acute respiratory failure with hypoxia  - secondary to pulmonary metastases, volume overload/pulmonary edema, and inability to take deep inspirations due to abdominal pain  - continue supplemental oxygen, SpO2 goal of >90%   - requiring 2-4L NC  - incentive spirometry and PT/OT for atelectasis    Protein-calorie malnutrition, severe  - secondary to advanced cancer, bowel obstruction, and peritoneal carcinomatosis with omental caking  - venting gastrostomy tube has been placed  - continue total parenteral nutrition per surgery  - hyperbilirubinemia worsening, likely secondary to TPN. Continue to monitor.    Anemia  - Hb stable  - no acute bleeding noted   - G tube to gravity and no blood in fluid   - no hematuria   - patient yet to have BM   - patient does not appear to be hemolyzing     Dispo: will try to get home hospice for TPN and PCA/other, palliative care following      Tricia Charles MD  Hematology/Oncology  [facility]    STAFF NOTE:  I have personally taken the history and examined this patient and agree with residents Note as stated above

## 2017-04-16 NOTE — ASSESSMENT & PLAN NOTE
- secondary to advanced cancer, bowel obstruction, and peritoneal carcinomatosis with omental caking  - venting gastrostomy tube has been placed  - continue total parenteral nutrition per surgery  - hyperbilirubinemia worsening, likely secondary to TPN. Continue to monitor.

## 2017-04-16 NOTE — SUBJECTIVE & OBJECTIVE
Interval History:   No new issues. Pain seems to have been well controlled on current regimen over last 24 hours. Patient continues to ambulate.  Will try to find a hospice company for TPN and PCA to go home with, or see if they have better options for her.     Oncology Treatment Plan:   [No treatment plan]    Medications:  Continuous Infusions:   Amino acid 5% - dextrose 20% (CLINIMIX-E) solution with additives (1L provides 50 gm AA, 200 gm CHO (680 kcal/L dextrose), Na 35, K 30, Mg 5, Ca 4.5, Acetate 80, Cl 39, Phos 15) 40 mL/hr at 04/15/17 1720    hydromorphone in 0.9 % NaCl 6 mg/30 ml       Scheduled Meds:   enoxaparin  40 mg Subcutaneous Q24H    fat emulsion 20%  250 mL Intravenous Daily    fentaNYL  2 patch Transdermal Q72H    ondansetron  8 mg Intravenous Q8H    pantoprazole  40 mg Intravenous Daily    sodium chloride 0.9%  10 mL Intravenous Q6H    sodium chloride 0.9%  3 mL Intravenous Q8H    sodium phosphates  1 enema Rectal Once     PRN Meds:naloxone, promethazine, Flushing PICC Protocol **AND** sodium chloride 0.9% **AND** sodium chloride 0.9%     Review of Systems   Constitutional: Negative for chills and fever.   HENT: Negative for congestion.    Respiratory: Negative for cough and shortness of breath.    Cardiovascular: Negative for chest pain, palpitations and leg swelling.   Gastrointestinal: Positive for abdominal pain, constipation and nausea. Negative for abdominal distention and blood in stool.   Genitourinary: Negative for dysuria and hematuria.   Skin: Negative for rash and wound.     Objective:     Vital Signs (Most Recent):  Temp: 97 °F (36.1 °C) (04/16/17 0800)  Pulse: (!) 111 (04/16/17 0800)  Resp: 16 (04/16/17 0800)  BP: 109/65 (04/16/17 0800)  SpO2: 99 % (04/16/17 0800) Vital Signs (24h Range):  Temp:  [97 °F (36.1 °C)-98.7 °F (37.1 °C)] 97 °F (36.1 °C)  Pulse:  [106-114] 111  Resp:  [16-18] 16  SpO2:  [92 %-99 %] 99 %  BP: ()/(60-70) 109/65     Weight: 40.8 kg (90  lb)  Body mass index is 15.45 kg/(m^2).  Body surface area is 1.36 meters squared.      Intake/Output Summary (Last 24 hours) at 04/16/17 1051  Last data filed at 04/16/17 0600   Gross per 24 hour   Intake           1557.4 ml   Output             1100 ml   Net            457.4 ml       Physical Exam   Constitutional: She is oriented to person, place, and time. She appears well-developed.   Fatigued, malnourished   HENT:   Head: Normocephalic.   Eyes: Pupils are equal, round, and reactive to light.   Neck: Neck supple. No JVD present.   Cardiovascular: Normal rate, regular rhythm and normal heart sounds.    No murmur heard.  Pulmonary/Chest: Effort normal. No respiratory distress. She has decreased breath sounds. She has no wheezes. She has no rhonchi.   Abdominal:   middle vertical abdominal incision with staples. Venting gastrostomy tube noted in left abdominal quadrant. Hypoactive bowel sounds.   Musculoskeletal: She exhibits edema (pitting bilateral legs and adominal wall).   Neurological: She is oriented to person, place, and time.   Skin: Skin is warm.   Psychiatric: She has a normal mood and affect.     Significant Labs:   Labs have been reviewed.      Recent Labs  Lab 04/16/17  0510   WBC 13.72*   RBC 3.33*   HGB 9.1*   HCT 27.6*   *   MCV 83   MCH 27.3   MCHC 33.0       Recent Labs  Lab 04/16/17  0510   CALCIUM 8.9   PROT 6.1   *   K 5.0   CO2 23   CL 96   BUN 57*   CREATININE 1.1   ALKPHOS 123   ALT 14   AST 20   BILITOT 7.3*     Imaging: AXR: no obstruction noted, but residual contrast in large bowel

## 2017-04-16 NOTE — ASSESSMENT & PLAN NOTE
- secondary to pulmonary metastases, volume overload/pulmonary edema, and inability to take deep inspirations due to abdominal pain  - continue supplemental oxygen, SpO2 goal of >90%   - requiring 2-4L NC  - incentive spirometry and PT/OT for atelectasis

## 2017-04-16 NOTE — ASSESSMENT & PLAN NOTE
- adhesions and ascites seen during Ex Lap with thickening of peritoneum and omental caking.   - POD 5, still without flatus/BM   - continue NPO except water and ice chips  - biopsy shows poorly differentiated carcinoma  - patient has venting gastrostomy tube to gravity  - continue pain control per palliative care. We will ask our  to see if there is a Hospice agency that will accommodate a PCA pump.  - continue total parenteral nutrition.  - hyperbilirubinemia possibly from obstruction but last scan did not show dilated biliary ducts, likely 2/2 mechanical obstruction from mets

## 2017-04-16 NOTE — ASSESSMENT & PLAN NOTE
- will need to follow up with Dr. Connelly in clinic   - Ex lap revealed omental caking, biopsy revealed poorly differentiated carcinoma  - likely not a candidate for further chemotherapy; however, patient will follow up with Dr. Connelly after discharge.  - continue palliative care, patiently likely not a candidate for further chemo   - she has a fentanyl 200 mcg/hr patch with dilaudid PCA for breakthrough (0.6mg, 20 minute lockout)    - requires about 24mg over 24 hour period  - will get hospice involved for discharge planning with TPN and PCA/other pain control

## 2017-04-16 NOTE — PLAN OF CARE
Problem: Patient Care Overview  Goal: Plan of Care Review  Outcome: Ongoing (interventions implemented as appropriate)  Pt in bed resting. C/o pain relieved with PRN PCA pain med. In no acute distress. Activity promoted. ambulates with r/w. diet tolerated well with no c/o n/v. No falls noted. Will continue to monitor. Call bell intact and in reach.

## 2017-04-17 LAB
ALBUMIN SERPL BCP-MCNC: 1.4 G/DL
ALP SERPL-CCNC: 148 U/L
ALT SERPL W/O P-5'-P-CCNC: 15 U/L
ANION GAP SERPL CALC-SCNC: 10 MMOL/L
ANISOCYTOSIS BLD QL SMEAR: SLIGHT
AST SERPL-CCNC: 19 U/L
BASOPHILS # BLD AUTO: ABNORMAL K/UL
BASOPHILS NFR BLD: 0 %
BILIRUB SERPL-MCNC: 8.4 MG/DL
BUN SERPL-MCNC: 61 MG/DL
CALCIUM SERPL-MCNC: 9.2 MG/DL
CHLORIDE SERPL-SCNC: 97 MMOL/L
CO2 SERPL-SCNC: 25 MMOL/L
CREAT SERPL-MCNC: 1.1 MG/DL
DIFFERENTIAL METHOD: ABNORMAL
EOSINOPHIL # BLD AUTO: ABNORMAL K/UL
EOSINOPHIL NFR BLD: 1 %
ERYTHROCYTE [DISTWIDTH] IN BLOOD BY AUTOMATED COUNT: 20.6 %
EST. GFR  (AFRICAN AMERICAN): >60 ML/MIN/1.73 M^2
EST. GFR  (NON AFRICAN AMERICAN): 58.7 ML/MIN/1.73 M^2
GLUCOSE SERPL-MCNC: 106 MG/DL
HCT VFR BLD AUTO: 27.2 %
HGB BLD-MCNC: 9 G/DL
LYMPHOCYTES # BLD AUTO: ABNORMAL K/UL
LYMPHOCYTES NFR BLD: 2 %
MAGNESIUM SERPL-MCNC: 2.9 MG/DL
MCH RBC QN AUTO: 27 PG
MCHC RBC AUTO-ENTMCNC: 33.1 %
MCV RBC AUTO: 82 FL
METAMYELOCYTES NFR BLD MANUAL: 1 %
MONOCYTES # BLD AUTO: ABNORMAL K/UL
MONOCYTES NFR BLD: 10 %
NEUTROPHILS NFR BLD: 83 %
NEUTS BAND NFR BLD MANUAL: 3 %
PHOSPHATE SERPL-MCNC: 5.6 MG/DL
PLATELET # BLD AUTO: 444 K/UL
PLATELET BLD QL SMEAR: ABNORMAL
PMV BLD AUTO: 11.1 FL
POLYCHROMASIA BLD QL SMEAR: ABNORMAL
POTASSIUM SERPL-SCNC: 5.4 MMOL/L
PROT SERPL-MCNC: 6.3 G/DL
RBC # BLD AUTO: 3.33 M/UL
SODIUM SERPL-SCNC: 132 MMOL/L
WBC # BLD AUTO: 15.43 K/UL

## 2017-04-17 PROCEDURE — 99233 SBSQ HOSP IP/OBS HIGH 50: CPT | Mod: ,,, | Performed by: INTERNAL MEDICINE

## 2017-04-17 PROCEDURE — 85007 BL SMEAR W/DIFF WBC COUNT: CPT

## 2017-04-17 PROCEDURE — 25000003 PHARM REV CODE 250: Performed by: STUDENT IN AN ORGANIZED HEALTH CARE EDUCATION/TRAINING PROGRAM

## 2017-04-17 PROCEDURE — 25000003 PHARM REV CODE 250: Performed by: INTERNAL MEDICINE

## 2017-04-17 PROCEDURE — 63600175 PHARM REV CODE 636 W HCPCS: Performed by: INTERNAL MEDICINE

## 2017-04-17 PROCEDURE — 63600175 PHARM REV CODE 636 W HCPCS: Performed by: STUDENT IN AN ORGANIZED HEALTH CARE EDUCATION/TRAINING PROGRAM

## 2017-04-17 PROCEDURE — 84100 ASSAY OF PHOSPHORUS: CPT

## 2017-04-17 PROCEDURE — C9113 INJ PANTOPRAZOLE SODIUM, VIA: HCPCS | Performed by: PHYSICIAN ASSISTANT

## 2017-04-17 PROCEDURE — 36415 COLL VENOUS BLD VENIPUNCTURE: CPT

## 2017-04-17 PROCEDURE — 63600175 PHARM REV CODE 636 W HCPCS: Performed by: PHYSICIAN ASSISTANT

## 2017-04-17 PROCEDURE — 80053 COMPREHEN METABOLIC PANEL: CPT

## 2017-04-17 PROCEDURE — 11000001 HC ACUTE MED/SURG PRIVATE ROOM

## 2017-04-17 PROCEDURE — 85027 COMPLETE CBC AUTOMATED: CPT

## 2017-04-17 PROCEDURE — 83735 ASSAY OF MAGNESIUM: CPT

## 2017-04-17 RX ORDER — SODIUM CHLORIDE 0.9 % (FLUSH) 0.9 %
10 SYRINGE (ML) INJECTION
Start: 2017-04-17

## 2017-04-17 RX ORDER — DEXTROSE MONOHYDRATE 50 MG/ML
INJECTION, SOLUTION INTRAVENOUS CONTINUOUS
Status: ACTIVE | OUTPATIENT
Start: 2017-04-17 | End: 2017-04-17

## 2017-04-17 RX ORDER — LACTULOSE 10 G/15ML
30 SOLUTION ORAL EVERY 6 HOURS PRN
Qty: 500 ML | Refills: 0 | Status: SHIPPED | OUTPATIENT
Start: 2017-04-17 | End: 2017-04-27

## 2017-04-17 RX ADMIN — Medication: at 11:04

## 2017-04-17 RX ADMIN — Medication 3 ML: at 01:04

## 2017-04-17 RX ADMIN — ONDANSETRON 8 MG: 2 INJECTION INTRAMUSCULAR; INTRAVENOUS at 12:04

## 2017-04-17 RX ADMIN — Medication: at 04:04

## 2017-04-17 RX ADMIN — ONDANSETRON 8 MG: 2 INJECTION INTRAMUSCULAR; INTRAVENOUS at 04:04

## 2017-04-17 RX ADMIN — Medication 10 ML: at 05:04

## 2017-04-17 RX ADMIN — ONDANSETRON 8 MG: 2 INJECTION INTRAMUSCULAR; INTRAVENOUS at 08:04

## 2017-04-17 RX ADMIN — Medication 3 ML: at 10:04

## 2017-04-17 RX ADMIN — DEXTROSE: 5 SOLUTION INTRAVENOUS at 02:04

## 2017-04-17 RX ADMIN — Medication 10 ML: at 01:04

## 2017-04-17 RX ADMIN — FENTANYL 2 PATCH: 100 PATCH, EXTENDED RELEASE TRANSDERMAL at 11:04

## 2017-04-17 RX ADMIN — ENOXAPARIN SODIUM 40 MG: 100 INJECTION SUBCUTANEOUS at 05:04

## 2017-04-17 RX ADMIN — PANTOPRAZOLE SODIUM 40 MG: 40 INJECTION, POWDER, FOR SOLUTION INTRAVENOUS at 08:04

## 2017-04-17 NOTE — PROGRESS NOTES
"Met with the patient and her family: She was confused and her stepfather stated that "they stopped the TPN and discussed hospice". Explained hospice services in and outpatient to them. They did not feel that the family would be able to handle outpatient hospice services and they were in agreement with inpatient hospice services. They would however like to have the patient's two children make the decision. Provided them with contact information as well as information on the inpatient hospices in [facility]. They stated that the children would like to visit the hospices and that they would contact with their decision. Informed Dr Patterson and the Oncology team. Will follow for supportive counseling and final discharge arrangements.    "

## 2017-04-17 NOTE — PLAN OF CARE
[facility]     Department of Hospital Medicine     [address] [place] [address]     [phone] after hours  [phone] fax                                   HOSPICE  ORDERS     04/17/2017    Admit to Hospice:  Inpatient Service    Diagnoses:  Active Hospital Problems    Diagnosis  POA    *Small bowel obstruction [K56.69]  Yes    Anemia [D64.9]  No    Acute respiratory failure with hypoxia [J96.01]  Yes    Protein-calorie malnutrition, severe [E43]  Yes    Metastatic renal cell carcinoma [C64.9]  Yes    Bone metastases [C79.51]  Yes    Retroperitoneal lymphadenopathy [R59.0]  Yes    Lung metastases [C78.00]  Yes      Resolved Hospital Problems    Diagnosis Date Resolved POA   No resolved problems to display.       Hospice Qualifying Diagnoses: metastatic renal cell carcinoma       Patient has a life expectancy < 6 months due to these conditions.    Vital Signs: Routine per Hospice Protocol.    Allergies:  Review of patient's allergies indicates:   Allergen Reactions    No known drug allergies        Diet: Regular as tolerated    Activities: As tolerated    Nursing: Per Hospice Routine    Future Orders:  Hospice Medical Director may dictate new orders for comfortable care measures & sign death certificate.    Medications:    Nikia Lamas   Home Medication Instructions SHANNON:[id number]    Printed on:04/18/17 1340   Medication Information                      fentaNYL (DURAGESIC) 100 mcg/hr  Place 1 patch onto the skin every 48 hours.             lactulose (CHRONULAC) 20 gram/30 mL Soln  Take 45 mLs (30 g total) by mouth every 6 (six) hours as needed. Start every 3 hours until bowel movement, then q6 PRN.             ondansetron (ZOFRAN-ODT) 8 MG TbDL  Take 1 tablet (8 mg total) by mouth every 12 (twelve) hours as needed (nausea/vomiting).             promethazine (PHENERGAN) 25 MG suppository  Place 1 suppository (25 mg total) rectally every 6  (six) hours as needed.             sodium chloride 0.9% 0.9 % injection  Inject 10 mLs into the vein as needed.             valacyclovir (VALTREX) 500 MG tablet  TAKE 1 TABLET (500 MG TOTAL) BY MOUTH ONCE DAILY.             Hydromorphone PCA: 0.6 mg PCA q20 minutes; 2 mg/hr max; 0 mg/hr basal infusion     Brandon Chawla MD  04/17/2017

## 2017-04-17 NOTE — PROGRESS NOTES
Progress Note  Hematology and Oncology    Patient Name: Nikia Lamas  YOB: 1965    Admit Date: 3/31/2017                     LOS: 17    SUBJECTIVE:     Reason for Admission:  Small bowel obstruction  See H&P for detailed presenting history and ROS.      Interval history: Patient reports pain not well controlled overnight. Sister and father at bedside. The sister is concerned that she has been confused at times. Discussed plan for optimization of pain control.    ROS: Denies headache, chest pain, dyspnea, cough, abdominal pain, difficulty urinating. Endorses lower extremity swelling, back pain    OBJECTIVE:     Vital Signs Range (Last 24H):  Temp:  [96.1 °F (35.6 °C)-98.1 °F (36.7 °C)]   Pulse:  [106-119]   Resp:  [17-20]   BP: ()/(62-76)   SpO2:  [95 %-99 %] Body mass index is 15.45 kg/(m^2).  Wt Readings from Last 1 Encounters:   04/05/17 1015 40.8 kg (90 lb)   03/31/17 1618 40.8 kg (90 lb)   03/31/17 0710 40.8 kg (90 lb)       I & O (Last 24H):  Intake/Output Summary (Last 24 hours) at 04/17/17 0848  Last data filed at 04/17/17 0500   Gross per 24 hour   Intake              660 ml   Output             1700 ml   Net            -1040 ml       Physical Exam   Constitutional: She is oriented to person, place, and time. She appears well-developed.   Fatigued, malnourished   HENT:   Head: Normocephalic.   Eyes: Pupils are equal, round, and reactive to light.   Neck: Neck supple. No JVD present.   Cardiovascular: Normal rate, regular rhythm and normal heart sounds.   No murmur heard.  Pulmonary/Chest: Effort normal. No respiratory distress. She has decreased breath sounds. She has no wheezes. She has no rhonchi.   Abdominal:   middle vertical abdominal incision with staples. Venting gastrostomy tube noted in left abdominal quadrant. Hypoactive bowel sounds.   Musculoskeletal: She exhibits edema (pitting bilateral legs and adominal wall). - reportedly improved today  Neurological: She is  oriented to person, place, and time.   Skin: Skin is warm.   Psychiatric: She has a normal mood and affect.     Diagnostic Results:  Lab Results   Component Value Date    WBC 15.43 (H) 04/17/2017    HGB 9.0 (L) 04/17/2017    HCT 27.2 (L) 04/17/2017    MCV 82 04/17/2017     (H) 04/17/2017       Recent Labs  Lab 04/17/17  0435      *   K 5.4*   CL 97   CO2 25   BUN 61*   CREATININE 1.1   CALCIUM 9.2   MG 2.9*     Lab Results   Component Value Date    INR 1.1 03/31/2017     Lab Results   Component Value Date    HGBA1C 5.6 10/13/2011     No results for input(s): POCTGLUCOSE in the last 72 hours.    ASSESSMENT/PLAN:     Active Hospital Problems    Diagnosis  POA    *Small bowel obstruction [K56.69]  Yes    Anemia [D64.9]  No    Acute respiratory failure with hypoxia [J96.01]  Yes    Protein-calorie malnutrition, severe [E43]  Yes    Metastatic renal cell carcinoma [C64.9]  Yes    Bone metastases [C79.51]  Yes    Retroperitoneal lymphadenopathy [R59.0]  Yes    Lung metastases [C78.00]  Yes      Resolved Hospital Problems    Diagnosis Date Resolved POA   No resolved problems to display.       Small bowel obstruction  - adhesions and ascites seen during Ex Lap with thickening of peritoneum and omental caking.  - POD 5, still without flatus/BM  - continue NPO except water and ice chips  - biopsy shows poorly differentiated carcinoma  - patient has venting gastrostomy tube to gravity  - continue pain control per palliative care. We will ask our  to see if there is a Hospice agency that will accommodate a PCA pump.  - continue total parenteral nutrition  - hyperbilirubinemia possibly from obstruction but last scan did not show dilated biliary ducts, likely 2/2 mechanical obstruction from mets     Metastatic renal cell carcinoma  - will need to follow up with Dr. Connelly in clinic  - Ex lap revealed omental caking, biopsy revealed poorly differentiated carcinoma  - likely not a candidate  for further chemotherapy- continue palliative care  - she has a fentanyl 200 mcg/hr patch with dilaudid PCA for breakthrough (0.6mg, 20 minute lockout) - patient continues to complain of pain, consider increase in basal coverage  - requires about 24mg over 24 hour period  - will get hospice involved for discharge planning with TPN and PCA/other pain control     Acute respiratory failure with hypoxia  - secondary to pulmonary metastases, volume overload/pulmonary edema, and inability to take deep inspirations due to abdominal pain  - continue supplemental oxygen, SpO2 goal of >90%  - requiring 2-4L NC  - incentive spirometry and PT/OT for atelectasis     Protein-calorie malnutrition, severe  - secondary to advanced cancer, bowel obstruction, and peritoneal carcinomatosis with omental caking  - venting gastrostomy tube has been placed  - continue total parenteral nutrition per surgery  - hyperbilirubinemia worsening, likely secondary to TPN. Continue to monitor.  - worsening encephalopathy, likely secondary to obstruction     Anemia  - Hb stable  - no acute bleeding noted  - G tube to gravity and no blood in fluid  - no hematuria  - patient yet to have BM   - patient does not appear to be hemolyzing      Dispo: will try to get home hospice for TPN and PCA/other, palliative care following     Brandon Chawla MD PGY1     STAFF NOTE:  I have personally taken the history and examined this patient and agree with residents Note as stated above

## 2017-04-17 NOTE — TELEPHONE ENCOUNTER
Dr Patterson,      Chart review reveals Ms Lamas will be being discharged with Home Hospice.  Is the Cabometyx discontinued?    Abilio Aranda, PharmD, [facility]  [facility]  [phone]

## 2017-04-17 NOTE — PT/OT/SLP PROGRESS
Physical Therapy      Nikia Lamas  MRN: [id number]    Attempted therapy 2x today.  On first attempt patient found on BSC and told therapist she has already walked 3x and needed a break.  On second attempt, RN notified therapist that patient has declined today and family is reconsidering d/c options.  Patient sleeping.  Rn requested therapist not disturb patient right now.    Patient is ambulating multiple times/day with family.  Therapy was supposed to train patient on safe use of Rollator walker. Will follow-up with patient at next scheduled treatment session for training with Rollator and likely d/c from therapy at that time.     .    Zoe Friedman, PT   4/17/2017

## 2017-04-17 NOTE — NURSING
Pt was assisted ambulating in the hallway. Pt walked the length of the hallway and returned to their bed, remaining free from falls or other injuries. Pt tolerated well. Will continue to monitor.

## 2017-04-17 NOTE — PLAN OF CARE
The patient and her family have decided to discharge home with hospice care.  Hospice arrangements made by SHIRA MICHAEL/zenia likely tomorrow pending delivery of equipment.  Will continue to follow.         04/17/17 1441   Final Note   Assessment Type Final Discharge Note   Discharge Disposition HospiceHome   Discharge planning education complete? Yes   Hospital Follow Up  Appt(s) scheduled? (none needed)   Offered [facility] -- Bedside Delivery? n/a   Discharge/Hospital Encounter Summary to ([facility]) PCP n/a   Referral to Outpatient Case Management complete? n/a   Referral to / orders for [facility]? (home hospice)   30 day supply of medicines given at discharge, if documented non-compliance / non-adherence? n/a   Any social issues identified prior to discharge? No

## 2017-04-18 ENCOUNTER — TELEPHONE (OUTPATIENT)
Dept: HEMATOLOGY/ONCOLOGY | Facility: CLINIC | Age: 52
End: 2017-04-18

## 2017-04-18 LAB
ALBUMIN SERPL BCP-MCNC: 1.3 G/DL
ALP SERPL-CCNC: 166 U/L
ALT SERPL W/O P-5'-P-CCNC: 15 U/L
ANION GAP SERPL CALC-SCNC: 11 MMOL/L
ANISOCYTOSIS BLD QL SMEAR: SLIGHT
AST SERPL-CCNC: 23 U/L
BASOPHILS # BLD AUTO: 0.03 K/UL
BASOPHILS NFR BLD: 0.2 %
BILIRUB SERPL-MCNC: 9.7 MG/DL
BUN SERPL-MCNC: 69 MG/DL
CALCIUM SERPL-MCNC: 9 MG/DL
CHLORIDE SERPL-SCNC: 95 MMOL/L
CO2 SERPL-SCNC: 23 MMOL/L
CREAT SERPL-MCNC: 1.4 MG/DL
DIFFERENTIAL METHOD: ABNORMAL
EOSINOPHIL # BLD AUTO: 0.2 K/UL
EOSINOPHIL NFR BLD: 1.7 %
ERYTHROCYTE [DISTWIDTH] IN BLOOD BY AUTOMATED COUNT: 21 %
EST. GFR  (AFRICAN AMERICAN): 50.5 ML/MIN/1.73 M^2
EST. GFR  (NON AFRICAN AMERICAN): 43.8 ML/MIN/1.73 M^2
GLUCOSE SERPL-MCNC: 98 MG/DL
HCT VFR BLD AUTO: 26.6 %
HGB BLD-MCNC: 8.9 G/DL
HYPOCHROMIA BLD QL SMEAR: ABNORMAL
LYMPHOCYTES # BLD AUTO: 0.7 K/UL
LYMPHOCYTES NFR BLD: 4.8 %
MAGNESIUM SERPL-MCNC: 2.5 MG/DL
MCH RBC QN AUTO: 26.8 PG
MCHC RBC AUTO-ENTMCNC: 33.5 %
MCV RBC AUTO: 80 FL
MONOCYTES # BLD AUTO: 1.6 K/UL
MONOCYTES NFR BLD: 11.6 %
NEUTROPHILS # BLD AUTO: 11.1 K/UL
NEUTROPHILS NFR BLD: 81.7 %
OVALOCYTES BLD QL SMEAR: ABNORMAL
PHOSPHATE SERPL-MCNC: 6.6 MG/DL
PLATELET # BLD AUTO: 458 K/UL
PLATELET BLD QL SMEAR: ABNORMAL
PMV BLD AUTO: 10.9 FL
POIKILOCYTOSIS BLD QL SMEAR: SLIGHT
POLYCHROMASIA BLD QL SMEAR: ABNORMAL
POTASSIUM SERPL-SCNC: 5.8 MMOL/L
PROT SERPL-MCNC: 6.2 G/DL
RBC # BLD AUTO: 3.32 M/UL
SODIUM SERPL-SCNC: 129 MMOL/L
TARGETS BLD QL SMEAR: ABNORMAL
WBC # BLD AUTO: 13.79 K/UL

## 2017-04-18 PROCEDURE — 94770 HC EXHALED C02 TEST: CPT

## 2017-04-18 PROCEDURE — 25000003 PHARM REV CODE 250: Performed by: INTERNAL MEDICINE

## 2017-04-18 PROCEDURE — 63600175 PHARM REV CODE 636 W HCPCS: Performed by: STUDENT IN AN ORGANIZED HEALTH CARE EDUCATION/TRAINING PROGRAM

## 2017-04-18 PROCEDURE — 83735 ASSAY OF MAGNESIUM: CPT

## 2017-04-18 PROCEDURE — 85025 COMPLETE CBC W/AUTO DIFF WBC: CPT

## 2017-04-18 PROCEDURE — 84100 ASSAY OF PHOSPHORUS: CPT

## 2017-04-18 PROCEDURE — 99233 SBSQ HOSP IP/OBS HIGH 50: CPT | Mod: ,,, | Performed by: INTERNAL MEDICINE

## 2017-04-18 PROCEDURE — 80053 COMPREHEN METABOLIC PANEL: CPT

## 2017-04-18 PROCEDURE — 25000003 PHARM REV CODE 250: Performed by: STUDENT IN AN ORGANIZED HEALTH CARE EDUCATION/TRAINING PROGRAM

## 2017-04-18 PROCEDURE — 36415 COLL VENOUS BLD VENIPUNCTURE: CPT

## 2017-04-18 PROCEDURE — 63600175 PHARM REV CODE 636 W HCPCS: Performed by: PHYSICIAN ASSISTANT

## 2017-04-18 PROCEDURE — C9113 INJ PANTOPRAZOLE SODIUM, VIA: HCPCS | Performed by: PHYSICIAN ASSISTANT

## 2017-04-18 PROCEDURE — 63600175 PHARM REV CODE 636 W HCPCS: Performed by: INTERNAL MEDICINE

## 2017-04-18 PROCEDURE — 99900035 HC TECH TIME PER 15 MIN (STAT)

## 2017-04-18 PROCEDURE — 11000001 HC ACUTE MED/SURG PRIVATE ROOM

## 2017-04-18 PROCEDURE — 97530 THERAPEUTIC ACTIVITIES: CPT

## 2017-04-18 RX ORDER — FENTANYL 100 UG/H
1 PATCH TRANSDERMAL
Status: DISCONTINUED | OUTPATIENT
Start: 2017-04-19 | End: 2017-04-19 | Stop reason: HOSPADM

## 2017-04-18 RX ORDER — VALACYCLOVIR HYDROCHLORIDE 500 MG/1
TABLET, FILM COATED ORAL
Qty: 30 TABLET | Refills: 12
Start: 2017-04-18

## 2017-04-18 RX ORDER — FENTANYL 100 UG/H
1 PATCH TRANSDERMAL
Status: DISCONTINUED | OUTPATIENT
Start: 2017-04-19 | End: 2017-04-18

## 2017-04-18 RX ORDER — ONDANSETRON 8 MG/1
8 TABLET, ORALLY DISINTEGRATING ORAL EVERY 12 HOURS PRN
Qty: 20 TABLET | Refills: 0
Start: 2017-04-18

## 2017-04-18 RX ORDER — FENTANYL 100 UG/H
1 PATCH TRANSDERMAL
Refills: 0
Start: 2017-04-19

## 2017-04-18 RX ORDER — PROMETHAZINE HYDROCHLORIDE 25 MG/1
25 SUPPOSITORY RECTAL EVERY 6 HOURS PRN
Start: 2017-04-18

## 2017-04-18 RX ORDER — FENTANYL 100 UG/H
2 PATCH TRANSDERMAL
Refills: 0
Start: 2017-04-18 | End: 2017-04-18 | Stop reason: HOSPADM

## 2017-04-18 RX ADMIN — Medication: at 06:04

## 2017-04-18 RX ADMIN — Medication: at 10:04

## 2017-04-18 RX ADMIN — Medication: at 09:04

## 2017-04-18 RX ADMIN — ONDANSETRON 8 MG: 2 INJECTION INTRAMUSCULAR; INTRAVENOUS at 12:04

## 2017-04-18 RX ADMIN — ONDANSETRON 8 MG: 2 INJECTION INTRAMUSCULAR; INTRAVENOUS at 11:04

## 2017-04-18 RX ADMIN — ONDANSETRON 8 MG: 2 INJECTION INTRAMUSCULAR; INTRAVENOUS at 03:04

## 2017-04-18 RX ADMIN — ONDANSETRON 8 MG: 2 INJECTION INTRAMUSCULAR; INTRAVENOUS at 08:04

## 2017-04-18 RX ADMIN — Medication 3 ML: at 06:04

## 2017-04-18 RX ADMIN — Medication 3 ML: at 10:04

## 2017-04-18 RX ADMIN — Medication 10 ML: at 06:04

## 2017-04-18 RX ADMIN — Medication 10 ML: at 12:04

## 2017-04-18 RX ADMIN — Medication 3 ML: at 02:04

## 2017-04-18 RX ADMIN — PANTOPRAZOLE SODIUM 40 MG: 40 INJECTION, POWDER, FOR SOLUTION INTRAVENOUS at 08:04

## 2017-04-18 NOTE — PROGRESS NOTES
Palliative Care Daily Progress Note   04/18/2017    A/P: 51 yr old woman with metastatic renal cell ca to the lungs, now with peritoneal carcinomatosis, on TPN, admitted for terminal/malignant SBO.  Pal med following for goals of care and symptom management.      Plan/Recommendations:      1. Pain: Nociceptive somatic + visceral due to underlying cancer  - Pain improved after initiating a new Fentanyl patch, but then with worsening pain when approaching the 72h dash.  This is likely 2/2 decreased fat limiting absorption.  Recommend to continue Fentanyl patch but change from q72h to 100 q48h.  If hospice facility doesn't like Fentanyl patches as they can be expensive, can consider changing to a continuos PCA.  - Given that patient's g-tube unable to be clamped and is currently draining to gravity, patient likely would not benefit from oral medications for breakthrough pain. As such, recommend continuing PCA both now and at discharge (patient has PICC for TPN).  Continue Dilaudid PCA dosage to 0.6 mg bolus, q20 min lockout.  - If PCA not able to be continued at home and hospice not an option for patient, can give Roxanol 60-80 mg (3-4mL) Q2H PRN as this has some buccal absorption   - Consider starting 6 mg IV decadron daily, to help inflammation and edema in patients with terminal bowel obstruction     2. Constipation: 2/2 obstruction from omental caking.  Last BM 3-4 weeks ago  - Can consider brown bomb or similar enema, but likely will be inefficient     3. Nausea:   - Continue scheduled Zofran 8 mg IV Q8 and PRN phenergan suppositories      4. Goals of Care:   - Inpatient hospice setting would be ideal place for pt to receive the kind of intensive sx management that she will need moving forward.     - Ideally, code status should be addressed prior to d/c.  Palliative care able to discuss if the primary team desires this.  Life expectancy likely days      BP 95/65  Pulse 99  Temp 96.2 °F (35.7 °C)  Resp 16  Ht  "5' 4" (1.626 m)  Wt 40.8 kg (90 lb)  LMP 01/03/2017  SpO2 95% Comment: 95  Breastfeeding? No  BMI 15.45 kg/m2  Gen: appears comfortable during staff rounds  HEENT: WNL  CV/Resp: breathing comfortably, on 4L NC oxygen  Abd: midline abd incision no erythema or drainage  Venting G tube; + BS, firm abdomen   Extremities: bilateral lower extremity pitting edema, left > right  Neuro: O x 3, interactive and appropriate    SUBJECTIVE:      Interval History: Yesterday patient got up and walked the hallways.  Still has nausea that is controlled with scheduled Zofran.  Family reports that she initially has good relief when getting a new Fentanyl patch, but then is waiting to press the button and counting the minutes.  Still no BM.     HPI: 52 yo F with PMHx renal cell carcinoma with mets to liver and bone s/p L nephrectomy 01/2017 presents with 8 d constipation with associated nausea/vomiting intermittently for past 7 days. She notes difficulty keeping down any food originally but had been using some Ensure supplements. Had been doing daily PEG 17 g but decided to try a magnesium citrate due to claims to help with abdominal cramping. She had no relief with this. Tried lactulose with increase in abd pain and cramping and a couple limited watery bowel movements after using this. Notes approx 4 lb weight loss over past week or so. She has been on cabozantinib 60 mg po qd since 01/25/17 with no issues with bowel obstruction since starting chemo. Has abdominal procedures in [facility] including recent L nephrectomy, 2 C sections, and tubal ligation.     Patient seen and examined with Dr. Bautista.  Staff attestation to follow.    Monica Wall MD  Med PGY3  [phone]    "

## 2017-04-18 NOTE — PROGRESS NOTES
"Met with the patient's daughter, her son and her stepfather: Explained hospice (in and outpatient) to them and answered all their questions. They planned to visit two inpatient hospices and called back to inform that they would like to have a referral to [facility]. Referral made to Edyta (). Also spoke to Alvaro () and Amelia Worley. They agreed to connect the patient to her PCA pump here at the hospital and to call as soon as this was done - will arrange transportation at this point. Spoke to the patient's nurse on the 5th floor, Malina (ext [phone]) and she agreed to call report to the nurse at [facility] (provided her with the contact information - [phone]). Spoke to the patient's mother to notify her of arrangements. Also arranged for transportation through [facility] (on "will call" arrangement). Awaiting call from hospice to notify when patient will be connected to PCA pump. Notified the Oncology team.   "

## 2017-04-18 NOTE — TELEPHONE ENCOUNTER
Spoke with patient's sister, who is calling to request dr escobedo's fax number, as she needs [facility] paperwork completed, as she is from [place] and is here in town taking care of her sister.  Nurse provided patient's sister with [phone].

## 2017-04-18 NOTE — PROGRESS NOTES
Progress Note  Hematology and Oncology    Patient Name: Nikia Lamas  YOB: 1965    Admit Date: 3/31/2017                     LOS: 18    SUBJECTIVE:     Reason for Admission:  Small bowel obstruction  See H&P for detailed presenting history and ROS.      Interval history: Patient reports pain well controlled overnight. Discussed plan for transition to hospice today pending choice as well as goal of symptom management    ROS: Denies headache, chest pain, dyspnea, cough, abdominal pain, difficulty urinating. Endorses lower extremity swelling, back pain    OBJECTIVE:     Vital Signs Range (Last 24H):  Temp:  [95.3 °F (35.2 °C)-97.5 °F (36.4 °C)]   Pulse:  []   Resp:  [16-18]   BP: ()/(53-70)   SpO2:  [94 %-99 %] Body mass index is 15.45 kg/(m^2).  Wt Readings from Last 1 Encounters:   04/05/17 1015 40.8 kg (90 lb)   03/31/17 1618 40.8 kg (90 lb)   03/31/17 0710 40.8 kg (90 lb)       I & O (Last 24H):    Intake/Output Summary (Last 24 hours) at 04/18/17 1129  Last data filed at 04/18/17 0000   Gross per 24 hour   Intake              300 ml   Output              450 ml   Net             -150 ml       Physical Exam   Constitutional: She is oriented to person, place, and time. She appears well-developed.   Fatigued, malnourished   HENT:   Head: Normocephalic.   Eyes: Pupils are equal, round, and reactive to light.   Neck: Neck supple. No JVD present.   Cardiovascular: Normal rate, regular rhythm and normal heart sounds.   No murmur heard.  Pulmonary/Chest: Effort normal. No respiratory distress. She has decreased breath sounds. She has no wheezes. She has no rhonchi.   Abdominal:   middle vertical abdominal incision with staples. Venting gastrostomy tube noted in left abdominal quadrant. Hypoactive bowel sounds.   Musculoskeletal: She exhibits edema (pitting bilateral legs). - reportedly improved today  Neurological: She is oriented to person, place, and time.   Skin: Skin is warm.    Psychiatric: She has a normal mood and affect.     Diagnostic Results:  Lab Results   Component Value Date    WBC 13.79 (H) 04/18/2017    HGB 8.9 (L) 04/18/2017    HCT 26.6 (L) 04/18/2017    MCV 80 (L) 04/18/2017     (H) 04/18/2017       Recent Labs  Lab 04/18/17  0427   GLU 98   *   K 5.8*   CL 95   CO2 23   BUN 69*   CREATININE 1.4   CALCIUM 9.0   MG 2.5     Lab Results   Component Value Date    INR 1.1 03/31/2017     Lab Results   Component Value Date    HGBA1C 5.6 10/13/2011     No results for input(s): POCTGLUCOSE in the last 72 hours.    ASSESSMENT/PLAN:     Active Hospital Problems    Diagnosis  POA    *Small bowel obstruction [K56.69]  Yes    Anemia [D64.9]  No    Acute respiratory failure with hypoxia [J96.01]  Yes    Protein-calorie malnutrition, severe [E43]  Yes    Metastatic renal cell carcinoma [C64.9]  Yes    Bone metastases [C79.51]  Yes    Retroperitoneal lymphadenopathy [R59.0]  Yes    Lung metastases [C78.00]  Yes      Resolved Hospital Problems    Diagnosis Date Resolved POA   No resolved problems to display.     Metastatic renal cell carcinoma  - will need to follow up with Dr. Connelly in clinic  - Ex lap revealed omental caking, biopsy revealed poorly differentiated carcinoma  - likely not a candidate for further chemotherapy- continue palliative care  - she has a fentanyl 200 mcg/hr patch with dilaudid PCA for breakthrough (0.6mg, 20 minute lockout) - palliative recommending 100 mcg/hr q48 hours    Small bowel obstruction  - adhesions and ascites seen during Ex Lap with thickening of peritoneum and omental caking.  - POD 5, still without flatus/BM  - continue NPO except water and ice chips  - biopsy shows poorly differentiated carcinoma  - patient has venting gastrostomy tube to gravity  - continue total parenteral nutrition  - hyperbilirubinemia possibly from obstruction but last scan did not show dilated biliary ducts, likely 2/2 mechanical obstruction from  mets    Acute respiratory failure with hypoxia  - secondary to pulmonary metastases, volume overload/pulmonary edema, and inability to take deep inspirations due to abdominal pain  - continue supplemental oxygen, SpO2 goal of >90%  - requiring 2-4L NC  - incentive spirometry and PT/OT for atelectasis     Protein-calorie malnutrition, severe  - secondary to advanced cancer, bowel obstruction, and peritoneal carcinomatosis with omental caking  - venting gastrostomy tube has been placed  - continue total parenteral nutrition per surgery  - hyperbilirubinemia worsening, likely secondary to TPN. Continue to monitor.  - worsening encephalopathy, likely secondary to obstruction     Anemia  - Hb stable  - no acute bleeding noted  - G tube to gravity and no blood in fluid  - no hematuria  - patient yet to have BM   - patient does not appear to be hemolyzing      Dispo: inpatient hospice today    Brandon Chawla MD PGY1       STAFF NOTE:  I have personally taken the history and examined this patient and agree with residents Note as stated above

## 2017-04-18 NOTE — PLAN OF CARE
Problem: Physical Therapy Goal  Goal: Physical Therapy Goal  Goals to be met by: 2017     Patient will increase functional independence with mobility by performin. Supine to sit with Stand-by Assistance- MET  2. Sit to supine with Stand-by Assistance- MET  3. Sit to stand transfer with Modified [place]- MET  4. Bed to chair transfer with Modified [place] using no AD- MET  5. Gait x 300 feet with Stand-by Assistance using no assistive device.    Outcome: Ongoing (interventions implemented as appropriate)  Pt able to ambulate with family.

## 2017-04-18 NOTE — PT/OT/SLP PROGRESS
Physical Therapy  Treatment and Discharge    Nikia Lamas   MRN: [id number]   Admitting Diagnosis: Small bowel obstruction    PT Received On: 04/18/17  PT Start Time: 1324     PT Stop Time: 1345    PT Total Time (min): 21 min       Billable Minutes:  Therapeutic Activity 20    Treatment Type: Treatment  PT/PTA: PT             General Precautions: Standard, fall  Orthopedic Precautions:     Braces:           Subjective:  Communicated with RN prior to session.  Pt continues to decline medically with significant decrease in mobility and activity tolerance.  Family considering inpatient hospice.        Objective:    Patient found supine in bed resting with family at bedside.  Pt very weak and lethargic.  Therapist brought rollator walker for patient to see.  Patient has been mobilizing with family using RW in the [place].  Today she has been unable to ambulate 2* fatigue and lethargy.  Therapist discussed mobilization as tolerated with assist.  Pt interested in Rollator walker for ambulation, especially as her endurance is decreasing.  Therapist instructed patient and demonstrated locking brakes and safety with Rollator. Patient and family verbalizing understanding.      Patient left supine with all lines intact, call button in reach and family present.    Assessment:  Nikia Lamas is a 51 y.o. female with a medical diagnosis of Small bowel obstruction and presents with decline in activity tolerance.  Patient has been mobilizing with RW and family assist.  Therapist following up with patient for DME recs.  Patient and family now considering inpatient hospice.  Patient would greatly benefit from Rollator walker upon discharge if she is still ambulatory.  Will d/c from skilled PT services at this time.  Please re-consult therapy if new needs emerge.      Rehab identified problem list/impairments: Rehab identified problem list/impairments: weakness, impaired endurance, impaired functional mobilty, pain    Rehab  potential is fair.    Activity tolerance: Fair    Discharge recommendations: Discharge Facility/Level Of Care Needs: home with home health     Barriers to discharge: Barriers to Discharge: Decreased caregiver support    Equipment recommendations: Equipment Needed After Discharge:  (Rollator walker, BSC)     GOALS:   Physical Therapy Goals        Problem: Physical Therapy Goal    Goal Priority Disciplines Outcome Goal Variances Interventions   Physical Therapy Goal     PT/OT, PT Ongoing (interventions implemented as appropriate)     Description:  Goals to be met by: 2017     Patient will increase functional independence with mobility by performin. Supine to sit with Stand-by Assistance- MET  2. Sit to supine with Stand-by Assistance- MET  3. Sit to stand transfer with Modified [place]- MET  4. Bed to chair transfer with Modified [place] using no AD- MET  5. Gait  x 300 feet with Stand-by Assistance using no assistive device.                  PLAN:    D/c from skilled PT services.   Plan of Care expires: 17  Plan of Care reviewed with: patient, family         Zoe Friedman, PT  2017

## 2017-04-18 NOTE — TELEPHONE ENCOUNTER
----- Message from Radha Hill sent at 4/18/2017  2:59 PM CDT -----  Contact: Sister   Checking to see if faxed was received for the pt and paperwork needs to be filled out. Please call Chelsey [phone]

## 2017-04-19 ENCOUNTER — TELEPHONE (OUTPATIENT)
Dept: HEMATOLOGY/ONCOLOGY | Facility: CLINIC | Age: 52
End: 2017-04-19

## 2017-04-19 VITALS
SYSTOLIC BLOOD PRESSURE: 95 MMHG | HEART RATE: 103 BPM | BODY MASS INDEX: 15.36 KG/M2 | WEIGHT: 90 LBS | OXYGEN SATURATION: 99 % | TEMPERATURE: 98 F | HEIGHT: 64 IN | RESPIRATION RATE: 16 BRPM | DIASTOLIC BLOOD PRESSURE: 57 MMHG

## 2017-04-19 PROCEDURE — 99239 HOSP IP/OBS DSCHRG MGMT >30: CPT | Mod: ,,, | Performed by: INTERNAL MEDICINE

## 2017-04-19 NOTE — PROGRESS NOTES
[facility] with family.

## 2017-04-19 NOTE — PROGRESS NOTES
[facility] transport setup to [facility]. Transport is being billed to [facility].  Quote is $245.64.  Spoke with  on call - Dimas Real.  Approval was given to use [facility] as authorizing  for transport.

## 2017-04-19 NOTE — TELEPHONE ENCOUNTER
----- Message from Marina Sherman LCSW sent at 4/19/2017  2:22 PM CDT -----  Contact: Jody with [facility]   I think we need to answer the hospice physician?  Marina  ----- Message -----     From: Michelle Martinez RN     Sent: 4/19/2017  11:09 AM       To: Marina Sherman LCSW        ----- Message -----     From: Jessica Patterson MD     Sent: 4/19/2017  11:05 AM       To: Michelle Martinez RN    Talk to Marina and see if we need to answer that.  ----- Message -----     From: Tiffanie Maradiaga RN     Sent: 4/18/2017   4:15 PM       To: Jessica Patterson MD        ----- Message -----     From: Chandrika Allison     Sent: 4/18/2017   4:10 PM       To: Godwin Vera with [facility] is calling to see how much Dilaudid is given to pt, information is needed to get estimated dosage with physician at [facility].  Contact number [phone]

## 2017-04-19 NOTE — TELEPHONE ENCOUNTER
Spoke with jody at hospice agency. Jody was calling yesterday only to inform dr escobedo patient arrived to their facility. She has non questions about dilaudid.

## 2017-04-19 NOTE — PROGRESS NOTES
Spoke with Dr. Jesusita Albarran she is ok with patient transporting to [facility] without PCA pump.  Staff has been informed that pump is waiting for the patient at [place].

## 2017-04-20 LAB — BACTERIA BLD CULT: NORMAL

## 2017-04-20 NOTE — DISCHARGE SUMMARY
DISCHARGE SUMMARY  Hematology and Oncology    Team: Networked reference to record PCT     Patient Name: Nikia Lamas  YOB: 1965    Admit Date: 3/31/2017    Discharge Date: 04/20/2017    Discharge Attending Physician: Jessica Patterson MD    Diagnoses:  Active Hospital Problems    Diagnosis  POA    *Small bowel obstruction [K56.69]  Yes    Anemia [D64.9]  No    Acute respiratory failure with hypoxia [J96.01]  Yes    Protein-calorie malnutrition, severe [E43]  Yes    Metastatic renal cell carcinoma [C64.9]  Yes    Bone metastases [C79.51]  Yes    Retroperitoneal lymphadenopathy [R59.0]  Yes    Lung metastases [C78.00]  Yes      Resolved Hospital Problems    Diagnosis Date Resolved POA   No resolved problems to display.       Discharged Condition: admit problems have stabilized     HOSPITAL COURSE:      Initial Presentation:    50 yo F with PMHx renal cell carcinoma with mets to liver and bone s/p L nephrectomy 01/2017 presents with 8 d constipation with associated nausea/vomiting intermittently for past 7 days. She notes difficulty keeping down any food originally but had been using some Ensure supplements. Had been doing daily PEG 17 g but decided to try a magnesium citrate due to claims to help with abdominal cramping. She had no relief with this. Tried lactulose with increase in abd pain and cramping and a couple limited watery bowel movements after using this. Notes approx 4 lb weight loss over past week or so. She has been on cabozantinib 60 mg po qd since 01/25/17 with no issues with bowel obstruction since starting chemo. Has abdominal procedures in [facility] including recent L nephrectomy, 2 C sections, and tubal ligation.       Oncologic History (per Dr. Connelly's note dated 3/24/17):  Nikia Lamas is a 51 y.o. White female, referred by Dr. German, for management of metastatic renal cell carcinoma. Pt reports left mass was found incidentally while being worked up for a persistent dry  "cough that began in October 2016. She was evaluated with a CXR and a non-contrast chest CT.   12/14/16- CT of the chest without contrast showed multiple pulmonary nodes and a large left renal mass present. The L kidney was not fully evaluated as only the superior aspect was in the CT, but the mass was ~ 11 cm in diameter.   12/19/16- CT CAP reveals "Large heterogeneous left renal mass, consistent with renal cell carcinoma.  This mass does not appear to invade the renal vein or IVC.  Innumerable pulmonary nodules, retroperitoneal lymphadenopathy and osseous lytic lesions, concerning for metastatic disease. Indeterminate hepatic lesions, possibly metastatic disease."  1/3/17- Radical left nephrectomy    Course of Principle Problem for Admission:    The patient was initially admitted to general surgery for management of small bowel obstruction. The patient was initiated on the bologna protocol with clear liquid diet and ambulation encouraged as well as colace and miralax. Unfortunately conservative management of her SBO was unsuccessful and she was taken for an exploratory laparotomy where omental caking, large volume of ascites found. G tube was placed and biopsies were collected. TPN was initiated to provide nutrition while unable to tolerate PO intake and g-tube to gravity. The patient was transferred to the care of the inpatient hematology and oncology service on 4/7/17. Palliative care was consulted to assist with pain management as well as goals of care. Unfortunately no curative options remain and the patient is no longer a candidate for palliative chemotherapy or radiation. After extensive conversation with the patient and family the decision was made to transition to comfort measures and to transfer to inpatient hospice. After touring several facilities, the family decided on a facility they were comfortable with and we assisted in transferring the patient to [facility]. The patient was discharged in a " medically stable condition for transfer.    Other Medical Problems Addressed in the Hospital:    Small bowel obstruction, metastatic renal cell carcinoma, metastatic disease to lung    CONSULTS: palliative, general surgery    Last CBC/BMP/HgbA1c (if applicable):  Recent Results (from the past 336 hour(s))   CBC auto differential    Collection Time: 04/18/17  4:27 AM   Result Value Ref Range    WBC 13.79 (H) 3.90 - 12.70 K/uL    Hemoglobin 8.9 (L) 12.0 - 16.0 g/dL    Hematocrit 26.6 (L) 37.0 - 48.5 %    Platelets 458 (H) 150 - 350 K/uL   CBC auto differential    Collection Time: 04/17/17  4:35 AM   Result Value Ref Range    WBC 15.43 (H) 3.90 - 12.70 K/uL    Hemoglobin 9.0 (L) 12.0 - 16.0 g/dL    Hematocrit 27.2 (L) 37.0 - 48.5 %    Platelets 444 (H) 150 - 350 K/uL   CBC auto differential    Collection Time: 04/16/17  5:10 AM   Result Value Ref Range    WBC 13.72 (H) 3.90 - 12.70 K/uL    Hemoglobin 9.1 (L) 12.0 - 16.0 g/dL    Hematocrit 27.6 (L) 37.0 - 48.5 %    Platelets 373 (H) 150 - 350 K/uL     Recent Results (from the past 336 hour(s))   Basic metabolic panel    Collection Time: 04/08/17  5:45 AM   Result Value Ref Range    Sodium 138 136 - 145 mmol/L    Potassium 3.4 (L) 3.5 - 5.1 mmol/L    Chloride 100 95 - 110 mmol/L    CO2 30 (H) 23 - 29 mmol/L    BUN, Bld 10 6 - 20 mg/dL    Creatinine 0.5 0.5 - 1.4 mg/dL    Calcium 7.9 (L) 8.7 - 10.5 mg/dL    Anion Gap 8 8 - 16 mmol/L   Basic metabolic panel    Collection Time: 04/07/17  7:04 AM   Result Value Ref Range    Sodium 135 (L) 136 - 145 mmol/L    Potassium 3.8 3.5 - 5.1 mmol/L    Chloride 101 95 - 110 mmol/L    CO2 29 23 - 29 mmol/L    BUN, Bld 11 6 - 20 mg/dL    Creatinine 0.6 0.5 - 1.4 mg/dL    Calcium 7.8 (L) 8.7 - 10.5 mg/dL    Anion Gap 5 (L) 8 - 16 mmol/L     Lab Results   Component Value Date    HGBA1C 5.6 10/13/2011       Other Pertinent Lab Findings:      Pertinent/Significant Diagnostic Studies:    CT abdomen pelvis 4/4/17:    There is continued  evidence of multiple loops of dilated small bowel with evidence of clumping and distortion in the left mid abdomen.  This finding is concerning for a partial small bowel obstruction, possibly secondary to adhesion or internal herniation.    There are again scattered foci of small bowel wall thickening, which may be secondary to edema, infection, or ischemia.    In this patient who is status post left-sided nephrectomy for large renal mass, there is continued evidence of metastatic disease involving the lungs and axial skeleton.    Interval worsening of groundglass opacifications within both lower lobes, which may be secondary to an inflammatory or infectious process.  There is also evidence of 2 small fluid pleural effusions.    Interval development of a moderate to large amount of ascites.      Special Treatments/Procedures:     Disposition:  Hospice      Future Scheduled Appointments:  No future appointments.    Discharge Medication List:   Nikia Lamas   Home Medication Instructions SHANNON:[id number]    Printed on:04/20/17 0826   Medication Information                      fentaNYL (DURAGESIC) 100 mcg/hr  Place 1 patch onto the skin every 48 hours.             lactulose (CHRONULAC) 20 gram/30 mL Soln  Take 45 mLs (30 g total) by mouth every 6 (six) hours as needed. Start every 3 hours until bowel movement, then q6 PRN.             ondansetron (ZOFRAN-ODT) 8 MG TbDL  Take 1 tablet (8 mg total) by mouth every 12 (twelve) hours as needed (nausea/vomiting).             promethazine (PHENERGAN) 25 MG suppository  Place 1 suppository (25 mg total) rectally every 6 (six) hours as needed.             sodium chloride 0.9% 0.9 % injection  Inject 10 mLs into the vein as needed.             valacyclovir (VALTREX) 500 MG tablet  TAKE 1 TABLET (500 MG TOTAL) BY MOUTH ONCE DAILY.                 Patient Instructions:    Discharge Procedure Orders  Diet general     Activity as tolerated       Signing Physician:  Brandon VICTORIA  MD [place]    STAFF NOTE:  Agree with above

## 2017-05-19 ENCOUNTER — DOCUMENTATION ONLY (OUTPATIENT)
Dept: REHABILITATION | Facility: HOSPITAL | Age: 52
End: 2017-05-19

## 2017-05-19 DIAGNOSIS — G89.29 CHRONIC BILATERAL THORACIC BACK PAIN: ICD-10-CM

## 2017-05-19 DIAGNOSIS — M62.81 MUSCLE WEAKNESS: ICD-10-CM

## 2017-05-19 DIAGNOSIS — M54.50 CHRONIC BILATERAL LOW BACK PAIN WITHOUT SCIATICA: Primary | ICD-10-CM

## 2017-05-19 DIAGNOSIS — M54.6 CHRONIC BILATERAL THORACIC BACK PAIN: ICD-10-CM

## 2017-05-19 DIAGNOSIS — G89.29 CHRONIC BILATERAL LOW BACK PAIN WITHOUT SCIATICA: Primary | ICD-10-CM

## 2017-05-19 NOTE — PROGRESS NOTES
PHYSICAL THERAPY DISCHARGE SUMMARY    Name: Nikia Lamas  Referring Provider: Opal Ibarra NP  PT Order: PT evaluate and treat   Clinical #: [id number]  Discharge Summary Date: 5/19/2017  Diagnosis:   1. Chronic bilateral low back pain without sciatica     2. Chronic bilateral thoracic back pain     3. Muscle weakness         Patient was seen for 1 OP PT visits on 3-28-17 Pt did not return to PT due to developing medical complications. Treatment included: evaluation, HEP, pt education, ther ex. PT unable to fully assess goal achievement as pt developed medical complications and unable to return to PT. This patient is discharged from OP PT Services.

## 2023-11-01 NOTE — TELEPHONE ENCOUNTER
----- Message from Myranda Parry sent at 2/9/2017  8:57 AM CST -----  Contact: Pt  Pt states she is having some pain and high blood pressure, since Monday    Pt contact number [phone]  Thanks    No

## (undated) DEVICE — SUT 1 48IN PDS II VIO MONO

## (undated) DEVICE — DRESSING ADH ISLAND 3.6 X 14

## (undated) DEVICE — SEE MEDLINE ITEM 156902

## (undated) DEVICE — BLADE 4 INCH EDGE UN-INS

## (undated) DEVICE — SEE MEDLINE ITEM 146417

## (undated) DEVICE — DRAPE ABDOMINAL TIBURON 14X11

## (undated) DEVICE — CONTAINER SPECIMEN STRL 4OZ

## (undated) DEVICE — TOWEL OR XRAY WHITE 17X26IN

## (undated) DEVICE — ELECTRODE REM PLYHSV RETURN 9

## (undated) DEVICE — DRAPE INCISE IOBAN 2 23X17IN